# Patient Record
Sex: FEMALE | Race: WHITE | Employment: OTHER | URBAN - METROPOLITAN AREA
[De-identification: names, ages, dates, MRNs, and addresses within clinical notes are randomized per-mention and may not be internally consistent; named-entity substitution may affect disease eponyms.]

---

## 2018-02-06 NOTE — PROGRESS NOTES
Chart review completed via Care Everywhere for Care Coordination and Gap closures for year 2017. No additional records found.

## 2018-04-08 ENCOUNTER — HOSPITAL ENCOUNTER (EMERGENCY)
Age: 73
Discharge: HOME | End: 2018-04-08
Attending: EMERGENCY MEDICINE
Payer: MEDICARE

## 2018-04-08 ENCOUNTER — APPOINTMENT (OUTPATIENT)
Dept: GENERAL RADIOLOGY | Age: 73
End: 2018-04-08
Payer: MEDICARE

## 2018-04-08 VITALS
BODY MASS INDEX: 27.64 KG/M2 | WEIGHT: 156 LBS | TEMPERATURE: 97.9 F | DIASTOLIC BLOOD PRESSURE: 68 MMHG | SYSTOLIC BLOOD PRESSURE: 134 MMHG | HEART RATE: 69 BPM | HEIGHT: 63 IN | OXYGEN SATURATION: 97 % | RESPIRATION RATE: 16 BRPM

## 2018-04-08 DIAGNOSIS — M51.37 DDD (DEGENERATIVE DISC DISEASE), LUMBOSACRAL: Primary | ICD-10-CM

## 2018-04-08 PROCEDURE — 72100 X-RAY EXAM L-S SPINE 2/3 VWS: CPT

## 2018-04-08 PROCEDURE — 73502 X-RAY EXAM HIP UNI 2-3 VIEWS: CPT

## 2018-04-08 PROCEDURE — 81003 URINALYSIS AUTO W/O SCOPE: CPT | Performed by: PHYSICIAN ASSISTANT

## 2018-04-08 PROCEDURE — 99284 EMERGENCY DEPT VISIT MOD MDM: CPT | Performed by: PHYSICIAN ASSISTANT

## 2018-04-08 RX ORDER — TRAMADOL HYDROCHLORIDE 50 MG/1
50 TABLET ORAL
Qty: 20 TAB | Refills: 0 | Status: SHIPPED | OUTPATIENT
Start: 2018-04-08 | End: 2018-07-10

## 2018-04-08 RX ORDER — METHYLPREDNISOLONE 4 MG/1
4 TABLET ORAL
Qty: 1 DOSE PACK | Refills: 0 | Status: SHIPPED | OUTPATIENT
Start: 2018-04-08 | End: 2018-07-10

## 2018-04-08 NOTE — ED NOTES
I have reviewed discharge instructions with the patient. The patient verbalized understanding. Patient left ED via Discharge Method: ambulatory to Home with daughter. Opportunity for questions and clarification provided. Patient given 1 scripts. Tramadol. Rx for medrol edgar e-scribed to patient's pharmacy. To continue your aftercare when you leave the hospital, you may receive an automated call from our care team to check in on how you are doing. This is a free service and part of our promise to provide the best care and service to meet your aftercare needs.  If you have questions, or wish to unsubscribe from this service please call [phone]. Thank you for Choosing our [facility] Emergency Department.

## 2018-04-08 NOTE — ED PROVIDER NOTES
HPI Comments: Patient is here with low back and right hip pain that started over 2 weeks ago. She states that she does have a history of degenerative disc disease and was getting shots in her back about 5 years ago but it was doing better until about 2 weeks ago. She did not injure it but woke up with the pain. She has not had any dysuria, polyuria, otherwise trouble with urination or bowel movements and does not have any leg pain. The pain does go into the right hip but no weakness or swelling of the legs. No chest pain, shortness of breath, abdominal pain, dyspnea on exertion, orthopnea or other symptoms. She was ambulatory to the bathroom and the stretcher without difficulty and well-hydrated. Patient is a 67 y.o. female presenting with back pain and hip pain. The history is provided by the patient. Back Pain    This is a recurrent problem. The current episode started more than 1 week ago (2 weeks now). The problem has been gradually worsening. The problem occurs constantly. The pain is associated with no known injury. The pain is present in the lumbar spine (right hip pain). The quality of the pain is described as aching. The pain is at a severity of 9/10. The pain is moderate. The symptoms are aggravated by bending and twisting. Pertinent negatives include no chest pain, no fever, no numbness, no weight loss, no headaches, no abdominal pain, no abdominal swelling, no bowel incontinence, no perianal numbness, no bladder incontinence, no dysuria, no pelvic pain, no leg pain, no paresthesias, no paresis, no tingling and no weakness. She has tried nothing for the symptoms. Hip Injury    This is a recurrent problem. The current episode started more than 1 week ago (2 weeks now). The problem occurs constantly. The problem has been gradually worsening. The pain is present in the right hip. The quality of the pain is described as aching. The pain is at a severity of 9/10. The pain is moderate.  Associated symptoms include back pain. Pertinent negatives include no numbness and no tingling. The symptoms are aggravated by activity and standing. There has been no history of extremity trauma. Past Medical History:   Diagnosis Date    Anxiety and depression     Arthritis     Chest discomfort     Apr 2014 - Lexiscan MPI - normal EKG, perfusion images and LVEF    Chronic back pain     COPD (chronic obstructive pulmonary disease) (HCC)     DM2 (diabetes mellitus, type 2) ([address] 6/21/2012    Frequent UTI     GERD (gastroesophageal reflux disease)     H/O seasonal allergies     Heart murmur     Hiatal hernia     History of kidney stones     HLD (hyperlipidemia) 6/21/2012    HTN (hypertension) 6/21/2012    Hypothyroidism     Varicose veins 6/21/2012       Past Surgical History:   Procedure Laterality Date    ENDOSCOPY, COLON, DIAGNOSTIC  2011    two polyps    HX APPENDECTOMY  1976    HX BREAST BIOPSY      HX CATARACT REMOVAL  2002 and 2006    bilateral    HX CHOLECYSTECTOMY  1997    HX CYST REMOVAL  1973    wrist    HX GYN  1997    vaginal repair    HX HEART CATHETERIZATION  2007    left side    HX HERNIA REPAIR  1997    HX HYSTERECTOMY  1982    HX MOHS PROCEDURES      HX ORTHOPAEDIC  1985    ankle    HX TUBAL LIGATION  1976    bilateral    HX UROLOGICAL  1997    bladder tack         Family History:   Problem Relation Age of Onset    Diabetes Mother     Colon Polyps Mother     Coronary Artery Disease Father     Diabetes Sister     Coronary Artery Disease Sister     Coronary Artery Disease Brother     Diabetes Brother     Breast Cancer Paternal Aunt 67       Social History     Social History    Marital status:      Spouse name: N/A    Number of children: N/A    Years of education: N/A     Occupational History    Not on file.      Social History Main Topics    Smoking status: Passive Smoke Exposure - Never Smoker    Smokeless tobacco: Never Used    Alcohol use No    Drug use: No    Sexual activity: Not on file     Other Topics Concern    Not on file     Social History Narrative         ALLERGIES: Aspirin; Codeine; Keflex [cephalexin]; and Sulfa dyne    Review of Systems   Constitutional: Negative. Negative for fever and weight loss. HENT: Negative. Eyes: Negative. Respiratory: Negative. Cardiovascular: Negative. Negative for chest pain. Gastrointestinal: Negative. Negative for abdominal pain and bowel incontinence. Genitourinary: Negative. Negative for bladder incontinence, dysuria and pelvic pain. Musculoskeletal: Positive for back pain. Right hip pain     Skin: Negative. Neurological: Negative. Negative for tingling, weakness, numbness, headaches and paresthesias. Psychiatric/Behavioral: Negative. All other systems reviewed and are negative. Vitals:    04/08/18 1337   BP: 155/83   Pulse: 80   Resp: 18   Temp: 97.9 °F (36.6 °C)   SpO2: 98%   Weight: 70.8 kg (156 lb)   Height: 5' 3\" (1.6 m)            Physical Exam   Constitutional: She is oriented to person, place, and time. She appears well-developed and well-nourished. HENT:   Head: Normocephalic and atraumatic. Right Ear: External ear normal.   Left Ear: External ear normal.   Nose: Nose normal.   Mouth/Throat: Oropharynx is clear and moist.   Eyes: Conjunctivae and EOM are normal. Pupils are equal, round, and reactive to light. Neck: Normal range of motion. Neck supple. Cardiovascular: Normal rate, regular rhythm, normal heart sounds and intact distal pulses. Pulmonary/Chest: Effort normal and breath sounds normal.   Abdominal: Soft. Bowel sounds are normal.   Musculoskeletal: Normal range of motion. Back:    Neurological: She is alert and oriented to person, place, and time. She has normal strength and normal reflexes. No cranial nerve deficit or sensory deficit. She displays a negative Romberg sign. GCS eye subscore is 4. GCS verbal subscore is 5.  GCS motor subscore is 6. Reflex Scores:       Tricep reflexes are 2+ on the right side and 2+ on the left side. Bicep reflexes are 2+ on the right side and 2+ on the left side. Brachioradialis reflexes are 2+ on the right side and 2+ on the left side. Patellar reflexes are 2+ on the right side and 2+ on the left side. Achilles reflexes are 2+ on the right side and 2+ on the left side. Patient ambulatory to bathroom, gave sample and ambulatory back to stretcher. Skin: Skin is warm and dry. Psychiatric: She has a normal mood and affect. Her behavior is normal. Judgment and thought content normal.   Nursing note and vitals reviewed. MDM  Number of Diagnoses or Management Options     Amount and/or Complexity of Data Reviewed  Clinical lab tests: ordered and reviewed  Tests in the radiology section of CPT®: ordered and reviewed    Risk of Complications, Morbidity, and/or Mortality  Presenting problems: moderate  Diagnostic procedures: moderate  Management options: moderate    Patient Progress  Patient progress: stable        ED Course       Procedures    The patient was observed in the ED. Results Reviewed:  XR HIP RT W OR WO PELV 2-3 VWS   Final Result   Impression:  No evidence of acute injury. XR SPINE LUMB 2 OR 3 V   Final Result   Impression:  No evidence of acute injury. Mild diffuse lumbar degenerative disc   disease as above. Warm, moist heat to area, massage, gentle range of motion and stretching to area multiple times a day. Follow up with PCP for recheck. Use the medication as directed. Patient did not want any medication while here. No neurosurgical emergency on exam today. Patient is stable for discharge. I discussed the results of all labs, procedures, radiographs, and treatments with the patient and available family. Treatment plan is agreed upon and the patient is ready for discharge.   All voiced understanding of the discharge plan and medication instructions or changes as appropriate. Questions about treatment in the ED were answered. All were encouraged to return should symptoms worsen or new problems develop.

## 2018-04-08 NOTE — DISCHARGE INSTRUCTIONS
Learning About Degenerative Disc Disease  What is degenerative disc disease? Degenerative disc disease is not really a disease. It's a term used to describe the normal changes in your spinal discs as you age. Spinal discs are small, spongy discs that separate the bones (vertebrae) that make up the spine. The discs act as shock absorbers for the spine, so it can flex, bend, and twist.  Degenerative disc disease can take place in one or more places along the spine. It most often occurs in the discs in the lower back and the neck. What causes it? As we age, our spinal discs break down, or degenerate. This breakdown causes the symptoms of degenerative disc disease in some people. When the discs break down, they can lose fluid and dry out, and their outer layers can have tiny cracks or tears. This leads to less padding and less space between the bones in the spine. The body reacts to this by making bony growths on the spine called bone spurs. These spurs can press on the spinal nerve roots or spinal cord. This can cause pain and can affect how well the nerves work. What are the symptoms? Many people with degenerative disc disease have no pain. But others have severe pain or other symptoms that limit their activities. Some of the most common symptoms are:  · Pain in the back or neck. Where the pain occurs depends on which discs are affected. · Pain that gets worse when you move, such as bending over, reaching up, or twisting. · Pain that may occur in the rear end (buttocks), arm, or leg if a nerve is pinched. · Numbness or tingling in your arm or leg. How is it diagnosed? A doctor can often diagnose degenerative disc disease while doing a physical exam. If your exam shows no signs of a serious condition, imaging tests (such as an X-ray) aren't likely to help your doctor find the cause of your symptoms.   Sometimes degenerative disc disease is found when an X-ray is taken for another reason, such as an injury or other health problem. But even if the doctor finds degenerative disc disease, that doesn't always mean that you will have symptoms. How is it treated? There are several things you can do at home to manage pain from this problem. · To relieve pain, use ice or heat (whichever feels better) on the affected area. ¨ Put ice or a cold pack on the area for 10 to 20 minutes at a time. Put a thin cloth between the ice and your skin. ¨ Put a warm water bottle, a heating pad set on low, or a warm cloth on your back. Put a thin cloth between the heating pad and your skin. Do not go to sleep with a heating pad on your skin. · Ask your doctor if you can take acetaminophen (such as Tylenol) or nonsteroidal anti-inflammatory drugs, such as ibuprofen or naproxen. Your doctor can prescribe stronger medicines if needed. Be safe with medicines. Read and follow all instructions on the label. · Get some exercise every day. Exercise is one of the best ways to help your back feel better and stay better. It's best to start each exercise slowly. You may notice a little soreness, and that's okay. But if an exercise makes your pain worse, stop doing it. Here are things you can try:  ¨ Walking. It's the simplest and maybe the best activity for your back. It gets your blood moving and helps your muscles stay strong. ¨ Exercises that gently stretch and strengthen your stomach, back, and leg muscles. The stronger those muscles are, the better they're able to protect your back. If you have constant or severe pain in your back or spine, you may need other treatments, such as physical therapy. In some cases, your doctor may suggest surgery. Follow-up care is a key part of your treatment and safety. Be sure to make and go to all appointments, and call your doctor if you are having problems. It's also a good idea to know your test results and keep a list of the medicines you take. Where can you learn more?   Go to http://hattie-dino.info/. Enter O243 in the search box to learn more about \"Learning About Degenerative Disc Disease. \"  Current as of: March 21, 2017  Content Version: 11.4  © 0917-0555 [employer] Care instructions adapted under license by [employer] (which disclaims liability or warranty for this information). If you have questions about a medical condition or this instruction, always ask your healthcare professional. [place] [address] any warranty or liability for your use of this information. Low Back Arthritis: Exercises  Your Care Instructions  Here are some examples of typical rehabilitation exercises for your condition. Start each exercise slowly. Ease off the exercise if you start to have pain. Your doctor or physical therapist will tell you when you can start these exercises and which ones will work best for you. When you are not being active, find a comfortable position for rest. Some people are comfortable on the floor or a medium-firm bed with a small pillow under their head and another under their knees. Some people prefer to lie on their side with a pillow between their knees. Don't stay in one position for too long. Take short walks (10 to 20 minutes) every 2 to 3 hours. Avoid slopes, hills, and stairs until you feel better. Walk only distances you can manage without pain, especially leg pain. How to do the exercises  Pelvic tilt    1. Lie on your back with your knees bent. 2. \"Brace\" your stomach-tighten your muscles by pulling in and imagining your belly button moving toward your spine. 3. Press your lower back into the floor. You should feel your hips and pelvis rock back. 4. Hold for 6 seconds while breathing smoothly. 5. Relax and allow your pelvis and hips to rock forward. 6. Repeat 8 to 12 times. Back stretches    1. Get down on your hands and knees on the floor. 2. Relax your head and allow it to droop.  Round your back up toward the ceiling until you feel a nice stretch in your upper, middle, and lower back. Hold this stretch for as long as it feels comfortable, or about 15 to 30 seconds. 3. Return to the starting position with a flat back while you are on your hands and knees. 4. Let your back sway by pressing your stomach toward the floor. Lift your buttocks toward the ceiling. 5. Hold this position for 15 to 30 seconds. 6. Repeat 2 to 4 times. Follow-up care is a key part of your treatment and safety. Be sure to make and go to all appointments, and call your doctor if you are having problems. It's also a good idea to know your test results and keep a list of the medicines you take. Where can you learn more? Go to http://hattie-dino.info/. Enter R835 in the search box to learn more about \"Low Back Arthritis: Exercises. \"  Current as of: March 21, 2017  Content Version: 11.4  © 6515-1524 [employer]. Care instructions adapted under license by [employer] (which disclaims liability or warranty for this information). If you have questions about a medical condition or this instruction, always ask your healthcare professional. [address] any warranty or liability for your use of this information.

## 2018-04-26 ENCOUNTER — HOSPITAL ENCOUNTER (OUTPATIENT)
Dept: CT IMAGING | Age: 73
Discharge: HOME | End: 2018-04-26
Attending: INTERNAL MEDICINE
Payer: MEDICARE

## 2018-04-26 DIAGNOSIS — R51.9 NONINTRACTABLE HEADACHE, UNSPECIFIED CHRONICITY PATTERN, UNSPECIFIED HEADACHE TYPE: ICD-10-CM

## 2018-04-26 DIAGNOSIS — R22.0 SCALP LUMP: ICD-10-CM

## 2018-04-26 PROCEDURE — 70450 CT HEAD/BRAIN W/O DYE: CPT

## 2018-12-25 ENCOUNTER — APPOINTMENT (OUTPATIENT)
Dept: GENERAL RADIOLOGY | Age: 73
End: 2018-12-25
Attending: STUDENT IN AN ORGANIZED HEALTH CARE EDUCATION/TRAINING PROGRAM
Payer: MEDICARE

## 2018-12-25 ENCOUNTER — HOSPITAL ENCOUNTER (EMERGENCY)
Age: 73
Discharge: HOME | End: 2018-12-25
Attending: STUDENT IN AN ORGANIZED HEALTH CARE EDUCATION/TRAINING PROGRAM
Payer: MEDICARE

## 2018-12-25 VITALS
TEMPERATURE: 98.9 F | HEART RATE: 92 BPM | WEIGHT: 156 LBS | BODY MASS INDEX: 28.71 KG/M2 | OXYGEN SATURATION: 92 % | RESPIRATION RATE: 18 BRPM | HEIGHT: 62 IN | DIASTOLIC BLOOD PRESSURE: 68 MMHG | SYSTOLIC BLOOD PRESSURE: 148 MMHG

## 2018-12-25 DIAGNOSIS — S40.011A CONTUSION OF RIGHT SHOULDER, INITIAL ENCOUNTER: Primary | ICD-10-CM

## 2018-12-25 PROCEDURE — 74011250636 HC RX REV CODE- 250/636: Performed by: STUDENT IN AN ORGANIZED HEALTH CARE EDUCATION/TRAINING PROGRAM

## 2018-12-25 PROCEDURE — 73030 X-RAY EXAM OF SHOULDER: CPT

## 2018-12-25 PROCEDURE — 96372 THER/PROPH/DIAG INJ SC/IM: CPT | Performed by: STUDENT IN AN ORGANIZED HEALTH CARE EDUCATION/TRAINING PROGRAM

## 2018-12-25 PROCEDURE — 99283 EMERGENCY DEPT VISIT LOW MDM: CPT | Performed by: STUDENT IN AN ORGANIZED HEALTH CARE EDUCATION/TRAINING PROGRAM

## 2018-12-25 RX ORDER — DICLOFENAC SODIUM 10 MG/G
2 GEL TOPICAL 4 TIMES DAILY
Qty: 100 G | Refills: 0 | Status: SHIPPED | OUTPATIENT
Start: 2018-12-25 | End: 2019-01-01

## 2018-12-25 RX ORDER — CYCLOBENZAPRINE HCL 5 MG
10 TABLET ORAL
Qty: 20 TAB | Refills: 0 | Status: SHIPPED | OUTPATIENT
Start: 2018-12-25 | End: 2019-10-22 | Stop reason: SDUPTHER

## 2018-12-25 RX ORDER — NAPROXEN 500 MG/1
500 TABLET ORAL 2 TIMES DAILY WITH MEALS
Qty: 20 TAB | Refills: 0 | Status: SHIPPED | OUTPATIENT
Start: 2018-12-25 | End: 2019-01-04

## 2018-12-25 RX ORDER — KETOROLAC TROMETHAMINE 30 MG/ML
30 INJECTION, SOLUTION INTRAMUSCULAR; INTRAVENOUS
Status: COMPLETED | OUTPATIENT
Start: 2018-12-25 | End: 2018-12-25

## 2018-12-25 RX ADMIN — KETOROLAC TROMETHAMINE 30 MG: 30 INJECTION, SOLUTION INTRAMUSCULAR at 04:10

## 2018-12-25 NOTE — ED PROVIDER NOTES
59-year-old female patient presents with reports of right-sided shoulder pain after backing into a cabinet earlier this evening. Patient states she backed into the sharp corner of the cabinet impacting her shoulder/scapula on the right side. She reports continued pain following the incident. This pain is diffuse through the shoulder, over the shoulder blade and radiates down the right arm. She reports no improvement with tramadol at home and topical NSAID ointment she applied. There is no associated chest pain or shortness of breath. This pain is worsened with movement and palpation of the area. She denies any other injury. The history is provided by the patient and a relative. Shoulder Pain    The incident occurred 6 to 12 hours ago. The incident occurred at home. The injury mechanism was a direct blow. The right shoulder is affected. Pertinent negatives include no numbness.         Past Medical History:   Diagnosis Date    Anxiety and depression     Arthritis     Chest discomfort     Apr 2014 - Viola Cassidy MPI - normal EKG, perfusion images and LVEF    Chronic back pain     COPD [facility] disease) ([facility]     [facility] 6/21/2012    Frequent UTI     GERD (gastroesophageal reflux disease)     H/O seasonal allergies     Heart murmur     Hiatal hernia     History of kidney stones     HLD (hyperlipidemia) 6/21/2012    HTN (hypertension) 6/21/2012    Hypothyroidism     Varicose veins 6/21/2012       Past Surgical History:   Procedure Laterality Date    ENDOSCOPY, COLON, DIAGNOSTIC  2011    two polyps    HX APPENDECTOMY  1976    HX BREAST BIOPSY      HX CATARACT REMOVAL  2002 and 2006    bilateral    HX CHOLECYSTECTOMY  1997    HX CYST REMOVAL  1973    wrist    HX GYN  1997    vaginal repair    HX HEART CATHETERIZATION  2007    left side    HX HERNIA REPAIR  1997    HX HYSTERECTOMY  1982    HX MOHS PROCEDURES      [facility] ankle    HX TUBAL LIGATION  1976    bilateral    HX UROLOGICAL  1997    bladder tack         Family History:   Problem Relation Age of Onset    Diabetes Mother     Colon Polyps Mother     Coronary Artery Disease Father     Diabetes Sister     Coronary Artery Disease Sister     Coronary Artery Disease Brother     Diabetes Brother     Breast Cancer Paternal Aunt 67       Social History     Socioeconomic History    Marital status:      Spouse name: Not on file    Number of children: Not on file    Years of education: Not on file    Highest education level: Not on file   Social Needs    Financial resource strain: Not on file    Food insecurity - worry: Not on file    Food insecurity - inability: Not on file   [employer] needs - medical: Not on file   [employer] needs - non-medical: Not on file   Occupational History    Not on file   Tobacco Use    Smoking status: Passive Smoke Exposure - Never Smoker    Smokeless tobacco: Never Used   Substance and Sexual Activity    Alcohol use: No     Alcohol/week: 0.0 oz    Drug use: No    Sexual activity: Not on file   Other Topics Concern    Not on file   Social History Narrative    Not on file         ALLERGIES: Aspirin; Codeine; Keflex [cephalexin]; and Sulfa dyne    Review of Systems   Constitutional: Negative for chills, diaphoresis and fever. HENT: Negative for congestion, sneezing and sore throat. Eyes: Negative for visual disturbance. Respiratory: Negative for cough, chest tightness, shortness of breath and wheezing. Cardiovascular: Negative for chest pain and leg swelling. Gastrointestinal: Negative for abdominal pain, blood in stool, diarrhea, nausea and vomiting. Endocrine: Negative for polyuria. Genitourinary: Negative for difficulty urinating, dysuria, flank pain, hematuria and urgency. Musculoskeletal: Negative for back pain, myalgias, neck pain and neck stiffness.    Skin: Negative for color change and rash.   Neurological: Negative for dizziness, syncope, speech difficulty, weakness, light-headedness, numbness and headaches. Psychiatric/Behavioral: Negative for behavioral problems. All other systems reviewed and are negative. Vitals:    12/25/18 0336   BP: 184/87   Pulse: 92   Resp: 18   Temp: 98.6 °F (37 °C)   SpO2: 94%   Weight: 70.8 kg (156 lb)   Height: 5' 2\" (1.575 m)            Physical Exam   Constitutional: She is oriented to person, place, and time. She appears well-developed and well-nourished. No distress. Alert and oriented to person place and time. No acute distress, speaks in clear, fluid sentences. HENT:   Head: Normocephalic and atraumatic. Right Ear: External ear normal.   Left Ear: External ear normal.   Nose: Nose normal.   Eyes: EOM are normal. Pupils are equal, round, and reactive to light. Neck: Normal range of motion. Cardiovascular: Normal rate, regular rhythm, normal heart sounds and intact distal pulses. Exam reveals no gallop and no friction rub. No murmur heard. Pulmonary/Chest: Effort normal and breath sounds normal. No respiratory distress. She has no wheezes. She has no rales. She exhibits no tenderness. Abdominal: Normal appearance. Musculoskeletal: Normal range of motion. She exhibits no edema, tenderness or deformity. Arms:  There is reproducible pain with palpation of the musculature over the right scapula and posterior right shoulder. There is no obvious deformity, dislocation or significant signs of trauma. There is a small amount of muscular fullness to this area as well. Pain is reproducible on exam  Pulses are intact in the distal extremity with equal  strength bilaterally. Sensation intact as well. There is increased pain with abduction and forward flexion. There is no palpable crepitus   Neurological: She is alert and oriented to person, place, and time. No cranial nerve deficit. Skin: Skin is warm and dry.  She is not diaphoretic. Nursing note and vitals reviewed. MDM  Number of Diagnoses or Management Options  Diagnosis management comments: Symptoms consistent with musculoskeletal injury. We will obtain x-ray imaging to rule out bony abnormality.        Amount and/or Complexity of Data Reviewed  Tests in the radiology section of CPT®: ordered and reviewed    Risk of Complications, Morbidity, and/or Mortality  Presenting problems: moderate  Diagnostic procedures: low  Management options: moderate    Patient Progress  Patient progress: stable         Procedures

## 2018-12-25 NOTE — ED TRIAGE NOTES
Pt states she hit the back of her shoulder against the cabinet last evening and the pain continues to increase. Hx of rotator cuff surgery.

## 2018-12-25 NOTE — ED NOTES
I have reviewed discharge instructions with the patient. The patient verbalized understanding. Patient left ED via Discharge Method: ambulatory to Home with granddaughter. Opportunity for questions and clarification provided. Patient given 3 scripts. To continue your aftercare when you leave the hospital, you may receive an automated call from our care team to check in on how you are doing. This is a free service and part of our promise to provide the best care and service to meet your aftercare needs.  If you have questions, or wish to unsubscribe from this service please call [phone]. Thank you for Choosing our [facility] Emergency Department.

## 2019-03-27 ENCOUNTER — PATIENT OUTREACH (OUTPATIENT)
Dept: CASE MANAGEMENT | Age: 74
End: 2019-03-27

## 2019-03-29 ENCOUNTER — PATIENT OUTREACH (OUTPATIENT)
Dept: CASE MANAGEMENT | Age: 74
End: 2019-03-29

## 2019-05-01 ENCOUNTER — HOSPITAL ENCOUNTER (EMERGENCY)
Age: 74
Discharge: HOME | End: 2019-05-02
Attending: EMERGENCY MEDICINE | Admitting: EMERGENCY MEDICINE
Payer: MEDICARE

## 2019-05-01 DIAGNOSIS — M54.41 ACUTE RIGHT-SIDED LOW BACK PAIN WITH RIGHT-SIDED SCIATICA: Primary | ICD-10-CM

## 2019-05-01 PROCEDURE — 99283 EMERGENCY DEPT VISIT LOW MDM: CPT | Performed by: EMERGENCY MEDICINE

## 2019-05-02 VITALS
SYSTOLIC BLOOD PRESSURE: 184 MMHG | RESPIRATION RATE: 18 BRPM | HEART RATE: 72 BPM | BODY MASS INDEX: 29.44 KG/M2 | OXYGEN SATURATION: 96 % | WEIGHT: 160 LBS | TEMPERATURE: 98.6 F | HEIGHT: 62 IN | DIASTOLIC BLOOD PRESSURE: 79 MMHG

## 2019-05-02 PROCEDURE — 74011250637 HC RX REV CODE- 250/637: Performed by: EMERGENCY MEDICINE

## 2019-05-02 RX ORDER — TRAMADOL HYDROCHLORIDE 50 MG/1
100 TABLET ORAL
Status: COMPLETED | OUTPATIENT
Start: 2019-05-02 | End: 2019-05-02

## 2019-05-02 RX ORDER — TRAMADOL HYDROCHLORIDE 50 MG/1
50 TABLET ORAL
Qty: 8 TAB | Refills: 0 | Status: SHIPPED | OUTPATIENT
Start: 2019-05-02 | End: 2019-05-04

## 2019-05-02 RX ORDER — METHYLPREDNISOLONE 4 MG/1
TABLET ORAL
Qty: 1 DOSE PACK | Refills: 0 | Status: SHIPPED | OUTPATIENT
Start: 2019-05-02 | End: 2019-05-20

## 2019-05-02 RX ORDER — METAXALONE 800 MG/1
800 TABLET ORAL 3 TIMES DAILY
Qty: 15 TAB | Refills: 0 | Status: SHIPPED | OUTPATIENT
Start: 2019-05-02 | End: 2019-05-07

## 2019-05-02 RX ADMIN — TRAMADOL HYDROCHLORIDE 100 MG: 50 TABLET, FILM COATED ORAL at 00:08

## 2019-05-02 NOTE — ED NOTES
I have reviewed discharge instructions with the patient. The patient verbalized understanding. Patient left ED via Discharge Method: ambulatory to Home with family. Opportunity for questions and clarification provided. Patient given 3 scripts. To continue your aftercare when you leave the hospital, you may receive an automated call from our care team to check in on how you are doing. This is a free service and part of our promise to provide the best care and service to meet your aftercare needs.  If you have questions, or wish to unsubscribe from this service please call [phone]. Thank you for Choosing our [facility] Emergency Department.

## 2019-05-02 NOTE — ED PROVIDER NOTES
62-year-old female has had problems with her back and sciatica on and off over a couple years. Says she seen a surgeon before recommended some back surgery. Skull she's had degenerative disc and arthritis. No injuries recently. No fever or rash or chest difficulty urinating. She has a history of hypertension and diabetes. Her back is been more painful the last few days as worse with movement. It seemed to aggravate tonight she had trouble getting up. No numbness or weakness. Pain low back and down the posterior right thigh to above the knee. This is similar to past episodes. The history is provided by the patient. Back Pain This is a new problem. The current episode started more than 2 days ago. The problem has been gradually worsening. The problem occurs daily. Patient reports not work related injury. The pain is associated with no known injury. The pain is present in the lower back and right side. The quality of the pain is described as aching and burning. The pain radiates to the right thigh. The pain is moderate. The symptoms are aggravated by bending and twisting. Pertinent negatives include no chest pain, no fever, no numbness, no abdominal pain, no bladder incontinence, no dysuria, no paresthesias and no weakness. The treatment provided mild relief. Past Medical History:  
Diagnosis Date  Anxiety and depression  Arthritis  Chest discomfort Apr 2014 - Lexiscan MPI - normal EKG, perfusion images and LVEF  Chronic back pain  COPD (chronic obstructive pulmonary disease) ([facility])  DM2 (diabetes mellitus, type 2) [facility] 6/21/2012  Frequent UTI  GERD (gastroesophageal reflux disease)  H/O seasonal allergies  Heart murmur  Hiatal hernia  History of kidney stones  HLD (hyperlipidemia) 6/21/2012  
 HTN (hypertension) 6/21/2012  Hypothyroidism  Varicose veins 6/21/2012 Past Surgical History:  
Procedure Laterality Date  ENDOSCOPY, COLON, DIAGNOSTIC  2011  
 two polyps [address]  HX BREAST BIOPSY  HX CATARACT REMOVAL  2002 and 2006  
 bilateral  
[address]  HX CYST REMOVAL  1973  
 wrist  
 HX GYN  1997  
 vaginal repair  HX HEART CATHETERIZATION  2007  
 left side [address]  HX MOHS PROCEDURES    
 HX ORTHOPAEDIC  1985  
 ankle  HX TUBAL LIGATION  1976  
 bilateral  
 HX UROLOGICAL  1997  
 bladder tack Family History:  
Problem Relation Age of Onset  Diabetes Mother  Colon Polyps Mother  Coronary Artery Disease Father  Diabetes Sister  Coronary Artery Disease Sister  Coronary Artery Disease Brother  Diabetes Brother  Breast Cancer Paternal Aunt 67 Social History Socioeconomic History  Marital status:  Spouse name: Not on file  Number of children: Not on file  Years of education: Not on file  Highest education level: Not on file Occupational History  Not on file Social Needs  Financial resource strain: Not on file  Food insecurity:  
  Worry: Not on file Inability: Not on file  Transportation needs:  
  Medical: Not on file Non-medical: Not on file Tobacco Use  Smoking status: Passive Smoke Exposure - Never Smoker  Smokeless tobacco: Never Used Substance and Sexual Activity  Alcohol use: No  
  Alcohol/week: 0.0 oz  Drug use: No  
 Sexual activity: Not on file Lifestyle  Physical activity:  
  Days per week: Not on file Minutes per session: Not on file  Stress: Not on file Relationships  Social connections:  
  Talks on phone: Not on file Gets together: Not on file Attends Judaism service: Not on file Active member of club or organization: Not on file Attends meetings of clubs or organizations: Not on file Relationship status: Not on file  Intimate partner violence: Fear of current or ex partner: Not on file Emotionally abused: Not on file Physically abused: Not on file Forced sexual activity: Not on file Other Topics Concern  Not on file Social History Narrative  Not on file ALLERGIES: Aspirin; Codeine; Keflex [cephalexin]; and Sulfa dyne Review of Systems Constitutional: Negative for chills and fever. Cardiovascular: Negative for chest pain. Gastrointestinal: Negative for abdominal pain. Genitourinary: Negative for bladder incontinence, difficulty urinating and dysuria. Musculoskeletal: Positive for back pain. Neurological: Negative for weakness, numbness and paresthesias. Vitals:  
 05/01/19 2247 BP: 176/88 Resp: 16 Temp: 98.6 °F (37 °C) SpO2: 94% Weight: 72.6 kg (160 lb) Height: 5' 2\" (1.575 m) Physical Exam  
Constitutional: She appears well-developed and well-nourished. No distress. Musculoskeletal:  
     Right hip: Normal.  
     Lumbar back: She exhibits decreased range of motion and tenderness. She exhibits no bony tenderness. Neurological:  
Reflex Scores: 
     Patellar reflexes are 1+ on the right side and 1+ on the left side. Patient laboratory good strength in both legs. Some mild right posterior thigh pain with straight leg raising on that side. Skin: Skin is warm and dry. Nursing note and vitals reviewed. MDM Number of Diagnoses or Management Options Diagnosis management comments: No red flags to suggest imaging needed at this point. Amount and/or Complexity of Data Reviewed Review and summarize past medical records: yes (Review primary care doctor or note regarding the patient's back pain and her last visit within the last few weeks. Also emergent department visit for similar pain that was treated with a short course of tramadol and steroids.) Risk of Complications, Morbidity, and/or Mortality Presenting problems: moderate Diagnostic procedures: minimal 
 Management options: low Patient Progress Patient progress: stable Procedures

## 2019-05-02 NOTE — DISCHARGE INSTRUCTIONS
Rest.  Heating pad or Warm bottle. 2 over-the-counter ibuprofen every 6 hours for 2-3 days. Call your doctor to recheck if not feeling better 3-4 days. Recheck sooner if fever or rash or inability to urinate. Patient Education        Back Pain: Care Instructions  Your Care Instructions    Back pain has many possible causes. It is often related to problems with muscles and ligaments of the back. It may also be related to problems with the nerves, discs, or bones of the back. Moving, lifting, standing, sitting, or sleeping in an awkward way can strain the back. Sometimes you don't notice the injury until later. Arthritis is another common cause of back pain. Although it may hurt a lot, back pain usually improves on its own within several weeks. Most people recover in 12 weeks or less. Using good home treatment and being careful not to stress your back can help you feel better sooner. Follow-up care is a key part of your treatment and safety. Be sure to make and go to all appointments, and call your doctor if you are having problems. It's also a good idea to know your test results and keep a list of the medicines you take. How can you care for yourself at home? · Sit or lie in positions that are most comfortable and reduce your pain. Try one of these positions when you lie down:  ? Lie on your back with your knees bent and supported by large pillows. ? Lie on the floor with your legs on the seat of a sofa or chair. ? Lie on your side with your knees and hips bent and a pillow between your legs. ? Lie on your stomach if it does not make pain worse. · Do not sit up in bed, and avoid soft couches and twisted positions. Bed rest can help relieve pain at first, but it delays healing. Avoid bed rest after the first day of back pain. · Change positions every 30 minutes. If you must sit for long periods of time, take breaks from sitting. Get up and walk around, or lie in a comfortable position.   · Try using a heating pad on a low or medium setting for 15 to 20 minutes every 2 or 3 hours. Try a warm shower in place of one session with the heating pad. · You can also try an ice pack for 10 to 15 minutes every 2 to 3 hours. Put a thin cloth between the ice pack and your skin. · Take pain medicines exactly as directed. ? If the doctor gave you a prescription medicine for pain, take it as prescribed. ? If you are not taking a prescription pain medicine, ask your doctor if you can take an over-the-counter medicine. · Take short walks several times a day. You can start with 5 to 10 minutes, 3 or 4 times a day, and work up to longer walks. Walk on level surfaces and avoid hills and stairs until your back is better. · Return to work and other activities as soon as you can. Continued rest without activity is usually not good for your back. · To prevent future back pain, do exercises to stretch and strengthen your back and stomach. Learn how to use good posture, safe lifting techniques, and proper body mechanics. When should you call for help? Call your doctor now or seek immediate medical care if:    · You have new or worsening numbness in your legs.     · You have new or worsening weakness in your legs. (This could make it hard to stand up.)     · You lose control of your bladder or bowels.    Watch closely for changes in your health, and be sure to contact your doctor if:    · You have a fever, lose weight, or don't feel well.     · You do not get better as expected. Where can you learn more? Go to http://hattie-dino.info/. Enter E639 in the search box to learn more about \"Back Pain: Care Instructions. \"  Current as of: September 20, 2018  Content Version: 11.9  © 8471-9623 [employer]. Care instructions adapted under license by [employer] (which disclaims liability or warranty for this information).  If you have questions about a medical condition or this instruction, always ask your healthcare professional. [place] [address] any warranty or liability for your use of this information. Patient Education        Learning About Relief for Back Pain  What is back tension and strain? Back strain happens when you overstretch, or pull, a muscle in your back. You may hurt your back in an accident or when you exercise or lift something. Most back pain will get better with rest and time. You can take care of yourself at home to help your back heal.  What can you do first to relieve back pain? When you first feel back pain, try these steps:  · Walk. Take a short walk (10 to 20 minutes) on a level surface (no slopes, hills, or stairs) every 2 to 3 hours. Walk only distances you can manage without pain, especially leg pain. · Relax. Find a comfortable position for rest. Some people are comfortable on the floor or a medium-firm bed with a small pillow under their head and another under their knees. Some people prefer to lie on their side with a pillow between their knees. Don't stay in one position for too long. · Try heat or ice. Try using a heating pad on a low or medium setting, or take a warm shower, for 15 to 20 minutes every 2 to 3 hours. Or you can buy single-use heat wraps that last up to 8 hours. You can also try an ice pack for 10 to 15 minutes every 2 to 3 hours. You can use an ice pack or a bag of frozen vegetables wrapped in a thin towel. There is not strong evidence that either heat or ice will help, but you can try them to see if they help. You may also want to try switching between heat and cold. · Take pain medicine exactly as directed. ¨ If the doctor gave you a prescription medicine for pain, take it as prescribed. ¨ If you are not taking a prescription pain medicine, ask your doctor if you can take an over-the-counter medicine. What else can you do? · Stretch and exercise.  Exercises that increase flexibility may relieve your pain and make it easier for your muscles to keep your spine in a good, neutral position. And don't forget to keep walking. · Do self-massage. You can use self-massage to unwind after work or school or to energize yourself in the morning. You can easily massage your feet, hands, or neck. Self-massage works best if you are in comfortable clothes and are sitting or lying in a comfortable position. Use oil or lotion to massage bare skin. · Reduce stress. Back pain can lead to a vicious [place]: Distress about the pain tenses the muscles in your back, which in turn causes more pain. Learn how to relax your mind and your muscles to lower your stress. Where can you learn more? Go to http://hattie-dino.info/. Enter M787 in the search box to learn more about \"Learning About Relief for Back Pain. \"  Current as of: March 21, 2017  Content Version: 11.5  © 0107-9870 [employer], [employer]. Care instructions adapted under license by [employer] (which disclaims liability or warranty for this information). If you have questions about a medical condition or this instruction, always ask your healthcare professional. [place] [address] any warranty or liability for your use of this information.

## 2019-05-22 ENCOUNTER — HOSPITAL ENCOUNTER (OUTPATIENT)
Dept: MAMMOGRAPHY | Age: 74
Discharge: HOME | End: 2019-05-22
Attending: INTERNAL MEDICINE
Payer: MEDICARE

## 2019-05-22 DIAGNOSIS — Z12.39 SCREENING FOR BREAST CANCER: ICD-10-CM

## 2019-05-22 PROCEDURE — 77067 SCR MAMMO BI INCL CAD: CPT

## 2019-05-23 ENCOUNTER — PATIENT OUTREACH (OUTPATIENT)
Dept: CASE MANAGEMENT | Age: 74
End: 2019-05-23

## 2019-05-28 ENCOUNTER — PATIENT OUTREACH (OUTPATIENT)
Dept: CASE MANAGEMENT | Age: 74
End: 2019-05-28

## 2019-05-30 NOTE — PROGRESS NOTES
Referral Source & Reason 5/30/19, 1347   Primary concerns per patient and/or pts family/caregiver Med compliance   Recent Hospitalization(s)/ED Visits None   Current with Home Health?  - Agency? No   Social Needs:  - Able to afford medications? - Financial assessment?  - Access to care? Transportation? Reports Insulin is expensive, applying for Medicaid    Drives self or daughter transports   Nutritional Assessment  - Appetite? - Obesity?  - Failure to Thrive? - Bowels ? Admits drinking sugar sweetened beverages   Cognitive Assessment  - History of dementia  - Health literacy    Will assess   Mobility/Activity Assessment  - Bed/chair bound? - Make use of assistive devices? - Does patient still drive? No assistive devices    Can drive   Plan/Interventions/Education  - Follow up Appointments  - Referrals 7/22 PCP Dr Daniella Whitman made multiple attempts to contact pt. Able to speak w/ pt, she is agreeable to CCM f/u. All meds reviewed, states compliance. Discussed importance of blood sugar control, no sugar sweetened beverages, no candy, limit carbs, eat more fruits/ vegetables. Will review again next contact. Discussed f/u appmts and pt has not been seen at Cardiology since 2016. Agreeable to this RN making appmt for her. [facility] and appmt made for 8/16 w/ Dr Queenie Allan. Called pt back to update. Plan to f/u next week, agreeable. This note will not be viewable in [address]

## 2019-06-07 ENCOUNTER — PATIENT OUTREACH (OUTPATIENT)
Dept: CASE MANAGEMENT | Age: 74
End: 2019-06-07

## 2019-06-07 NOTE — PROGRESS NOTES
Community Care Management  Follow up Outreach Note   Outreach type: Phone call: Yes     Date/Time of Outreach: 6/7/19, 1111     Reason for follow-up:   Continued outreach   Disease specific complaints/issues:   Back pain - at [facility] doctor\" when RN called     Patient progress towards goals set from last contact:   Stable   Has patient attended any PCP or specialist follow-up appointments since last contact? What was outcome of appointment? When is next follow-up scheduled? 6/19 DM education  7/22 PCP Dr Luz Senior     Review medications. Any medication changes since last outreach? Does patient have any questions or issues related to their medications? Reports compliance   Home health active? If yes  any issue? Progress? No   Referrals needed?  (SW, Diabetes education, HH, etc. ) No   Other issues/Miscellaneous? (Transportation, access to meals, ability to perform ADLs, adequate caregiver support, etc.)     Daughter denies any issues/ questions         Next Outreach Scheduled: Next week     Next Steps/Goals:   F/U   Community Care Manager: Mic Conley RN     This note will not be viewable in [address]

## 2019-06-18 ENCOUNTER — PATIENT OUTREACH (OUTPATIENT)
Dept: CASE MANAGEMENT | Age: 74
End: 2019-06-18

## 2019-06-18 NOTE — PROGRESS NOTES
Community Care Management  Follow up Outreach Note   Outreach type: Phone call: Yes   Date/Time of Outreach: 6/18/19, 3646     Reason for follow-up:   Continued outreach   Disease specific complaints/issues: None       Patient progress towards goals set from last contact:   Stable   Has patient attended any PCP or specialist follow-up appointments since last contact? What was outcome of appointment? When is next follow-up scheduled? 6/7 MRI   6/19 DM education    7/22 PCP Dr Estuardo Nix   Review medications. Any medication changes since last outreach? Does patient have any questions or issues related to their medications? Pt states compliance   Home health active? If yes  any issue? Progress? No   Referrals needed?  (SW, Diabetes education, HH, etc. ) No   Other issues/Miscellaneous? (Transportation, access to meals, ability to perform ADLs, adequate caregiver support, etc.)     Reports blood sugar reads \"hi\", doesn't think monitor working. Encouraged to take to DM education tomorrow to have assessed. Verbalizes understanding. Discussed eliminating all sugar from diet, using sugar substitutes. Next Outreach Scheduled: Next week     Next Steps/Goals:   F/U   Community Care Manager: Jigar Santos RN         This note will not be viewable in [address]

## 2019-06-19 ENCOUNTER — HOSPITAL ENCOUNTER (OUTPATIENT)
Dept: DIABETES SERVICES | Age: 74
Discharge: HOME | End: 2019-06-19
Payer: MEDICARE

## 2019-06-19 PROCEDURE — G0108 DIAB MANAGE TRN  PER INDIV: HCPCS

## 2019-06-19 NOTE — PROGRESS NOTES
Came for diabetes educational assessment today. Provided basic information on carbohydrates, proteins and fats. Educational need/plan: Will attend 2 nutrition/2 diabetes sessions to address the following: diabetes disease process, nutritional management, physical activity, using medications, preventing complications, psychosocial adjustment, goal setting, problem solving, monitoring, behavior change strategies. Glucometer is currently broken, so she has not been testing her blood sugars. Offered cost effective options. She plans on getting another meter.

## 2019-06-26 ENCOUNTER — PATIENT OUTREACH (OUTPATIENT)
Dept: CASE MANAGEMENT | Age: 74
End: 2019-06-26

## 2019-06-27 NOTE — PROGRESS NOTES
RNCM calls to patient, no VM, unable to leave a message. No return calls. Plan to f/u next week. This note will not be viewable in [address]

## 2019-07-01 ENCOUNTER — HOSPITAL ENCOUNTER (OUTPATIENT)
Dept: DIABETES SERVICES | Age: 74
Discharge: HOME | End: 2019-07-01
Payer: MEDICARE

## 2019-07-01 PROCEDURE — G0109 DIAB MANAGE TRN IND/GROUP: HCPCS

## 2019-07-01 NOTE — PROGRESS NOTES
Participant attended Diabetes #1 session today. Topics included: Characteristics/pathophysiology type 1/type 2 diabetes; Goal/acceptable blood glucose ranges/Hgb A1C/interpreting/using results;meters, continuous glucose monitors and insulin pumps. Using medications safely; Sick day management; Prevention/detection/treatment of acute complications. - Verbalized understanding  of material covered.   -Goal for next session Diabetes Two  -Anticipated adherence is  Good   -Problems/barriers may be none anticipated

## 2019-07-02 ENCOUNTER — PATIENT OUTREACH (OUTPATIENT)
Dept: CASE MANAGEMENT | Age: 74
End: 2019-07-02

## 2019-07-03 NOTE — PROGRESS NOTES
Noted pt is attending DM classes. RNCM unable to reach patient or daughter despite multiple attempts. Plan to discharge pt from f/u, no further outreach planned. Happy to assist in future. This note will not be viewable in [address]

## 2019-07-15 ENCOUNTER — HOSPITAL ENCOUNTER (OUTPATIENT)
Dept: DIABETES SERVICES | Age: 74
Discharge: HOME | End: 2019-07-15
Payer: MEDICARE

## 2019-07-15 PROCEDURE — G0109 DIAB MANAGE TRN IND/GROUP: HCPCS

## 2019-07-23 ENCOUNTER — HOSPITAL ENCOUNTER (OUTPATIENT)
Dept: DIABETES SERVICES | Age: 74
Discharge: HOME | End: 2019-07-23
Payer: MEDICARE

## 2019-07-23 PROCEDURE — G0109 DIAB MANAGE TRN IND/GROUP: HCPCS

## 2019-07-30 ENCOUNTER — HOSPITAL ENCOUNTER (OUTPATIENT)
Dept: DIABETES SERVICES | Age: 74
Discharge: HOME | End: 2019-07-30
Payer: MEDICARE

## 2019-07-30 PROCEDURE — G0109 DIAB MANAGE TRN IND/GROUP: HCPCS

## 2019-07-30 NOTE — PROGRESS NOTES
Attended nutrition diabetes #2 group session today. Topics included: plate method for portion control; fiber and sodium guidelines; sugar substitutes; alcohol; eating out; recipe modification; label reading. Participant's nutrition goal: To get off insulin she will eat a 7 gram protein with meals and snacks and re-evaluate in 3 months. Participant's nutrition support plan: read food labels  Barriers identified by participant: lunch on the job is only 30 minutes. Voiced/demonstrated understanding of material covered. Anticipated adherence is good. Plan for follow up is: will be sent a follow up questionnaire at 6 months and one year.

## 2019-08-29 ENCOUNTER — HOSPITAL ENCOUNTER (OUTPATIENT)
Dept: DIABETES SERVICES | Age: 74
Discharge: HOME | End: 2019-08-29
Payer: MEDICARE

## 2019-08-29 PROCEDURE — G0109 DIAB MANAGE TRN IND/GROUP: HCPCS

## 2019-08-29 NOTE — PROGRESS NOTES
Attended diabetes follow up group class.  Open forum for clients to ask questions based on entire diabetes self management education program. Education topics are driven in this class based on clients' individual questions and needs. Topics included: meal planning, weight loss, eating out, fats, cholesterol, exercise, Hgb A1C, stress/depression, annual eye exam and prevention of flu and pneumonia.

## 2019-09-09 PROBLEM — R07.89 CHEST DISCOMFORT: Status: ACTIVE | Noted: 2019-09-09

## 2019-09-11 ENCOUNTER — HOSPITAL ENCOUNTER (OUTPATIENT)
Dept: LAB | Age: 74
Discharge: HOME | End: 2019-09-11
Payer: MEDICARE

## 2019-09-11 DIAGNOSIS — R94.39 ABNORMAL NUCLEAR STRESS TEST: ICD-10-CM

## 2019-09-11 LAB
ANION GAP SERPL CALC-SCNC: 4 MMOL/L (ref 7–16)
BASOPHILS # BLD: 0 K/UL (ref 0–0.2)
BASOPHILS NFR BLD: 1 % (ref 0–2)
BUN SERPL-MCNC: 10 MG/DL (ref 8–23)
CALCIUM SERPL-MCNC: 9.4 MG/DL (ref 8.3–10.4)
CHLORIDE SERPL-SCNC: 104 MMOL/L (ref 98–107)
CO2 SERPL-SCNC: 31 MMOL/L (ref 21–32)
CREAT SERPL-MCNC: 0.7 MG/DL (ref 0.6–1)
DIFFERENTIAL METHOD BLD: ABNORMAL
EOSINOPHIL # BLD: 0 K/UL (ref 0–0.8)
EOSINOPHIL NFR BLD: 1 % (ref 0.5–7.8)
ERYTHROCYTE [DISTWIDTH] IN BLOOD BY AUTOMATED COUNT: 12.3 % (ref 11.9–14.6)
GLUCOSE SERPL-MCNC: 322 MG/DL (ref 65–100)
HCT VFR BLD AUTO: 45.9 % (ref 35.8–46.3)
HGB BLD-MCNC: 15.4 G/DL (ref 11.7–15.4)
IMM GRANULOCYTES # BLD AUTO: 0 K/UL (ref 0–0.5)
IMM GRANULOCYTES NFR BLD AUTO: 0 % (ref 0–5)
LYMPHOCYTES # BLD: 1.7 K/UL (ref 0.5–4.6)
LYMPHOCYTES NFR BLD: 28 % (ref 13–44)
MCH RBC QN AUTO: 33.3 PG (ref 26.1–32.9)
MCHC RBC AUTO-ENTMCNC: 33.6 G/DL (ref 31.4–35)
MCV RBC AUTO: 99.1 FL (ref 79.6–97.8)
MONOCYTES # BLD: 0.5 K/UL (ref 0.1–1.3)
MONOCYTES NFR BLD: 8 % (ref 4–12)
NEUTS SEG # BLD: 3.8 K/UL (ref 1.7–8.2)
NEUTS SEG NFR BLD: 63 % (ref 43–78)
NRBC # BLD: 0 K/UL (ref 0–0.2)
PLATELET # BLD AUTO: 245 K/UL (ref 150–450)
PMV BLD AUTO: 10.3 FL (ref 9.4–12.3)
POTASSIUM SERPL-SCNC: 3.8 MMOL/L (ref 3.5–5.1)
RBC # BLD AUTO: 4.63 M/UL (ref 4.05–5.2)
SODIUM SERPL-SCNC: 139 MMOL/L (ref 136–145)
WBC # BLD AUTO: 6 K/UL (ref 4.3–11.1)

## 2019-09-11 PROCEDURE — 85025 COMPLETE CBC W/AUTO DIFF WBC: CPT

## 2019-09-11 PROCEDURE — 36415 COLL VENOUS BLD VENIPUNCTURE: CPT

## 2019-09-11 PROCEDURE — 80048 BASIC METABOLIC PNL TOTAL CA: CPT

## 2019-09-15 NOTE — PROGRESS NOTES
Patient pre-assessment complete [address] with Dr Jaycee Mortimer, scheduled for 19 at 9:30am arrival time 7:30am. Patient verified using . Patient instructed to bring all home medications in labeled bottles on the day of procedure. NPO status reinforced. Patient informed to take a full dose plavix 75 mg on the day of procedure. Patient instructed to HOLD metformin (starting tonight, take 1/2 dose insulin tonight & HOLD actos in am . Instructed they can take all other medications excluding vitamins & supplements. Patient verbalizes understanding of all instructions & denies any questions at this time.

## 2019-09-16 ENCOUNTER — HOSPITAL ENCOUNTER (INPATIENT)
Dept: CARDIAC CATH/INVASIVE PROCEDURES | Age: 74
LOS: 11 days | Discharge: NURSING, REHAB OR LONG-TERM CARE | DRG: 234 | End: 2019-09-27
Attending: INTERNAL MEDICINE | Admitting: INTERNAL MEDICINE
Payer: MEDICARE

## 2019-09-16 DIAGNOSIS — R09.02 HYPOXIA: ICD-10-CM

## 2019-09-16 DIAGNOSIS — Z95.1 S/P CABG X 3: ICD-10-CM

## 2019-09-16 DIAGNOSIS — J98.11 ATELECTASIS: ICD-10-CM

## 2019-09-16 DIAGNOSIS — Z99.11 ENCOUNTER FOR WEANING FROM VENTILATOR (HCC): ICD-10-CM

## 2019-09-16 PROBLEM — I25.10 CAD (CORONARY ARTERY DISEASE): Status: ACTIVE | Noted: 2019-09-16

## 2019-09-16 LAB
APPEARANCE UR: CLEAR
APTT PPP: 46.6 SEC (ref 24.7–39.8)
ATRIAL RATE: 64 BPM
BASOPHILS # BLD: 0 K/UL (ref 0–0.2)
BASOPHILS NFR BLD: 0 % (ref 0–2)
BILIRUB UR QL: NEGATIVE
CALCULATED P AXIS, ECG09: 65 DEGREES
CALCULATED R AXIS, ECG10: 32 DEGREES
CALCULATED T AXIS, ECG11: 85 DEGREES
COLOR UR: YELLOW
DIAGNOSIS, 93000: NORMAL
DIFFERENTIAL METHOD BLD: ABNORMAL
EOSINOPHIL # BLD: 0.1 K/UL (ref 0–0.8)
EOSINOPHIL NFR BLD: 1 % (ref 0.5–7.8)
ERYTHROCYTE [DISTWIDTH] IN BLOOD BY AUTOMATED COUNT: 12.3 % (ref 11.9–14.6)
GLUCOSE BLD STRIP.AUTO-MCNC: 249 MG/DL (ref 65–100)
GLUCOSE BLD STRIP.AUTO-MCNC: 308 MG/DL (ref 65–100)
GLUCOSE BLD STRIP.AUTO-MCNC: 338 MG/DL (ref 65–100)
GLUCOSE BLD STRIP.AUTO-MCNC: 343 MG/DL (ref 65–100)
GLUCOSE UR STRIP.AUTO-MCNC: >1000 MG/DL
HCT VFR BLD AUTO: 41.4 % (ref 35.8–46.3)
HGB BLD-MCNC: 13.7 G/DL (ref 11.7–15.4)
HGB UR QL STRIP: NEGATIVE
IMM GRANULOCYTES # BLD AUTO: 0 K/UL (ref 0–0.5)
IMM GRANULOCYTES NFR BLD AUTO: 0 % (ref 0–5)
KETONES UR QL STRIP.AUTO: NEGATIVE MG/DL
LEUKOCYTE ESTERASE UR QL STRIP.AUTO: NEGATIVE
LYMPHOCYTES # BLD: 1.6 K/UL (ref 0.5–4.6)
LYMPHOCYTES NFR BLD: 23 % (ref 13–44)
MCH RBC QN AUTO: 33.3 PG (ref 26.1–32.9)
MCHC RBC AUTO-ENTMCNC: 33.1 G/DL (ref 31.4–35)
MCV RBC AUTO: 100.7 FL (ref 79.6–97.8)
MONOCYTES # BLD: 0.6 K/UL (ref 0.1–1.3)
MONOCYTES NFR BLD: 8 % (ref 4–12)
NEUTS SEG # BLD: 5 K/UL (ref 1.7–8.2)
NEUTS SEG NFR BLD: 69 % (ref 43–78)
NITRITE UR QL STRIP.AUTO: NEGATIVE
NRBC # BLD: 0 K/UL (ref 0–0.2)
P-R INTERVAL, ECG05: 144 MS
PH UR STRIP: 7 [PH] (ref 5–9)
PLATELET # BLD AUTO: 197 K/UL (ref 150–450)
PMV BLD AUTO: 11.1 FL (ref 9.4–12.3)
PROT UR STRIP-MCNC: NEGATIVE MG/DL
Q-T INTERVAL, ECG07: 406 MS
QRS DURATION, ECG06: 84 MS
QTC CALCULATION (BEZET), ECG08: 418 MS
RBC # BLD AUTO: 4.11 M/UL (ref 4.05–5.2)
SP GR UR REFRACTOMETRY: 1.02 (ref 1–1.02)
UROBILINOGEN UR QL STRIP.AUTO: 1 EU/DL (ref 0.2–1)
VENTRICULAR RATE, ECG03: 64 BPM
WBC # BLD AUTO: 7.2 K/UL (ref 4.3–11.1)

## 2019-09-16 PROCEDURE — 74011636637 HC RX REV CODE- 636/637: Performed by: INTERNAL MEDICINE

## 2019-09-16 PROCEDURE — 74011636320 HC RX REV CODE- 636/320: Performed by: INTERNAL MEDICINE

## 2019-09-16 PROCEDURE — 4A023N7 MEASUREMENT OF CARDIAC SAMPLING AND PRESSURE, LEFT HEART, PERCUTANEOUS APPROACH: ICD-10-PCS | Performed by: INTERNAL MEDICINE

## 2019-09-16 PROCEDURE — 74011250636 HC RX REV CODE- 250/636

## 2019-09-16 PROCEDURE — B2111ZZ FLUOROSCOPY OF MULTIPLE CORONARY ARTERIES USING LOW OSMOLAR CONTRAST: ICD-10-PCS | Performed by: INTERNAL MEDICINE

## 2019-09-16 PROCEDURE — 36415 COLL VENOUS BLD VENIPUNCTURE: CPT

## 2019-09-16 PROCEDURE — C1894 INTRO/SHEATH, NON-LASER: HCPCS

## 2019-09-16 PROCEDURE — 65660000000 HC RM CCU STEPDOWN

## 2019-09-16 PROCEDURE — 74011250636 HC RX REV CODE- 250/636: Performed by: INTERNAL MEDICINE

## 2019-09-16 PROCEDURE — 94010 BREATHING CAPACITY TEST: CPT

## 2019-09-16 PROCEDURE — 99153 MOD SED SAME PHYS/QHP EA: CPT

## 2019-09-16 PROCEDURE — 81003 URINALYSIS AUTO W/O SCOPE: CPT

## 2019-09-16 PROCEDURE — 99152 MOD SED SAME PHYS/QHP 5/>YRS: CPT

## 2019-09-16 PROCEDURE — 82962 GLUCOSE BLOOD TEST: CPT

## 2019-09-16 PROCEDURE — 85025 COMPLETE CBC W/AUTO DIFF WBC: CPT

## 2019-09-16 PROCEDURE — 85730 THROMBOPLASTIN TIME PARTIAL: CPT

## 2019-09-16 PROCEDURE — 93005 ELECTROCARDIOGRAM TRACING: CPT | Performed by: INTERNAL MEDICINE

## 2019-09-16 PROCEDURE — 77030029997 HC DEV COM RDL R BND TELE -B

## 2019-09-16 PROCEDURE — C1769 GUIDE WIRE: HCPCS

## 2019-09-16 PROCEDURE — 74011250637 HC RX REV CODE- 250/637: Performed by: INTERNAL MEDICINE

## 2019-09-16 PROCEDURE — 93458 L HRT ARTERY/VENTRICLE ANGIO: CPT

## 2019-09-16 PROCEDURE — 77030019569 HC BND COMPR RAD TERU -B

## 2019-09-16 PROCEDURE — 74011636637 HC RX REV CODE- 636/637: Performed by: FAMILY MEDICINE

## 2019-09-16 PROCEDURE — 74011000250 HC RX REV CODE- 250: Performed by: INTERNAL MEDICINE

## 2019-09-16 PROCEDURE — 77030004534 HC CATH ANGI DX INFN CARD -A

## 2019-09-16 RX ORDER — AMLODIPINE BESYLATE 5 MG/1
2.5 TABLET ORAL DAILY
Status: DISCONTINUED | OUTPATIENT
Start: 2019-09-16 | End: 2019-09-24

## 2019-09-16 RX ORDER — SODIUM CHLORIDE 0.9 % (FLUSH) 0.9 %
5-40 SYRINGE (ML) INJECTION AS NEEDED
Status: DISCONTINUED | OUTPATIENT
Start: 2019-09-16 | End: 2019-09-23 | Stop reason: SDUPTHER

## 2019-09-16 RX ORDER — PANTOPRAZOLE SODIUM 40 MG/1
40 TABLET, DELAYED RELEASE ORAL
Status: DISCONTINUED | OUTPATIENT
Start: 2019-09-17 | End: 2019-09-24

## 2019-09-16 RX ORDER — INSULIN LISPRO 100 [IU]/ML
INJECTION, SOLUTION INTRAVENOUS; SUBCUTANEOUS
Status: DISCONTINUED | OUTPATIENT
Start: 2019-09-16 | End: 2019-09-24

## 2019-09-16 RX ORDER — CLOPIDOGREL BISULFATE 75 MG/1
75 TABLET ORAL ONCE
Status: DISCONTINUED | OUTPATIENT
Start: 2019-09-16 | End: 2019-09-16 | Stop reason: HOSPADM

## 2019-09-16 RX ORDER — METOPROLOL SUCCINATE 25 MG/1
25 TABLET, EXTENDED RELEASE ORAL DAILY
Status: DISCONTINUED | OUTPATIENT
Start: 2019-09-16 | End: 2019-09-23 | Stop reason: SDUPTHER

## 2019-09-16 RX ORDER — HEPARIN SODIUM 5000 [USP'U]/100ML
12-25 INJECTION, SOLUTION INTRAVENOUS
Status: DISCONTINUED | OUTPATIENT
Start: 2019-09-16 | End: 2019-09-20 | Stop reason: SDUPTHER

## 2019-09-16 RX ORDER — ACETAMINOPHEN 325 MG/1
650 TABLET ORAL
Status: DISCONTINUED | OUTPATIENT
Start: 2019-09-16 | End: 2019-09-24

## 2019-09-16 RX ORDER — PIOGLITAZONEHYDROCHLORIDE 30 MG/1
30 TABLET ORAL DAILY
Status: DISCONTINUED | OUTPATIENT
Start: 2019-09-16 | End: 2019-09-27 | Stop reason: HOSPADM

## 2019-09-16 RX ORDER — HEPARIN SODIUM 5000 [USP'U]/ML
60 INJECTION, SOLUTION INTRAVENOUS; SUBCUTANEOUS ONCE
Status: COMPLETED | OUTPATIENT
Start: 2019-09-16 | End: 2019-09-16

## 2019-09-16 RX ORDER — INSULIN GLARGINE 100 [IU]/ML
26 INJECTION, SOLUTION SUBCUTANEOUS
Status: DISCONTINUED | OUTPATIENT
Start: 2019-09-16 | End: 2019-09-17

## 2019-09-16 RX ORDER — MONTELUKAST SODIUM 10 MG/1
10 TABLET ORAL DAILY
Status: DISCONTINUED | OUTPATIENT
Start: 2019-09-16 | End: 2019-09-24

## 2019-09-16 RX ORDER — GUAIFENESIN 100 MG/5ML
324 LIQUID (ML) ORAL ONCE
Status: DISCONTINUED | OUTPATIENT
Start: 2019-09-16 | End: 2019-09-16

## 2019-09-16 RX ORDER — DIPHENHYDRAMINE HCL 25 MG
25 CAPSULE ORAL
Status: DISCONTINUED | OUTPATIENT
Start: 2019-09-16 | End: 2019-09-17

## 2019-09-16 RX ORDER — MORPHINE SULFATE 2 MG/ML
1 INJECTION, SOLUTION INTRAMUSCULAR; INTRAVENOUS
Status: DISCONTINUED | OUTPATIENT
Start: 2019-09-16 | End: 2019-09-23 | Stop reason: SDUPTHER

## 2019-09-16 RX ORDER — HEPARIN SODIUM 200 [USP'U]/100ML
2 INJECTION, SOLUTION INTRAVENOUS CONTINUOUS
Status: DISCONTINUED | OUTPATIENT
Start: 2019-09-16 | End: 2019-09-16 | Stop reason: HOSPADM

## 2019-09-16 RX ORDER — NALOXONE HYDROCHLORIDE 0.4 MG/ML
0.4 INJECTION, SOLUTION INTRAMUSCULAR; INTRAVENOUS; SUBCUTANEOUS AS NEEDED
Status: DISCONTINUED | OUTPATIENT
Start: 2019-09-16 | End: 2019-09-23 | Stop reason: SDUPTHER

## 2019-09-16 RX ORDER — FENTANYL CITRATE 50 UG/ML
25-100 INJECTION, SOLUTION INTRAMUSCULAR; INTRAVENOUS
Status: DISCONTINUED | OUTPATIENT
Start: 2019-09-16 | End: 2019-09-16 | Stop reason: HOSPADM

## 2019-09-16 RX ORDER — HEPARIN SODIUM 10000 [USP'U]/ML
1000-10000 INJECTION, SOLUTION INTRAVENOUS; SUBCUTANEOUS
Status: DISCONTINUED | OUTPATIENT
Start: 2019-09-16 | End: 2019-09-16 | Stop reason: HOSPADM

## 2019-09-16 RX ORDER — SODIUM CHLORIDE 9 MG/ML
75 INJECTION, SOLUTION INTRAVENOUS CONTINUOUS
Status: DISCONTINUED | OUTPATIENT
Start: 2019-09-16 | End: 2019-09-18

## 2019-09-16 RX ORDER — HYDROCODONE BITARTRATE AND ACETAMINOPHEN 5; 325 MG/1; MG/1
1 TABLET ORAL
Status: DISCONTINUED | OUTPATIENT
Start: 2019-09-16 | End: 2019-09-23 | Stop reason: SDUPTHER

## 2019-09-16 RX ORDER — SODIUM CHLORIDE 9 MG/ML
75 INJECTION, SOLUTION INTRAVENOUS CONTINUOUS
Status: DISCONTINUED | OUTPATIENT
Start: 2019-09-16 | End: 2019-09-17 | Stop reason: SDUPTHER

## 2019-09-16 RX ORDER — CYCLOBENZAPRINE HCL 10 MG
10 TABLET ORAL
Status: DISCONTINUED | OUTPATIENT
Start: 2019-09-16 | End: 2019-09-27 | Stop reason: HOSPADM

## 2019-09-16 RX ORDER — ATORVASTATIN CALCIUM 40 MG/1
40 TABLET, FILM COATED ORAL DAILY
Status: DISCONTINUED | OUTPATIENT
Start: 2019-09-16 | End: 2019-09-23 | Stop reason: SDUPTHER

## 2019-09-16 RX ORDER — ONDANSETRON 2 MG/ML
4 INJECTION INTRAMUSCULAR; INTRAVENOUS
Status: ACTIVE | OUTPATIENT
Start: 2019-09-16 | End: 2019-09-17

## 2019-09-16 RX ORDER — SODIUM CHLORIDE 0.9 % (FLUSH) 0.9 %
5-40 SYRINGE (ML) INJECTION EVERY 8 HOURS
Status: DISCONTINUED | OUTPATIENT
Start: 2019-09-16 | End: 2019-09-23 | Stop reason: SDUPTHER

## 2019-09-16 RX ORDER — LIDOCAINE HYDROCHLORIDE 10 MG/ML
2-20 INJECTION INFILTRATION; PERINEURAL
Status: DISCONTINUED | OUTPATIENT
Start: 2019-09-16 | End: 2019-09-16 | Stop reason: HOSPADM

## 2019-09-16 RX ORDER — SODIUM CHLORIDE 0.9 % (FLUSH) 0.9 %
5 SYRINGE (ML) INJECTION AS NEEDED
Status: DISCONTINUED | OUTPATIENT
Start: 2019-09-16 | End: 2019-09-23 | Stop reason: SDUPTHER

## 2019-09-16 RX ORDER — MIDAZOLAM HYDROCHLORIDE 1 MG/ML
.5-2 INJECTION, SOLUTION INTRAMUSCULAR; INTRAVENOUS
Status: DISCONTINUED | OUTPATIENT
Start: 2019-09-16 | End: 2019-09-16 | Stop reason: HOSPADM

## 2019-09-16 RX ADMIN — ACETAMINOPHEN 650 MG: 325 TABLET, FILM COATED ORAL at 10:22

## 2019-09-16 RX ADMIN — HEPARIN SODIUM 12 UNITS/KG/HR: 5000 INJECTION, SOLUTION INTRAVENOUS at 18:16

## 2019-09-16 RX ADMIN — HEPARIN SODIUM 2 ML/HR: 5000 INJECTION, SOLUTION INTRAVENOUS; SUBCUTANEOUS at 09:30

## 2019-09-16 RX ADMIN — ACETAMINOPHEN 650 MG: 325 TABLET, FILM COATED ORAL at 18:27

## 2019-09-16 RX ADMIN — IOPAMIDOL 40 ML: 755 INJECTION, SOLUTION INTRAVENOUS at 09:50

## 2019-09-16 RX ADMIN — Medication 10 ML: at 21:16

## 2019-09-16 RX ADMIN — INSULIN GLARGINE 26 UNITS: 100 INJECTION, SOLUTION SUBCUTANEOUS at 21:13

## 2019-09-16 RX ADMIN — INSULIN LISPRO 8 UNITS: 100 INJECTION, SOLUTION INTRAVENOUS; SUBCUTANEOUS at 17:18

## 2019-09-16 RX ADMIN — MIDAZOLAM HYDROCHLORIDE 2 MG: 1 INJECTION, SOLUTION INTRAMUSCULAR; INTRAVENOUS at 09:36

## 2019-09-16 RX ADMIN — METOPROLOL SUCCINATE 25 MG: 25 TABLET, EXTENDED RELEASE ORAL at 13:53

## 2019-09-16 RX ADMIN — PIOGLITAZONE 30 MG: 30 TABLET ORAL at 13:53

## 2019-09-16 RX ADMIN — LIDOCAINE HYDROCHLORIDE 4 ML: 10 INJECTION, SOLUTION INFILTRATION; PERINEURAL at 09:37

## 2019-09-16 RX ADMIN — HEPARIN SODIUM 2 ML: 10000 INJECTION, SOLUTION INTRAVENOUS; SUBCUTANEOUS at 09:39

## 2019-09-16 RX ADMIN — AMLODIPINE BESYLATE 2.5 MG: 5 TABLET ORAL at 13:53

## 2019-09-16 RX ADMIN — Medication 10 ML: at 13:57

## 2019-09-16 RX ADMIN — SODIUM CHLORIDE 75 ML/HR: 900 INJECTION, SOLUTION INTRAVENOUS at 07:47

## 2019-09-16 RX ADMIN — ATORVASTATIN CALCIUM 40 MG: 40 TABLET, FILM COATED ORAL at 13:54

## 2019-09-16 RX ADMIN — FENTANYL CITRATE 50 MCG: 50 INJECTION, SOLUTION INTRAMUSCULAR; INTRAVENOUS at 09:36

## 2019-09-16 RX ADMIN — INSULIN LISPRO 8 UNITS: 100 INJECTION, SOLUTION INTRAVENOUS; SUBCUTANEOUS at 21:13

## 2019-09-16 RX ADMIN — MONTELUKAST SODIUM 10 MG: 10 TABLET, FILM COATED ORAL at 13:53

## 2019-09-16 RX ADMIN — HEPARIN SODIUM 4250 UNITS: 5000 INJECTION INTRAVENOUS; SUBCUTANEOUS at 18:15

## 2019-09-16 NOTE — PROGRESS NOTES
TRANSFER - OUT REPORT:    Verbal report given to Gulshan Wolfe RN(name) on [employer]  being transferred to CV step down(unit) for routine progression of care       Report consisted of patients Situation, Background, Assessment and   Recommendations(SBAR). Information from the following report(s) Procedure Summary was reviewed with the receiving nurse. Lines:   Peripheral IV 09/16/19 Anterior;Distal;Left Forearm (Active)       Peripheral IV 09/16/19 Right Antecubital (Active)        Opportunity for questions and clarification was provided.       Patient transported with:   [employer]

## 2019-09-16 NOTE — PROGRESS NOTES
Removed 2ml of air from right radial TR band no bleeding or hematoma noted will continue to monitor.

## 2019-09-16 NOTE — PROGRESS NOTES
TRANSFER - IN REPORT:    Verbal report received from [facility] Meche Garcia RN(name) on [address]  being received from Cath Lab recovery(unit) for routine progression of care      Report consisted of patients Situation, Background, Assessment and   Recommendations(SBAR). Information from the following report(s) SBAR, Kardex, Procedure Summary, Intake/Output, MAR and Cardiac Rhythm NSR was reviewed with the receiving nurse. Opportunity for questions and clarification was provided. Assessment completed upon patients arrival to unit and care assumed. Dual nurse skin assessment performed. No areas of breakdown noted. Blanchable redness noted to sacrum. TR band in place to right radial cath site.

## 2019-09-16 NOTE — PROCEDURES
[facility]      Name:  Lydia Mitchell  MR#:  [id number]  :  1945  ACCOUNT #:  [de-identified]  DATE OF SERVICE:  2019        Spirometry.     INTERPRETATION:  The flow volume loop is normal.  Spirometry is normal.      Beau Chavez MD      [id number]  D:  2019 17:34  T:  2019 19:32  JOB #:  [id number]

## 2019-09-16 NOTE — ACP (ADVANCE CARE PLANNING)
[employer] for [employer] received request to offer assistance with [address]. Spoke with nurse to ensure that patient was sufficiently alert and oriented to proceed with consult. Reviewed information with Ms. Ashia Winter and family. Patient completed an Advance Medical Directive. Original returned to patient and copy provided to unit staff to have scanned into the medical record. Rev.  Fredrick Bell MDiv, BS  Board Certified

## 2019-09-16 NOTE — PROGRESS NOTES
Patient states she has allergy to ASA that consist of hives and itching. Patient taking Plavix 75 mg daily.

## 2019-09-16 NOTE — CONSULTS
Daniel Hoyt. MD Kathryn Maldonado. Beryle Bee, MD Ellouise Ruths         9/16/2019 1945    REFERRING PHYSICIAN:  Dr. Sony Corbin:  The patient is a 75 y.o. female who was seen in the office by Dr. Katey Dawkins for complaints of SOMMERS. Stress test showed small reversible anteroapical ischemia. LHC was planned. She underwent cardiac  Catheterization today that showed severe multivessel disease. She states she has noted worsening bilateral leg pain with exertion and has occasional chest tightness while doing housework and light activity. She used a walker for several weeks in the past after ankle fracture. She intermittently has to use a cane or walker now when her ankle hurts or \"gives out. \"   She reports a TIA approximately 30 years ago and states she had facial droop with this. She denies any recurrent neurological symptoms. Risk factors include prior TIA, uncontrolled DM, HTN, dyslipidemia    ** No history of stroke, prior cardiac surgery, prior vascular surgery, anesthetic complication, lung disease, DVT or PE, GI bleeding   She had an ulcer in the past that reportedly healed.        Past Medical History:   Diagnosis Date    Anxiety and depression     Arthritis     CAD (coronary artery disease) 9/16/2019    Chest discomfort     Apr 2014 - Lexiscan MPI - normal EKG, perfusion images and LVEF    Chronic back pain     [facility]     DM2 (diabetes mellitus, type 2) ([facility] 6/21/2012    Frequent UTI     GERD (gastroesophageal reflux disease)     H/O seasonal allergies     Heart murmur     Hiatal hernia     History of kidney stones     HLD (hyperlipidemia) 6/21/2012    HTN (hypertension) 6/21/2012    Hypothyroidism     Psychiatric disorder     Varicose veins 6/21/2012       Past Surgical History:   Procedure Laterality Date    ENDOSCOPY, COLON, DIAGNOSTIC  2011    two polyps    HX APPENDECTOMY  1976    HX BREAST BIOPSY      HX CATARACT REMOVAL  2002 and 2006    bilateral    HX CHOLECYSTECTOMY  1997    HX CYST REMOVAL  1973    wrist    HX GYN  1997    vaginal repair    HX HEART CATHETERIZATION  2007    left side    HX HERNIA REPAIR  1997    HX HYSTERECTOMY  1982    HX MOHS PROCEDURES      HX ORTHOPAEDIC  1985    ankle    HX TUBAL LIGATION  1976    bilateral    HX UROLOGICAL  1997    bladder tack       Family History   Problem Relation Age of Onset    Diabetes Mother     Colon Polyps Mother     Coronary Artery Disease Father     Diabetes Sister     Coronary Artery Disease Sister     Coronary Artery Disease Brother     Diabetes Brother     Breast Cancer Paternal Aunt 67       Social History     Socioeconomic History    Marital status:      Spouse name: Not on file    Number of children: Not on file    Years of education: Not on file    Highest education level: Not on file   Occupational History    Not on file   Social Needs    Financial resource strain: Not on file    Food insecurity:     Worry: Not on file     Inability: Not on file    Transportation needs:     Medical: Not on file     Non-medical: Not on file   Tobacco Use    Smoking status: Passive Smoke Exposure - Never Smoker    Smokeless tobacco: Never Used   Substance and Sexual Activity    Alcohol use: No     Alcohol/week: 0.0 standard drinks    Drug use: No    Sexual activity: Not on file   Lifestyle    Physical activity:     Days per week: Not on file     Minutes per session: Not on file    Stress: Not on file   Relationships    Social connections:     Talks on phone: Not on file     Gets together: Not on file     Attends Muslim service: Not on file     Active member of club or organization: Not on file     Attends meetings of clubs or organizations: Not on file     Relationship status: Not on file    Intimate partner violence:     Fear of current or ex partner: Not on file     Emotionally abused: Not on file Physically abused: Not on file     Forced sexual activity: Not on file   Other Topics Concern    Not on file   Social History Narrative    Not on file       Allergies   Allergen Reactions    Aspirin Itching    Codeine Itching and Swelling    Keflex [Cephalexin] Rash    Pcn [Penicillins] Rash    Sulfa Dyne Itching       No current facility-administered medications on file prior to encounter. Current Outpatient Medications on File Prior to Encounter   Medication Sig Dispense Refill    metoprolol succinate (TOPROL XL) 25 mg XL tablet Take 1 Tab by mouth daily. 30 Tab 11    amLODIPine (NORVASC) 2.5 mg tablet Take 1 Tab by mouth daily. 30 Tab 11    clopidogrel (PLAVIX) 75 mg tab Take 1 Tab by mouth daily. 30 Tab 5    omeprazole (PRILOSEC) 40 mg capsule Take 1 Cap by mouth daily. 30 Cap 5    insulin degludec (TRESIBA FLEXTOUCH U-100) 100 unit/mL (3 mL) inpn Use 26 units QHS 5 Pen 5    metFORMIN (GLUCOPHAGE) 500 mg tablet TAKE TWO TABLETS BY MOUTH TWICE DAILY WITH MEALS 360 Tab 3    pioglitazone (ACTOS) 30 mg tablet TAKE ONE TABLET BY MOUTH EVERY DAY 90 Tab 3    atorvastatin (LIPITOR) 40 mg tablet Take 1 Tab by mouth daily. 90 Tab 3    montelukast (SINGULAIR) 10 mg tablet Take 1 Tab by mouth daily. 30 Tab 5    ferrous sulfate 325 mg (65 mg iron) tablet Take 65 mg by mouth Daily (before breakfast).  biotin 1 mg tab Take  by mouth daily.  MULTIVITAMIN (MULTIPLE VITAMIN PO) Take 2 Tabs by mouth daily.  cyclobenzaprine (FLEXERIL) 5 mg tablet Take 2 Tabs by mouth three (3) times daily as needed for Muscle Spasm(s). 20 Tab 0    acetaminophen (TYLENOL) 500 mg tablet Take 1,000 mg by mouth two (2) times a day.  diphenhydrAMINE (BENADRYL) 25 mg tablet Take 50 mg by mouth nightly as needed. REVIEW OF SYSTEMS:  Review of Systems   Constitution: Negative for chills, fever, malaise/fatigue, weight gain and weight loss.    HENT: Negative for ear pain, hearing loss, nosebleeds, sore throat and tinnitus. Eyes: Negative for blurred vision, vision loss in left eye and vision loss in right eye. Cardiovascular: Positive for chest pain and syncope. Negative for dyspnea on exertion, leg swelling, near-syncope, orthopnea, palpitations and paroxysmal nocturnal dyspnea. Respiratory: Positive for shortness of breath. Negative for cough, hemoptysis, sputum production and wheezing. Endocrine: Negative for cold intolerance, heat intolerance and polydipsia. Hematologic/Lymphatic: Does not bruise/bleed easily. Skin: Negative for color change and rash. Musculoskeletal: Negative for back pain, joint pain, joint swelling and myalgias. Leg pain   Gastrointestinal: Negative for abdominal pain, constipation, diarrhea, dysphagia, heartburn, hematemesis, melena, nausea and vomiting. Genitourinary: Negative for dysuria, frequency, hematuria and urgency. Neurological: Negative for difficulty with concentration, dizziness, headaches, light-headedness, numbness, paresthesias, seizures, vertigo and weakness. Psychiatric/Behavioral: Negative for altered mental status and depression. Physical Exam  Vitals:    09/16/19 0950 09/16/19 1003 09/16/19 1025 09/16/19 1030   BP: 106/55 140/66 147/63 147/63   Pulse: 69 68 71 69   Resp: 16 10 22 17   SpO2: 99% 96% 100% 100%   Weight:       Height:           Physical Exam:  General: Well Developed, Well Nourished, No Acute Distress  HEENT: Normocephalic, pupils equal and round, no scleral icterus  Neck: supple, no JVD  Chest wall: No deformity  Heart: S1S2 with RRR without murmurs or gallops  Lungs: Clear throughout auscultation bilaterally without adventitious sounds  Vascular: Pulses are full and equal. There is no venous stasis disease.   Abd: soft, nontender, nondistended, with good bowel sounds, no pulsatile masses  Ext: warm, no edema, calves supple/nontender, pulses 2+ bilaterally  Skin: warm and dry, no rashes, cyanosis, jaundice, ecchymoses or evidence of skin breakdown  Psychiatric: Normal mood and affect  Neurologic: Alert and oriented X 3, no focal deficit noted    Assessment:     Active Problems:    CAD (coronary artery disease) (9/16/2019)    DM2 (diabetes mellitus[facility] type 2) [facility] (6/21/2012)    HLD (hyperlipidemia) (6/21/2012)    HTN (hypertension) (6/21/2012)       Plan:     Missael Gonzalez is to see preoperative teaching film that thoroughly discusses procedure, risks, and possible complications. Risks, benefits, and alternatives were discussed to include, but not limited to:     1. Bleeding  2. Arrhythmia   3. Infection including mediastinitis  4. Myocardial infarction  5. Need for reoperation  6. Renal failure  7. Respiratory failure  8. Stroke  9. Potential death      Pt is on Plavix, just started last week but last dose was this AM 9/16. Dr. Adarsh Sarah will personally view the cardiac catheterization films and labs. Echocardiogram is pending. Pt wishes to proceed with CABG surgery after Plavix washout. Will monitor BGL and check Hgb A1C but will likely need to consult hospitalist service. Pt states her glucometers at home frequently read \"high. \" She assumed they were not working. She is informed that this typically means that her BGL is over a certain limit. She also states she had a syncopal episode over the weekend and regained consciousness after family gave her peppermint candy.      Jodelle Rubinstein, PA-C

## 2019-09-16 NOTE — PROGRESS NOTES
Patient received to [address] [place] [address] room # 6  Ambulatory from [facility]. Patient scheduled for [facility] today with Dr Snow Duckworth. Procedure reviewed & questions answered, voiced good understanding consent obtained & placed on chart. All medications and medical history reviewed. Will prep patient per orders. Patient & family updated on plan of care. The patient has a fraility score of 3-MANAGING WELL, based on ability to perform ADLS by self.

## 2019-09-16 NOTE — PROGRESS NOTES
Bedside shift change report given to Salome Villafana (oncoming nurse) by Kan Sun RN (offgoing nurse). Report included the following information SBAR, Kardex, Intake/Output, MAR, Recent Results, Med Rec Status and Cardiac Rhythm NSR.

## 2019-09-16 NOTE — PROGRESS NOTES
Oozing noted under TR band when removing 2ml of air. 4ml replaced for a total of 14ml in the band. Will monitor and deflate band per protocol.

## 2019-09-16 NOTE — PROGRESS NOTES
Removed 2ml of air from right radial TR band no bleeding or hematoma noted. Will continue to monitor.

## 2019-09-16 NOTE — PROCEDURES
Brief Cardiac Procedure Note    Patient: Kevin Robles MRN: [id number]  SSN: [id number]    YOB: 1945  Age: 75 y.o. Sex: female      Date of Procedure: 9/16/2019     Pre-procedure Diagnosis: Atypical Angina    Post-procedure Diagnosis: Coronary Artery Disease    Reason for Procedure: New Onset Angina < or = 2 Months    Procedure: Left Heart Catheterization    Brief Description of Procedure: LHC via R radial artery    Performed By: Elgin Cali MD     Assistants: none    Anesthesia: Moderate Sedation    Estimated Blood Loss: Less than 10 mL      Specimens: None    Implants: None    Findings: LM normal, pLAD 99% sub total, Circ dominated by to large OMs, OM1 95% , OM2 90% (technically mCirc), RCA luminal irregs with prox 30%, distal 40%. R to L collateral flow to the LAD. Complications: None    Recommendations: Refer for CABG surgery, admit for Plavix washout, heparin gtt.     Signed By: Elgin Cali MD     September 16, 2019

## 2019-09-16 NOTE — CONSULTS
HOSPITALIST CONSULT  NAME:  Devin Siddiqi   Age:  75 y.o.  :   1945   MRN:   [id number]  PCP: Yara Ruiz MD  Consulting MD:  Treatment Team: Attending Provider: Renetta Rogers MD; Consulting Provider: Angelita Diamond MD; Care Manager: Tc Zabala; Utilization Review: Jeff Goodwin RN; Staff Nurse: Di Raymond, RN; Primary Nurse: Tayler Sarah RN  HPI:   Patient is a 65yo F with hx DM, HTN, CAD, and remote TIA admitted for cath, found to have severe multivessel disease and now pending CABG after plavix washout. Hospitalist service consulted for uncontrolled DM    Last a1c 12.9 on  - PCP had her on invokana, metformin, glipizide, actos, and 24u tresbia qhs. Pt not sure about invokana or glipizide, but reports intermittent use of the others. A1c pending here. Cost and compliance are historical barriers to her management.    She rarely checks blood sugar because it always says \"high\"      Complete ROS done and is as stated in HPI or otherwise negative  Past Medical History:   Diagnosis Date    Anxiety and depression     Arthritis     CAD (coronary artery disease) 2019    Chest discomfort     2014 - Lexiscan MPI - normal EKG, perfusion images and LVEF    Chronic back pain     [facility]) ([facility]     DM2 (diabetes mellitus, type 2) ([facility] 2012    Frequent UTI     GERD (gastroesophageal reflux disease)     H/O seasonal allergies     Heart murmur     Hiatal hernia     History of kidney stones     HLD (hyperlipidemia) 2012    HTN (hypertension) 2012    Hypothyroidism     Psychiatric disorder     Varicose veins 2012      Past Surgical History:   Procedure Laterality Date    ENDOSCOPY, COLON, DIAGNOSTIC      two polyps    HX APPENDECTOMY      HX BREAST BIOPSY      HX CATARACT REMOVAL   and     bilateral    HX CHOLECYSTECTOMY      HX CYST REMOVAL  1973    wrist  HX GYN  1997    vaginal repair    HX HEART CATHETERIZATION  2007    left side    HX HERNIA REPAIR  1997    HX HYSTERECTOMY  1982    HX MOHS PROCEDURES      HX ORTHOPAEDIC  1985    ankle    HX TUBAL LIGATION  1976    bilateral    HX UROLOGICAL  1997    bladder tack    reviewed  Prior to Admission Medications   Prescriptions Last Dose Informant Patient Reported? Taking? MULTIVITAMIN (MULTIPLE VITAMIN PO) 9/15/2019 at Unknown time  Yes Yes   Sig: Take 2 Tabs by mouth daily. acetaminophen (TYLENOL) 500 mg tablet Unknown at Unknown time  Yes No   Sig: Take 1,000 mg by mouth two (2) times a day. amLODIPine (NORVASC) 2.5 mg tablet 9/15/2019 at Unknown time  No Yes   Sig: Take 1 Tab by mouth daily. atorvastatin (LIPITOR) 40 mg tablet 9/15/2019 at Unknown time  No Yes   Sig: Take 1 Tab by mouth daily. biotin 1 mg tab 9/15/2019 at Unknown time  Yes Yes   Sig: Take  by mouth daily. clopidogrel (PLAVIX) 75 mg tab 9/16/2019 at 0756  No Yes   Sig: Take 1 Tab by mouth daily. cyclobenzaprine (FLEXERIL) 5 mg tablet Unknown at Unknown time  No No   Sig: Take 2 Tabs by mouth three (3) times daily as needed for Muscle Spasm(s). diphenhydrAMINE (BENADRYL) 25 mg tablet 9/15/2019 at Unknown time  Yes Yes   Sig: Take 50 mg by mouth two (2) times a day. ferrous sulfate 325 mg (65 mg iron) tablet 9/15/2019 at Unknown time  Yes Yes   Sig: Take 65 mg by mouth Daily (before breakfast). insulin degludec (TRESIBA FLEXTOUCH U-100) 100 unit/mL (3 mL) inpn 9/15/2019 at Unknown time  No Yes   Sig: Use 26 units QHS   metFORMIN (GLUCOPHAGE) 500 mg tablet 9/15/2019 at Unknown time  No Yes   Sig: TAKE TWO TABLETS BY MOUTH TWICE DAILY WITH MEALS   metoprolol succinate (TOPROL XL) 25 mg XL tablet 9/15/2019 at Unknown time  No Yes   Sig: Take 1 Tab by mouth daily. montelukast (SINGULAIR) 10 mg tablet 9/15/2019 at Unknown time  No Yes   Sig: Take 1 Tab by mouth daily.    omeprazole (PRILOSEC) 40 mg capsule 9/15/2019 at Unknown time  No Yes   Sig: Take 1 Cap by mouth daily. pioglitazone (ACTOS) 30 mg tablet 9/15/2019 at Unknown time  No Yes   Sig: TAKE ONE TABLET BY MOUTH EVERY DAY      Facility-Administered Medications: None     Allergies   Allergen Reactions    Aspirin Itching    Codeine Itching and Swelling    Keflex [Cephalexin] Rash    Pcn [Penicillins] Rash    Sulfa Dyne Itching      Social History     Tobacco Use    Smoking status: Passive Smoke Exposure - Never Smoker    Smokeless tobacco: Never Used   Substance Use Topics    Alcohol use: No     Alcohol/week: 0.0 standard drinks      Family History   Problem Relation Age of Onset    Diabetes Mother     Colon Polyps Mother     Coronary Artery Disease Father     Diabetes Sister     Coronary Artery Disease Sister     Coronary Artery Disease Brother     Diabetes Brother     Breast Cancer Paternal Aunt 67    reviewed  Objective:     Visit Vitals  /56   Pulse 63   Resp 16   Ht 5' 2\" (1.575 m)   Wt 70.8 kg (156 lb)   SpO2 94%   Breastfeeding? No   BMI 28.53 kg/m²      No data recorded. Oxygen Therapy  O2 Sat (%): 94 % (09/16/19 1123)  Pulse via Oximetry: 68 beats per minute (09/16/19 1123)  O2 Device: Room air (09/16/19 1107)  O2 Flow Rate (L/min): 2 l/min (09/16/19 0950)  Physical Exam:  General:    Alert, cooperative, no distress, appears stated age. Head:   Normocephalic, without obvious abnormality, atraumatic. Nose:  Nares normal. No drainage or sinus tenderness. Lungs:   Clear to auscultation bilaterally. No Wheezing or Rhonchi. No rales. Heart:   Regular rate and rhythm,  no murmur, rub or gallop. Abdomen:   Soft, non-tender. Not distended. Bowel sounds normal.   Extremities: No cyanosis. No edema. No clubbing  Skin:     Texture, turgor normal. No rashes or lesions.   Not Jaundiced  Neurologic: Alert and oriented x 3, no focal deficits   Data Review:   Recent Results (from the past 24 hour(s))   GLUCOSE, POC    Collection Time: 09/16/19  7:54 AM Result Value Ref Range    Glucose (POC) 249 (H) 65 - 100 mg/dL   EKG, 12 LEAD, INITIAL    Collection Time: 09/16/19  8:28 AM   Result Value Ref Range    Ventricular Rate 64 BPM    Atrial Rate 64 BPM    P-R Interval 144 ms    QRS Duration 84 ms    Q-T Interval 406 ms    QTC Calculation (Bezet) 418 ms    Calculated P Axis 65 degrees    Calculated R Axis 32 degrees    Calculated T Axis 85 degrees    Diagnosis       Normal sinus rhythm  Nonspecific T wave abnormality  Abnormal ECG  No previous ECGs available     CBC WITH AUTOMATED DIFF    Collection Time: 09/16/19 12:27 PM   Result Value Ref Range    WBC 7.2 4.3 - 11.1 K/uL    RBC 4.11 4.05 - 5.2 M/uL    HGB 13.7 11.7 - 15.4 g/dL    HCT 41.4 35.8 - 46.3 %    .7 (H) 79.6 - 97.8 FL    MCH 33.3 (H) 26.1 - 32.9 PG    MCHC 33.1 31.4 - 35.0 g/dL    RDW 12.3 11.9 - 14.6 %    PLATELET 869 016 - 863 K/uL    MPV 11.1 9.4 - 12.3 FL    ABSOLUTE NRBC 0.00 0.0 - 0.2 K/uL    DF AUTOMATED      NEUTROPHILS 69 43 - 78 %    LYMPHOCYTES 23 13 - 44 %    MONOCYTES 8 4.0 - 12.0 %    EOSINOPHILS 1 0.5 - 7.8 %    BASOPHILS 0 0.0 - 2.0 %    IMMATURE GRANULOCYTES 0 0.0 - 5.0 %    ABS. NEUTROPHILS 5.0 1.7 - 8.2 K/UL    ABS. LYMPHOCYTES 1.6 0.5 - 4.6 K/UL    ABS. MONOCYTES 0.6 0.1 - 1.3 K/UL    ABS. EOSINOPHILS 0.1 0.0 - 0.8 K/UL    ABS. BASOPHILS 0.0 0.0 - 0.2 K/UL    ABS. IMM. GRANS. 0.0 0.0 - 0.5 K/UL   PTT    Collection Time: 09/16/19 12:27 PM   Result Value Ref Range    aPTT 46.6 (H) 24.7 - 39.8 SEC   GLUCOSE, POC    Collection Time: 09/16/19 12:38 PM   Result Value Ref Range    Glucose (POC) 308 (H) 65 - 100 mg/dL     Imaging /Procedures /Studies   DUPLEX CAROTID BILATERAL    (Results Pending)   XR CHEST PA LAT    (Results Pending)   DUPLEX UPPER EXT VEIN MAP BILAT    (Results Pending)       Assessment and Plan:      Active Hospital Problems    Diagnosis Date Noted    CAD (coronary artery disease) 09/16/2019     Priority: 1 - One    DM2 (diabetes mellitus, type 2) ([facility] [address] 06/21/2012 followed by PCP        HLD (hyperlipidemia) 06/21/2012     followed by PCP        HTN (hypertension) 06/21/2012     readings in target range         PLAN:  DM:  Agree with continuing actos and lantus 26U QHS. Add sliding scale ac/hs. Consider premeal as well, pending insulin requirement.   A1c pending    Signed By: Hari Finney MD     September 16, 2019

## 2019-09-16 NOTE — PROGRESS NOTES
TRANSFER - OUT REPORT:    Verbal report given to Conception Mohamud RN(name) on [employer]  being transferred to CPRU(unit) for routine progression of care       Report consisted of patients Situation, Background, Assessment and   Recommendations(SBAR). Information from the following report(s) Procedure Summary, Intake/Output and Cardiac Rhythm SR was reviewed with the receiving nurse. Lines:   Peripheral IV 09/16/19 Anterior;Distal;Left Forearm (Active)       Peripheral IV 09/16/19 Right Antecubital (Active)        Opportunity for questions and clarification was provided.       Patient transported with:   Registered Nurse     [facility] only  Right radial access - TR band @ 0951 w/ 14 ml air in band - site C/D/I  Versed 2 mg IV  Fent 50 mcg IV

## 2019-09-16 NOTE — PROGRESS NOTES
[employer] for [employer] received request to offer assistance with [address]. Spoke with nurse to ensure that patient was sufficiently alert and oriented to proceed with consult. Reviewed information with Ms. Charbel Alberts and family. Patient completed an [employer] Directive. Original returned to patient and copy provided to unit staff to have scanned into the medical record. Rev.  Siri Masters MDiv, BS  Board Certified

## 2019-09-16 NOTE — PROGRESS NOTES
Attempted to remove TR band from right radial when velcro loosened oozing noted from puncture site. Reinflated TR band with 4ml of air. Will began to deflate again in 15min. Will continue to monitor.

## 2019-09-16 NOTE — PROGRESS NOTES
TRANSFER - IN REPORT:    Verbal report received from Babak Barahona RN(name) on [address]  being received from cath lab(unit) for routine progression of care      Report consisted of patients Situation, Background, Assessment and   Recommendations(SBAR). Information from the following report(s) Procedure Summary was reviewed with the receiving nurse. Opportunity for questions and clarification was provided. Assessment completed upon patients arrival to unit and care assumed.

## 2019-09-17 ENCOUNTER — APPOINTMENT (OUTPATIENT)
Dept: ULTRASOUND IMAGING | Age: 74
DRG: 234 | End: 2019-09-17
Attending: PHYSICIAN ASSISTANT
Payer: MEDICARE

## 2019-09-17 ENCOUNTER — APPOINTMENT (OUTPATIENT)
Dept: ULTRASOUND IMAGING | Age: 74
DRG: 234 | End: 2019-09-17
Attending: THORACIC SURGERY (CARDIOTHORACIC VASCULAR SURGERY)
Payer: MEDICARE

## 2019-09-17 ENCOUNTER — APPOINTMENT (OUTPATIENT)
Dept: GENERAL RADIOLOGY | Age: 74
DRG: 234 | End: 2019-09-17
Attending: PHYSICIAN ASSISTANT
Payer: MEDICARE

## 2019-09-17 LAB
ALBUMIN SERPL-MCNC: 2.9 G/DL (ref 3.2–4.6)
ALBUMIN/GLOB SERPL: 0.9 {RATIO} (ref 1.2–3.5)
ALP SERPL-CCNC: 103 U/L (ref 50–136)
ALT SERPL-CCNC: 17 U/L (ref 12–65)
ANION GAP SERPL CALC-SCNC: 6 MMOL/L (ref 7–16)
APTT PPP: 101.8 SEC (ref 24.7–39.8)
APTT PPP: 105.7 SEC (ref 24.7–39.8)
APTT PPP: 63.5 SEC (ref 24.7–39.8)
APTT PPP: 66.2 SEC (ref 24.7–39.8)
AST SERPL-CCNC: 14 U/L (ref 15–37)
BACTERIA SPEC CULT: NORMAL
BASOPHILS # BLD: 0 K/UL (ref 0–0.2)
BASOPHILS NFR BLD: 1 % (ref 0–2)
BILIRUB SERPL-MCNC: 0.5 MG/DL (ref 0.2–1.1)
BUN SERPL-MCNC: 10 MG/DL (ref 8–23)
CALCIUM SERPL-MCNC: 8.7 MG/DL (ref 8.3–10.4)
CHLORIDE SERPL-SCNC: 107 MMOL/L (ref 98–107)
CO2 SERPL-SCNC: 29 MMOL/L (ref 21–32)
CREAT SERPL-MCNC: 0.53 MG/DL (ref 0.6–1)
DIFFERENTIAL METHOD BLD: ABNORMAL
EOSINOPHIL # BLD: 0.1 K/UL (ref 0–0.8)
EOSINOPHIL NFR BLD: 2 % (ref 0.5–7.8)
ERYTHROCYTE [DISTWIDTH] IN BLOOD BY AUTOMATED COUNT: 12.3 % (ref 11.9–14.6)
EST. AVERAGE GLUCOSE BLD GHB EST-MCNC: 295 MG/DL
GLOBULIN SER CALC-MCNC: 3.3 G/DL (ref 2.3–3.5)
GLUCOSE BLD STRIP.AUTO-MCNC: 229 MG/DL (ref 65–100)
GLUCOSE BLD STRIP.AUTO-MCNC: 236 MG/DL (ref 65–100)
GLUCOSE BLD STRIP.AUTO-MCNC: 249 MG/DL (ref 65–100)
GLUCOSE BLD STRIP.AUTO-MCNC: 269 MG/DL (ref 65–100)
GLUCOSE BLD STRIP.AUTO-MCNC: 271 MG/DL (ref 65–100)
GLUCOSE SERPL-MCNC: 253 MG/DL (ref 65–100)
HBA1C MFR BLD: 11.9 % (ref 4.8–6)
HCT VFR BLD AUTO: 40.9 % (ref 35.8–46.3)
HGB BLD-MCNC: 13.5 G/DL (ref 11.7–15.4)
IMM GRANULOCYTES # BLD AUTO: 0 K/UL (ref 0–0.5)
IMM GRANULOCYTES NFR BLD AUTO: 0 % (ref 0–5)
INR PPP: 1
LYMPHOCYTES # BLD: 2.1 K/UL (ref 0.5–4.6)
LYMPHOCYTES NFR BLD: 32 % (ref 13–44)
MAGNESIUM SERPL-MCNC: 1.7 MG/DL (ref 1.8–2.4)
MCH RBC QN AUTO: 33.3 PG (ref 26.1–32.9)
MCHC RBC AUTO-ENTMCNC: 33 G/DL (ref 31.4–35)
MCV RBC AUTO: 100.7 FL (ref 79.6–97.8)
MONOCYTES # BLD: 0.6 K/UL (ref 0.1–1.3)
MONOCYTES NFR BLD: 10 % (ref 4–12)
NEUTS SEG # BLD: 3.6 K/UL (ref 1.7–8.2)
NEUTS SEG NFR BLD: 56 % (ref 43–78)
NRBC # BLD: 0 K/UL (ref 0–0.2)
PLATELET # BLD AUTO: 202 K/UL (ref 150–450)
PMV BLD AUTO: 10.9 FL (ref 9.4–12.3)
POTASSIUM SERPL-SCNC: 3.6 MMOL/L (ref 3.5–5.1)
PROT SERPL-MCNC: 6.2 G/DL (ref 6.3–8.2)
PROTHROMBIN TIME: 13.1 SEC (ref 11.7–14.5)
RBC # BLD AUTO: 4.06 M/UL (ref 4.05–5.2)
SERVICE CMNT-IMP: NORMAL
SODIUM SERPL-SCNC: 142 MMOL/L (ref 136–145)
WBC # BLD AUTO: 6.5 K/UL (ref 4.3–11.1)

## 2019-09-17 PROCEDURE — 74011250636 HC RX REV CODE- 250/636: Performed by: INTERNAL MEDICINE

## 2019-09-17 PROCEDURE — 74011250637 HC RX REV CODE- 250/637: Performed by: INTERNAL MEDICINE

## 2019-09-17 PROCEDURE — 87641 MR-STAPH DNA AMP PROBE: CPT

## 2019-09-17 PROCEDURE — 77030019605

## 2019-09-17 PROCEDURE — 36415 COLL VENOUS BLD VENIPUNCTURE: CPT

## 2019-09-17 PROCEDURE — 74011636637 HC RX REV CODE- 636/637: Performed by: FAMILY MEDICINE

## 2019-09-17 PROCEDURE — 83735 ASSAY OF MAGNESIUM: CPT

## 2019-09-17 PROCEDURE — 85025 COMPLETE CBC W/AUTO DIFF WBC: CPT

## 2019-09-17 PROCEDURE — 74011636637 HC RX REV CODE- 636/637: Performed by: INTERNAL MEDICINE

## 2019-09-17 PROCEDURE — 93970 EXTREMITY STUDY: CPT

## 2019-09-17 PROCEDURE — 85730 THROMBOPLASTIN TIME PARTIAL: CPT

## 2019-09-17 PROCEDURE — 82962 GLUCOSE BLOOD TEST: CPT

## 2019-09-17 PROCEDURE — 85610 PROTHROMBIN TIME: CPT

## 2019-09-17 PROCEDURE — 80053 COMPREHEN METABOLIC PANEL: CPT

## 2019-09-17 PROCEDURE — 65660000000 HC RM CCU STEPDOWN

## 2019-09-17 PROCEDURE — 77030020263 HC SOL INJ SOD CL0.9% LFCR 1000ML

## 2019-09-17 PROCEDURE — 93306 TTE W/DOPPLER COMPLETE: CPT

## 2019-09-17 PROCEDURE — 71046 X-RAY EXAM CHEST 2 VIEWS: CPT

## 2019-09-17 PROCEDURE — 74011250636 HC RX REV CODE- 250/636

## 2019-09-17 PROCEDURE — 83036 HEMOGLOBIN GLYCOSYLATED A1C: CPT

## 2019-09-17 PROCEDURE — 93880 EXTRACRANIAL BILAT STUDY: CPT

## 2019-09-17 RX ORDER — HEPARIN SODIUM 5000 [USP'U]/ML
35 INJECTION, SOLUTION INTRAVENOUS; SUBCUTANEOUS ONCE
Status: COMPLETED | OUTPATIENT
Start: 2019-09-17 | End: 2019-09-17

## 2019-09-17 RX ORDER — HEPARIN SODIUM 5000 [USP'U]/ML
INJECTION, SOLUTION INTRAVENOUS; SUBCUTANEOUS
Status: COMPLETED
Start: 2019-09-17 | End: 2019-09-17

## 2019-09-17 RX ORDER — DIPHENHYDRAMINE HCL 25 MG
25 CAPSULE ORAL
Status: DISCONTINUED | OUTPATIENT
Start: 2019-09-17 | End: 2019-09-24

## 2019-09-17 RX ORDER — MAGNESIUM SULFATE HEPTAHYDRATE 40 MG/ML
2 INJECTION, SOLUTION INTRAVENOUS ONCE
Status: COMPLETED | OUTPATIENT
Start: 2019-09-17 | End: 2019-09-17

## 2019-09-17 RX ORDER — INSULIN LISPRO 100 [IU]/ML
5 INJECTION, SOLUTION INTRAVENOUS; SUBCUTANEOUS
Status: DISCONTINUED | OUTPATIENT
Start: 2019-09-17 | End: 2019-09-18

## 2019-09-17 RX ORDER — INSULIN GLARGINE 100 [IU]/ML
32 INJECTION, SOLUTION SUBCUTANEOUS
Status: DISCONTINUED | OUTPATIENT
Start: 2019-09-17 | End: 2019-09-18

## 2019-09-17 RX ADMIN — HEPARIN SODIUM 2500 UNITS: 5000 INJECTION INTRAVENOUS; SUBCUTANEOUS at 01:36

## 2019-09-17 RX ADMIN — Medication 10 ML: at 21:34

## 2019-09-17 RX ADMIN — SODIUM CHLORIDE 75 ML/HR: 900 INJECTION, SOLUTION INTRAVENOUS at 01:37

## 2019-09-17 RX ADMIN — INSULIN LISPRO 4 UNITS: 100 INJECTION, SOLUTION INTRAVENOUS; SUBCUTANEOUS at 21:25

## 2019-09-17 RX ADMIN — Medication 10 ML: at 06:38

## 2019-09-17 RX ADMIN — INSULIN GLARGINE 32 UNITS: 100 INJECTION, SOLUTION SUBCUTANEOUS at 21:24

## 2019-09-17 RX ADMIN — MONTELUKAST SODIUM 10 MG: 10 TABLET, FILM COATED ORAL at 10:12

## 2019-09-17 RX ADMIN — MAGNESIUM SULFATE HEPTAHYDRATE 2 G: 40 INJECTION, SOLUTION INTRAVENOUS at 10:27

## 2019-09-17 RX ADMIN — AMLODIPINE BESYLATE 2.5 MG: 5 TABLET ORAL at 10:13

## 2019-09-17 RX ADMIN — INSULIN LISPRO 4 UNITS: 100 INJECTION, SOLUTION INTRAVENOUS; SUBCUTANEOUS at 10:12

## 2019-09-17 RX ADMIN — Medication 10 ML: at 14:47

## 2019-09-17 RX ADMIN — PANTOPRAZOLE SODIUM 40 MG: 40 TABLET, DELAYED RELEASE ORAL at 06:35

## 2019-09-17 RX ADMIN — INSULIN LISPRO 6 UNITS: 100 INJECTION, SOLUTION INTRAVENOUS; SUBCUTANEOUS at 17:43

## 2019-09-17 RX ADMIN — INSULIN LISPRO 5 UNITS: 100 INJECTION, SOLUTION INTRAVENOUS; SUBCUTANEOUS at 17:43

## 2019-09-17 RX ADMIN — PIOGLITAZONE 30 MG: 30 TABLET ORAL at 10:12

## 2019-09-17 RX ADMIN — INSULIN LISPRO 6 UNITS: 100 INJECTION, SOLUTION INTRAVENOUS; SUBCUTANEOUS at 12:56

## 2019-09-17 RX ADMIN — ATORVASTATIN CALCIUM 40 MG: 40 TABLET, FILM COATED ORAL at 10:12

## 2019-09-17 RX ADMIN — INSULIN LISPRO 5 UNITS: 100 INJECTION, SOLUTION INTRAVENOUS; SUBCUTANEOUS at 12:55

## 2019-09-17 RX ADMIN — SODIUM CHLORIDE 75 ML/HR: 900 INJECTION, SOLUTION INTRAVENOUS at 19:04

## 2019-09-17 RX ADMIN — METOPROLOL SUCCINATE 25 MG: 25 TABLET, EXTENDED RELEASE ORAL at 10:13

## 2019-09-17 RX ADMIN — HEPARIN SODIUM 2400 UNITS: 5000 INJECTION INTRAVENOUS; SUBCUTANEOUS at 11:45

## 2019-09-17 RX ADMIN — HEPARIN SODIUM 16 UNITS/KG/HR: 5000 INJECTION, SOLUTION INTRAVENOUS at 19:04

## 2019-09-17 NOTE — PROCEDURES
[facility]  CARDIAC CATH    Name:  Lilli Betancourt  MR#:  [id number]  :  1945  ACCOUNT #:  [de-identified]  DATE OF SERVICE:  2019      REFERRING PHYSICIAN:  Dr. Nixon Washburn    PREOPERATIVE DIAGNOSIS:  Angina pectoris. POSTOPERATIVE DIAGNOSIS:  Coronary artery disease. PROCEDURE PERFORMED:  Left heart cath with selective coronary angiography. SURGEON:  Marcelo Fuentes MD    ASSISTANT:  None. ANESTHESIA:  The patient received 2 mg of Versed and 50 mcg of fentanyl, was monitored for conscious sedation beginning at 09:36 and ending at 09:53 by nurse Herminia Peacock. COMPLICATIONS:  None. SPECIMENS REMOVED:  None. ESTIMATED BLOOD LOSS:  Less than 5 mL. IMPLANTS:  None. PROCEDURE:  After informed consent, the patient was prepped and draped in the usual sterile fashion. The right wrist was infiltrated with lidocaine. The right radial artery was accessed via the modified Seldinger technique with a 5-Somali sheath. A total of 40 mL of Isovue contrast were used for the entire procedure. A Terumo band was used for hemostasis. CATHETERS USED:  Included a 5-Somali JL-3.5 and 5-Somali JR-5. FINDINGS:  Left ventricle:  A left ventriculogram was deferred as the patient is to undergo an echocardiogram before coronary artery bypass grafting. LVEDP:  14 mmHg. Left main:  Normal.    Left anterior descending artery was subtotally occluded proximally, 99%. The circumflex artery bifurcated into two obtuse marginals in approximately the mid vessel. The obtuse marginal 1 had an 95% proximal stenosis and then the obtuse marginal 2 had a 90% stenosis. This was technically in the mid circumflex. The ongoing circumflex artery is very small, sub 2-mm AV groove vessel. The right coronary artery had a proximal 30% and a distal 40% stenosis and provided a right-to-left collateral flow to the distal LAD.     CONCLUSION:  This is a 42-year-old female with severe multivessel coronary artery disease including an occluded left anterior descending artery. We will get an echocardiogram today. She underwent a nuclear stress test that showed viability in the anterior wall. We will refer her for coronary artery bypass grafting. She had an ASPIRIN ALLERGY and has been taking Plavix so we will plan on admitting her to the inpatient service, stopping Plavix and placing her on a heparin drip until the time that she could be revascularized.       Homer Schuster MD      [id number]  D:  09/16/2019 10:14  T:  09/16/2019 10:17  JOB #:  [id number]

## 2019-09-17 NOTE — PROGRESS NOTES
Bedside shift report received from [facility].  Report included SBAR, kardex, I/O, recent results, MAR, and cardiac rhythm SB.

## 2019-09-17 NOTE — PROGRESS NOTES
Hospitalist Progress Note     Admit Date:  2019  7:09 AM   Name:  Linda Pablo   Age:  75 y.o.  :  1945   MRN:  [id number]   PCP:  Antoinette Gramajo MD  Treatment Team: Attending Provider: Hamlet Ramos MD; Consulting Provider: Queenie Juarez MD; Care Manager: Grier Brunner; Utilization Review: Yunior Marshall, RN; Consulting Provider: Daisy Martinez MD; Staff Nurse: Mae Watts RN; Primary Nurse: Catrachita Murrell RN    Subjective:   HPI and or CC:  Summary from prior medicine consult note:     Patient is a 67yo F with hx DM, HTN, CAD, and remote TIA admitted for cath, found to have severe multivessel disease and now pending CABG after plavix washout.     Hospitalist service consulted for uncontrolled DM     Last a1c 12.9 on  - PCP had her on invokana, metformin, glipizide, actos, and 24u tresbia qhs. Pt not sure about invokana or glipizide, but reports intermittent use of the others. A1c pending here. Cost and compliance are historical barriers to her management. She rarely checks blood sugar because it always says \"high\"    19:  Sugars better still not at goal. Discussed slow titratin basal and likely eventual fixed dose prandial and ss prandial for now. For CABG after plavix out of effective range-stopped . A1c is 11.9% estimated avg glucose of 295. Counseled re: sweetened sodas. DM diet.         Objective:     Patient Vitals for the past 24 hrs:   Temp Pulse Resp BP SpO2   19 1148 98 °F (36.7 °C) 60 18 119/58 96 %   19 0654 98.3 °F (36.8 °C) (!) 57 18 116/60 99 %   19 0313 97.5 °F (36.4 °C) (!) 58 18 126/58 95 %   19 2313 98.7 °F (37.1 °C) 71 18 120/61 96 %   19 1925 97.9 °F (36.6 °C) 69 18 148/67 94 %   19 1535 98.4 °F (36.9 °C) 64 18 117/56 96 %     Oxygen Therapy  O2 Sat (%): 96 % (19 1148)  Pulse via Oximetry: 61 beats per minute (19 1148)  O2 Device: Room air (19 0313)  O2 Flow Rate (L/min): 2 l/min (09/16/19 0950)    Intake/Output Summary (Last 24 hours) at 9/17/2019 1304  Last data filed at 9/17/2019 1257  Gross per 24 hour   Intake 1989.3 ml   Output 4800 ml   Net -2810.7 ml         REVIEW OF SYSTEMS: Comprehensive ROS performed and negative except as stated in HPI. Physical Examination:  General:    Well nourished. No gross distress. Head:  Normocephalic, atraumatic, nares patent  Eyes:  Extraocular movements intact, normal sclera  CV:   RRR. No  Murmurs, clicks, or gallops, distal pulses intact  Lungs:   Unlabored, no cyanosis, no wheeze  Abdomen:   Soft, nondistended, nontender. Extremities: Warm and dry. No cyanosis or edema. Skin:     No rashes or jaundice. Neuro:  No gross focal deficits, no tremor  Psych:  Mood and affect appropriate    Data Review:  I have reviewed all labs, meds, telemetry events, and studies from the last 24 hours.     Recent Results (from the past 24 hour(s))   GLUCOSE, POC    Collection Time: 09/16/19  5:02 PM   Result Value Ref Range    Glucose (POC) 338 (H) 65 - 100 mg/dL   URINALYSIS W/ RFLX MICROSCOPIC    Collection Time: 09/16/19  6:05 PM   Result Value Ref Range    Color YELLOW      Appearance CLEAR      Specific gravity 1.023 1.001 - 1.023      pH (UA) 7.0 5.0 - 9.0      Protein NEGATIVE  NEG mg/dL    Glucose >1,000 mg/dL    Ketone NEGATIVE  NEG mg/dL    Bilirubin NEGATIVE  NEG      Blood NEGATIVE  NEG      Urobilinogen 1.0 0.2 - 1.0 EU/dL    Nitrites NEGATIVE  NEG      Leukocyte Esterase NEGATIVE  NEG     GLUCOSE, POC    Collection Time: 09/16/19  9:07 PM   Result Value Ref Range    Glucose (POC) 343 (H) 65 - 100 mg/dL   PTT    Collection Time: 09/17/19 12:14 AM   Result Value Ref Range    aPTT 66.2 (H) 24.7 - 12.7 SEC   METABOLIC PANEL, COMPREHENSIVE    Collection Time: 09/17/19  5:11 AM   Result Value Ref Range    Sodium 142 136 - 145 mmol/L    Potassium 3.6 3.5 - 5.1 mmol/L    Chloride 107 98 - 107 mmol/L    CO2 29 21 - 32 mmol/L    Anion gap 6 (L) 7 - 16 mmol/L    Glucose 253 (H) 65 - 100 mg/dL    BUN 10 8 - 23 MG/DL    Creatinine 0.53 (L) 0.6 - 1.0 MG/DL    GFR est AA >60 >60 ml/min/1.73m2    GFR est non-AA >60 >60 ml/min/1.73m2    Calcium 8.7 8.3 - 10.4 MG/DL    Bilirubin, total 0.5 0.2 - 1.1 MG/DL    ALT (SGPT) 17 12 - 65 U/L    AST (SGOT) 14 (L) 15 - 37 U/L    Alk. phosphatase 103 50 - 136 U/L    Protein, total 6.2 (L) 6.3 - 8.2 g/dL    Albumin 2.9 (L) 3.2 - 4.6 g/dL    Globulin 3.3 2.3 - 3.5 g/dL    A-G Ratio 0.9 (L) 1.2 - 3.5     HEMOGLOBIN A1C WITH EAG    Collection Time: 09/17/19  5:11 AM   Result Value Ref Range    Hemoglobin A1c 11.9 (H) 4.8 - 6.0 %    Est. average glucose 295 mg/dL   MAGNESIUM    Collection Time: 09/17/19  5:11 AM   Result Value Ref Range    Magnesium 1.7 (L) 1.8 - 2.4 mg/dL   PROTHROMBIN TIME + INR    Collection Time: 09/17/19  5:11 AM   Result Value Ref Range    Prothrombin time 13.1 11.7 - 14.5 sec    INR 1.0     CBC WITH AUTOMATED DIFF    Collection Time: 09/17/19  5:11 AM   Result Value Ref Range    WBC 6.5 4.3 - 11.1 K/uL    RBC 4.06 4.05 - 5.2 M/uL    HGB 13.5 11.7 - 15.4 g/dL    HCT 40.9 35.8 - 46.3 %    .7 (H) 79.6 - 97.8 FL    MCH 33.3 (H) 26.1 - 32.9 PG    MCHC 33.0 31.4 - 35.0 g/dL    RDW 12.3 11.9 - 14.6 %    PLATELET 566 374 - 622 K/uL    MPV 10.9 9.4 - 12.3 FL    ABSOLUTE NRBC 0.00 0.0 - 0.2 K/uL    DF AUTOMATED      NEUTROPHILS 56 43 - 78 %    LYMPHOCYTES 32 13 - 44 %    MONOCYTES 10 4.0 - 12.0 %    EOSINOPHILS 2 0.5 - 7.8 %    BASOPHILS 1 0.0 - 2.0 %    IMMATURE GRANULOCYTES 0 0.0 - 5.0 %    ABS. NEUTROPHILS 3.6 1.7 - 8.2 K/UL    ABS. LYMPHOCYTES 2.1 0.5 - 4.6 K/UL    ABS. MONOCYTES 0.6 0.1 - 1.3 K/UL    ABS. EOSINOPHILS 0.1 0.0 - 0.8 K/UL    ABS. BASOPHILS 0.0 0.0 - 0.2 K/UL    ABS. IMM.  GRANS. 0.0 0.0 - 0.5 K/UL   GLUCOSE, POC    Collection Time: 09/17/19  6:37 AM   Result Value Ref Range    Glucose (POC) 236 (H) 65 - 100 mg/dL   GLUCOSE, POC    Collection Time: 09/17/19  9:46 AM   Result Value Ref Range Glucose (POC) 229 (H) 65 - 100 mg/dL   PTT    Collection Time: 09/17/19 10:02 AM   Result Value Ref Range    aPTT 63.5 (H) 24.7 - 39.8 SEC        All Micro Results     None          Current Meds:  Current Facility-Administered Medications   Medication Dose Route Frequency    insulin glargine (LANTUS) injection 32 Units  32 Units SubCUTAneous QHS    insulin lispro (HUMALOG) injection 5 Units  5 Units SubCUTAneous TIDAC    diphenhydrAMINE (BENADRYL) capsule 25 mg  25 mg Oral Q6H PRN    0.9% sodium chloride infusion  75 mL/hr IntraVENous CONTINUOUS    saline peripheral flush soln 5 mL  5 mL InterCATHeter PRN    0.9% sodium chloride infusion  75 mL/hr IntraVENous CONTINUOUS    sodium chloride (NS) flush 5-40 mL  5-40 mL IntraVENous Q8H    sodium chloride (NS) flush 5-40 mL  5-40 mL IntraVENous PRN    acetaminophen (TYLENOL) tablet 650 mg  650 mg Oral Q4H PRN    HYDROcodone-acetaminophen (NORCO) 5-325 mg per tablet 1 Tab  1 Tab Oral Q4H PRN    morphine injection 1 mg  1 mg IntraVENous Q4H PRN    naloxone (NARCAN) injection 0.4 mg  0.4 mg IntraVENous PRN    amLODIPine (NORVASC) tablet 2.5 mg  2.5 mg Oral DAILY    atorvastatin (LIPITOR) tablet 40 mg  40 mg Oral DAILY    cyclobenzaprine (FLEXERIL) tablet 10 mg  10 mg Oral TID PRN    metoprolol succinate (TOPROL-XL) XL tablet 25 mg  25 mg Oral DAILY    montelukast (SINGULAIR) tablet 10 mg  10 mg Oral DAILY    pantoprazole (PROTONIX) tablet 40 mg  40 mg Oral ACB    pioglitazone (ACTOS) tablet 30 mg  30 mg Oral DAILY    heparin 25,000 units in dextrose 500 mL infusion  12-25 Units/kg/hr IntraVENous TITRATE    influenza vaccine 2019-20 (6 mos+)(PF) (FLUARIX/FLULAVAL/FLUZONE QUAD) injection 0.5 mL  0.5 mL IntraMUSCular PRIOR TO DISCHARGE    insulin lispro (HUMALOG) injection   SubCUTAneous AC&HS       Diet:  DIET NUTRITIONAL SUPPLEMENTS  DIET DIABETIC WITH OPTIONS    Other Studies (last 24 hours):  Xr Chest Pa Lat    Result Date: 9/17/2019  History: Preop for CABG Comments: PA and lateral views of the chest are provided. No comparisons are available. The lungs demonstrate no evidence of effusions and/or infiltrates. There is hyperexpansion of the lungs consistent with COPD changes. The heart and mediastinal contours unremarkable. Degenerative changes are noted within the spine as well as the shoulders bilaterally right greater than left. IMPRESSION: 1. Findings consistent with COPD. Duplex Carotid Bilateral    Result Date: 9/17/2019  TITLE:  Carotid and Vertebral Ultrasound Examination. INDICATION:  Coronary artery disease. Preoperative coronary artery bypass graft surgery planning. TECHNIQUE: Grayscale, Color Doppler, and Spectral Doppler Ultrasound interrogation performed. Peak Systolic Velocity, ICA-to-CCA ratio, and End-Diastolic Velocity are recorded. The estimate of stenosis is inferred from velocity measurements cross referenced to published correlations as defined by the Society of Radiologists in [address] [place] [address] Drive Radiology 2003; 229; 340-346. COMPARISON: None. RIGHT NECK:  The peak systolic velocity in the Common Carotid Artery = 56 cm/sec; Internal Carotid Artery = 85 cm/sec. Ratio = 1.5. Small, relatively sessile, echogenic plaque in the carotid bulb. Anterograde flow in the vertebral artery. LEFT  NECK:  The peak systolic velocity in the Common Carotid Artery = 73 cm/sec; Internal Carotid Artery = 95 cm/sec. Ratio = 1.3. [place], echogenic and shadowing, plaque is in the carotid bulb. Anterograde flow in the vertebral artery. IMPRESSION:  DESTINY:  No hemodynamically significant stenosis. LICA:  No hemodynamically significant stenosis. Duplex Lower Ext Vein Map Bilat    Result Date: 9/17/2019  TITLE: Upper, Lower extremity vein mapping ultrasound examination INDICATION: End-stage renal disease, surgical hemodialysis access planning, Peripheral arterial disease, surgical revascularization planning.  TECHNIQUE: Grayscale, Color, and Spectral Doppler interrogation performed. COMPARISON: None. FINDINGS: Vein diameters are recorded on the PACs system. Right lower extremity: Great saphenous vein measures 8 mm in the proximal thigh, 4 mm in the midthigh, 4 mm in the distal thigh, 4 mm in the proximal calf. Note is made of small varicose veins measuring up to 3 mm in the right calf. Noncompressible superficial varicose veins are noted in the right calf. The right greater saphenous vein in the mid calf measures 2 mm. The right greater saphenous vein and distal calf measures 1.4 mm. Small saphenous vein measures 3 mm in the proximal calf, 3 mm in the mid-calf, 2 mm at the ankle. Left lower extremity: Great saphenous vein measures 5 mm in the proximal thigh, 2 mm in the midthigh, 2 mm in the distal thigh, 2 mm in the proximal calf, 2 mm mid-calf, 2 mm at the ankle. Small saphenous vein measures 4 mm in the proximal calf, 3 mm in the mid-calf, 2 mm at the ankle. The left small saphenous vein does not compress and as distal aspect. IMPRESSION: 1. Measurements as above. 2.  The right greater saphenous vein appears patent; however, adjacent thrombosed varicose veins are present in the mid calf. 3.  The left small saphenous vein is thrombosed.       Assessment and Plan:     Hospital Problems as of 9/17/2019 Date Reviewed: 9/11/2019          Codes Class Noted - Resolved POA    CAD (coronary artery disease) ICD-10-CM: I25.10  ICD-9-CM: 414.00  9/16/2019 - Present Yes        DM2 (diabetes mellitus, type 2) ([facility] ICD-10-CM: E11.9  ICD-9-CM: 250.00  6/21/2012 - Present Yes    Overview Signed 9/20/2016  9:58 AM by Lesa Morales     followed by PCP               HLD (hyperlipidemia) ICD-10-CM: E78.5  ICD-9-CM: 272.4  6/21/2012 - Present Yes    Overview Signed 9/20/2016  9:59 AM by Lesa Morales     followed by PCP               HTN (hypertension) ICD-10-CM: I10  ICD-9-CM: 401.9  6/21/2012 - Present Yes    Overview Signed 9/20/2016  9:58 AM by Jozef Rhymes     readings in target range                   A/P:    [facility] dm req. Insulin    Titrate lantus   Add fixed dose prandial novolog and continue ss novolog equiv. Goal slowly  Titrate <180 fasting short term and more aggressive long term   Expect surgical stressor may effect needs post op and will need lower dose nocturnal basal when npo for surgical intervention. DM education appreciated. Signed:  Justice FOOTE

## 2019-09-17 NOTE — DIABETES MGMT
Patient s/p heart cath and patient is on Plavix washout for CABG. Patient voices a positive family history of diabetes and states she was diagnosed about nine years ago. Patient recently completed diabetes classes with [facility] [facility]. Patient reports she found the classes beneficial. \" I found out what to do if my sugar got too low. \" Asked patient if she experiences hypoglycemia. \" Shivani December passed out at the house last Saturday. I felt bad like nauseated and I tried to eat some peanut butter, but then I was dry heaving so I couldn't eat it and I passed out. \" Patient reports she did not check her blood glucose. \" I don't have a meter that works. ..they call keep saying \"High\" for some reason\". Note current A1c 11.9 (eA). A1c has ranged 6.8 in -12.9 in May 2019. Patient voices some noncompliance with prescribed regimens over the years due to cost. Patient is managed by her PCP, Dr. Hope Zapata. Although patient did attend diabetic classes she still has poor insight to her disease. Patient states she was taking Tresiba 26 units QHS (which costs her $99), metformin 500 mg BID, and actos 30 mg daily. Patient is awaiting echo. If EF is not good, primary RN to notify provider and get actos discontinued. Spoke with provider and prandial insulin to get initiated with lunch today. Current regimen: Lantus 32 units QHS, Humalog 5 units with meals, Humalog SSI, and Actos 30 mg daily. Will provide patient with a glucometer and follow along to provide education as needed.

## 2019-09-17 NOTE — PROGRESS NOTES
[facility] CARDIOLOGY PROGRESS NOTE           9/17/2019 12:40 PM    Admit Date: 9/16/2019         Subjective: Awaiting CABG, on heparin, denies CP. C/O nunny nose. ROS:  Cardiovascular:  As noted above    Objective:      Vitals:    09/16/19 2313 09/17/19 0313 09/17/19 0654 09/17/19 1148   BP: 120/61 126/58 116/60 119/58   Pulse: 71 (!) 58 (!) 57 60   Resp: 18 18 18 18   Temp: 98.7 °F (37.1 °C) 97.5 °F (36.4 °C) 98.3 °F (36.8 °C) 98 °F (36.7 °C)   SpO2: 96% 95% 99% 96%   Weight:  68.4 kg (150 lb 12.8 oz)     Height:             Physical Exam:  General: Well Developed, Well Nourished, No Acute Distress, Alert & Oriented x 3, Appropriate mood  Neck: supple, no JVD  Heart: S1S2 with RRR without murmurs or gallops  Lungs: Clear throughout auscultation bilaterally without adventitious sounds  Abd: soft, nontender, nondistended, with good bowel sounds  Ext: no edema bilaterally  Skin: warm and dry      Data Review:   Recent Labs     09/17/19  0511 09/16/19  1227     --    K 3.6  --    MG 1.7*  --    BUN 10  --    CREA 0.53*  --    *  --    WBC 6.5 7.2   HGB 13.5 13.7   HCT 40.9 41.4    197   INR 1.0  --        No results for input(s): TNIPOC, TROIQ in the last 72 hours. Assessment/Plan:     Active Problems:    CAD (coronary artery disease) (9/16/2019)      DM2 (diabetes mellitus, type 2) ([facility] (6/21/2012)      Overview: followed by PCP            HLD (hyperlipidemia) (6/21/2012)      Overview: followed by PCP            HTN (hypertension) (6/21/2012)      Overview: readings in target range    A/P  1) CAD - hep gtt, surgery after plavix was out, continue statin therapy. Off ASA with allergy, will restart plavix after surgery  2) LVEF - preserved  3) HTN - controlled  4) allergic rhinitis - take benadryl at home regularly.       Doris Mccall MD  9/17/2019 12:40 PM

## 2019-09-17 NOTE — PROGRESS NOTES
Care Management Interventions  PCP Verified by CM: Yes(Kinjal Girard)  Mode of Transport at Discharge: Other (see comment)(TBD based on need)  Transition of Care Consult (CM Consult): Discharge Planning  Discharge Durable Medical Equipment: No  Physical Therapy Consult: No  Occupational Therapy Consult: No  Speech Therapy Consult: No  Current Support Network: Own Home, Other(Pt lives with her daughter)  Confirm Follow Up Transport: Other (see comment)(TBD based on need)  Plan discussed with Pt/Family/Caregiver: Yes  Freedom of Choice Offered: Yes  Discharge Location  Discharge Placement: Unable to determine at this time      This CM met with pt and daughter at bedside this day. Pt verified her PCP, insurance, emergency contact, and home address. Pt reports difficulty at times getting all medications - requesting information on Medicaid; this CM made referral to [facility]. Pt lives at home with daughter and daughters family with ramped entrance. Her home DME includes a cane and walker. She confirms that at baseline she is independent with her ADLs including bathing, dressing, cooking, and driving. Pt with plans to remain within the hospital until plavix washout complete and then is scheduled for tentative CABG. No additional discharge needs at this time. CM to continue to follow.

## 2019-09-17 NOTE — PROGRESS NOTES
Problem: Falls - Risk of  Goal: *Absence of Falls  Description  Document Tammie Dougherty Fall Risk and appropriate interventions in the flowsheet.   Outcome: Progressing Towards Goal  Note:   Fall Risk Interventions:  Mobility Interventions: Patient to call before getting OOB    Mentation Interventions: Adequate sleep, hydration, pain control    Medication Interventions: Patient to call before getting OOB, Teach patient to arise slowly    Elimination Interventions: Call light in reach, Patient to call for help with toileting needs, Stay With Me (per policy)    History of Falls Interventions: Door open when patient unattended

## 2019-09-17 NOTE — PROGRESS NOTES
Problem: Falls - Risk of  Goal: *Absence of Falls  Description  Document Imer Van Fall Risk and appropriate interventions in the flowsheet.   Outcome: Progressing Towards Goal  Note:   Fall Risk Interventions:  Mobility Interventions: Patient to call before getting OOB    Medication Interventions: Patient to call before getting OOB, Teach patient to arise slowly    Elimination Interventions: Call light in reach, Patient to call for help with toileting needs, Stay With Me (per policy)    History of Falls Interventions: Door open when patient unattended

## 2019-09-17 NOTE — PROGRESS NOTES
Bedside shift change report given to Yomi (oncoming nurse) by Joel perez (offgoing nurse). Report included the following information SBAR, Kardex, Intake/Output, MAR and Recent Results, Cardiac Rhythm NSR/SB.

## 2019-09-17 NOTE — PROGRESS NOTES
Nutrition  Reason for assessment: Referral received from nursing admission Malnutrition Screening Tool   Recently Lost Weight Without Trying: Yes  If Yes, How Much Weight Loss: 14 - 23 lbs  Eating Poorly Due to Decreased Appetite: No  Assessment:   Diet: DIET NUTRITIONAL SUPPLEMENTS All Meals; Glucerna Shake  DIET DIABETIC WITH OPTIONS Consistent Carb 1800kcal; Regular; 2 GM NA (House Low NA); AHA-LOW-CHOL FAT    Food/Nutrition Patient History: S/P LHC yesterday with plans for a CABG (Monday, per pt). H/O CAD, COPD, and DM. Pt has received DM diet counseling from Shawn Bob RD/DENEEN in out-patient setting. She states that she has learned to focus more on portion control of CHO and getting enough protein. Pt states that she used to be a Nela eater\", stating \"I used to sit down in front of the t.v. And eat a whole bag of chips. \"  Pt states that she had a weight of 218 pounds 30-40 years ago and had been 180 pounds up until recently. Pt states that she believes that overall diet changes and portion control has assisted with weight loss. Pt seen drinking regular gingerale and states that her daughter brought it yesterday and she has been sipping on it throughout the day. She does not have any further nutrition related questions/concerns. A1C 11.9 today (down from 12.9 on 5/20/19). Anthropometrics:Height: 5' 2\" (157.5 cm),  Weight: 68.4 kg (150 lb 12.8 oz), Weight Source: Bed, Body mass index is 27.58 kg/m². BMI class of normal weight. WT / BMI 9/17/2019 9/11/2019 8/16/2019 7/22/2019 5/29/2019   WEIGHT 150 lb 12.8 oz 151 lb 4 oz 155 lb 152 lb 156 lb     WT / BMI 5/20/2019 5/1/2019 3/13/2019 12/25/2018 11/14/2018   WEIGHT 154 lb 160 lb 161 lb 156 lb 156 lb   Per weight listed in EMR, potential for a 10 pound, 6.3% weight loss within 1 year.    Macronutrient needs:(68.4 kg)  EER:  2049-2200 kcal /day (20-25 kcal/kg listed BW)  EPR:  68-82 grams protein/day (1-1.2 grams/kg listed BW)  Intake/Comparative Standards: Per RD meal rounds: 90% of lunch (left bread and dessert mostly untouched) + 100% of glucerna shake. Average intake for past 1 day(s)/1 recorded meal(s): 100%. Nutrition Diagnosis: Excessive CHO intake r/t prior deficit of [facility] diet education as evidenced by pt with A1C of 11.9 and report of eating excessive amounts of CHO prior to making diet changes to assist with blood sugar control and weight loss. Intervention:  Meals and snacks: Continue current diet. Offered option to refuse bread or dessert on tray if desired. Nutrition Supplement Therapy: continue glucerna shake TID   Discharge nutrition plan: Too soon to determine.     Dorie Jewell [address], [address], [address] [place] [address], [address] [place] [address] Ave.

## 2019-09-17 NOTE — PROGRESS NOTES
Bedside shift change report given to Shalini Mccullough (oncoming nurse) by Salvador Cuevas RN (offgoing nurse). Report included the following information SBAR, Kardex, Intake/Output, MAR, Recent Results and Cardiac Rhythm NSR.

## 2019-09-18 LAB
APTT PPP: 98 SEC (ref 24.7–39.8)
BASOPHILS # BLD: 0 K/UL (ref 0–0.2)
BASOPHILS NFR BLD: 1 % (ref 0–2)
DIFFERENTIAL METHOD BLD: ABNORMAL
EOSINOPHIL # BLD: 0.1 K/UL (ref 0–0.8)
EOSINOPHIL NFR BLD: 2 % (ref 0.5–7.8)
ERYTHROCYTE [DISTWIDTH] IN BLOOD BY AUTOMATED COUNT: 12.5 % (ref 11.9–14.6)
GLUCOSE BLD STRIP.AUTO-MCNC: 185 MG/DL (ref 65–100)
GLUCOSE BLD STRIP.AUTO-MCNC: 203 MG/DL (ref 65–100)
GLUCOSE BLD STRIP.AUTO-MCNC: 225 MG/DL (ref 65–100)
GLUCOSE BLD STRIP.AUTO-MCNC: 272 MG/DL (ref 65–100)
HCT VFR BLD AUTO: 38.5 % (ref 35.8–46.3)
HGB BLD-MCNC: 12.8 G/DL (ref 11.7–15.4)
IMM GRANULOCYTES # BLD AUTO: 0 K/UL (ref 0–0.5)
IMM GRANULOCYTES NFR BLD AUTO: 0 % (ref 0–5)
LYMPHOCYTES # BLD: 2.5 K/UL (ref 0.5–4.6)
LYMPHOCYTES NFR BLD: 36 % (ref 13–44)
MCH RBC QN AUTO: 33.8 PG (ref 26.1–32.9)
MCHC RBC AUTO-ENTMCNC: 33.2 G/DL (ref 31.4–35)
MCV RBC AUTO: 101.6 FL (ref 79.6–97.8)
MONOCYTES # BLD: 0.8 K/UL (ref 0.1–1.3)
MONOCYTES NFR BLD: 11 % (ref 4–12)
NEUTS SEG # BLD: 3.4 K/UL (ref 1.7–8.2)
NEUTS SEG NFR BLD: 50 % (ref 43–78)
NRBC # BLD: 0 K/UL (ref 0–0.2)
PLATELET # BLD AUTO: 198 K/UL (ref 150–450)
PMV BLD AUTO: 11.1 FL (ref 9.4–12.3)
RBC # BLD AUTO: 3.79 M/UL (ref 4.05–5.2)
WBC # BLD AUTO: 6.9 K/UL (ref 4.3–11.1)

## 2019-09-18 PROCEDURE — 85025 COMPLETE CBC W/AUTO DIFF WBC: CPT

## 2019-09-18 PROCEDURE — 74011250636 HC RX REV CODE- 250/636: Performed by: INTERNAL MEDICINE

## 2019-09-18 PROCEDURE — 74011636637 HC RX REV CODE- 636/637: Performed by: INTERNAL MEDICINE

## 2019-09-18 PROCEDURE — 82962 GLUCOSE BLOOD TEST: CPT

## 2019-09-18 PROCEDURE — 85730 THROMBOPLASTIN TIME PARTIAL: CPT

## 2019-09-18 PROCEDURE — 65660000000 HC RM CCU STEPDOWN

## 2019-09-18 PROCEDURE — 74011250637 HC RX REV CODE- 250/637: Performed by: INTERNAL MEDICINE

## 2019-09-18 PROCEDURE — 74011636637 HC RX REV CODE- 636/637: Performed by: FAMILY MEDICINE

## 2019-09-18 PROCEDURE — 36415 COLL VENOUS BLD VENIPUNCTURE: CPT

## 2019-09-18 RX ORDER — INSULIN LISPRO 100 [IU]/ML
8 INJECTION, SOLUTION INTRAVENOUS; SUBCUTANEOUS
Status: DISCONTINUED | OUTPATIENT
Start: 2019-09-18 | End: 2019-09-19

## 2019-09-18 RX ORDER — INSULIN GLARGINE 100 [IU]/ML
40 INJECTION, SOLUTION SUBCUTANEOUS
Status: DISCONTINUED | OUTPATIENT
Start: 2019-09-18 | End: 2019-09-20

## 2019-09-18 RX ORDER — INSULIN LISPRO 100 [IU]/ML
8 INJECTION, SOLUTION INTRAVENOUS; SUBCUTANEOUS
Status: DISCONTINUED | OUTPATIENT
Start: 2019-09-18 | End: 2019-09-18

## 2019-09-18 RX ORDER — INSULIN GLARGINE 100 [IU]/ML
36 INJECTION, SOLUTION SUBCUTANEOUS
Status: DISCONTINUED | OUTPATIENT
Start: 2019-09-18 | End: 2019-09-18

## 2019-09-18 RX ADMIN — INSULIN GLARGINE 40 UNITS: 100 INJECTION, SOLUTION SUBCUTANEOUS at 21:17

## 2019-09-18 RX ADMIN — INSULIN LISPRO 4 UNITS: 100 INJECTION, SOLUTION INTRAVENOUS; SUBCUTANEOUS at 08:26

## 2019-09-18 RX ADMIN — INSULIN LISPRO 4 UNITS: 100 INJECTION, SOLUTION INTRAVENOUS; SUBCUTANEOUS at 17:20

## 2019-09-18 RX ADMIN — INSULIN LISPRO 8 UNITS: 100 INJECTION, SOLUTION INTRAVENOUS; SUBCUTANEOUS at 12:14

## 2019-09-18 RX ADMIN — HEPARIN SODIUM 16 UNITS/KG/HR: 5000 INJECTION, SOLUTION INTRAVENOUS at 18:57

## 2019-09-18 RX ADMIN — INSULIN LISPRO 8 UNITS: 100 INJECTION, SOLUTION INTRAVENOUS; SUBCUTANEOUS at 17:20

## 2019-09-18 RX ADMIN — ATORVASTATIN CALCIUM 40 MG: 40 TABLET, FILM COATED ORAL at 10:29

## 2019-09-18 RX ADMIN — ACETAMINOPHEN 650 MG: 325 TABLET, FILM COATED ORAL at 10:36

## 2019-09-18 RX ADMIN — AMLODIPINE BESYLATE 2.5 MG: 5 TABLET ORAL at 10:29

## 2019-09-18 RX ADMIN — INSULIN LISPRO 6 UNITS: 100 INJECTION, SOLUTION INTRAVENOUS; SUBCUTANEOUS at 12:12

## 2019-09-18 RX ADMIN — MONTELUKAST SODIUM 10 MG: 10 TABLET, FILM COATED ORAL at 10:29

## 2019-09-18 RX ADMIN — DIPHENHYDRAMINE HYDROCHLORIDE 25 MG: 25 CAPSULE ORAL at 00:11

## 2019-09-18 RX ADMIN — Medication 10 ML: at 06:03

## 2019-09-18 RX ADMIN — METOPROLOL SUCCINATE 25 MG: 25 TABLET, EXTENDED RELEASE ORAL at 10:30

## 2019-09-18 RX ADMIN — PIOGLITAZONE 30 MG: 30 TABLET ORAL at 10:29

## 2019-09-18 RX ADMIN — Medication 10 ML: at 21:22

## 2019-09-18 RX ADMIN — DIPHENHYDRAMINE HYDROCHLORIDE 25 MG: 25 CAPSULE ORAL at 22:29

## 2019-09-18 RX ADMIN — INSULIN LISPRO 8 UNITS: 100 INJECTION, SOLUTION INTRAVENOUS; SUBCUTANEOUS at 08:25

## 2019-09-18 RX ADMIN — Medication 10 ML: at 17:22

## 2019-09-18 RX ADMIN — INSULIN LISPRO 2 UNITS: 100 INJECTION, SOLUTION INTRAVENOUS; SUBCUTANEOUS at 21:17

## 2019-09-18 NOTE — PROGRESS NOTES
Patient and family member viewed pre-op video. Opportunity for questions provided. Verbalizes understanding.

## 2019-09-18 NOTE — PROGRESS NOTES
Cardiac Rehab. Chart review completed. Pt for scheduled CABG surgery next week. I reviewed the goals and purpose of Cardiac Rehab for the postoperative CABG pt. Pt states she will want to participate in Cardiac Rehab when appropriate. She will discuss with family to determine which facility. She is aware that I will follow up with her after surgery also.

## 2019-09-18 NOTE — PROGRESS NOTES
Problem: Falls - Risk of  Goal: *Absence of Falls  Description  Document Kandimai Hidalgoe Fall Risk and appropriate interventions in the flowsheet.   Outcome: Progressing Towards Goal  Note:   Fall Risk Interventions:  Mobility Interventions: Patient to call before getting OOB    Mentation Interventions: Adequate sleep, hydration, pain control    Medication Interventions: Patient to call before getting OOB, Teach patient to arise slowly    Elimination Interventions: Call light in reach, Stay With Me (per policy), Patient to call for help with toileting needs    History of Falls Interventions: Door open when patient unattended

## 2019-09-18 NOTE — PROGRESS NOTES
Bedside shift change report given to Polly Paget (oncoming nurse) by Allie Dawn (offgoing nurse). Report included the following information SBAR, Kardex, Intake/Output, MAR, Recent Results and Cardiac Rhythm NSR.

## 2019-09-18 NOTE — PROGRESS NOTES
CTS-pt denies any chest pain. Echo and carotid US results reviewed. CABG surgery planned for Monday after Plavix washout. STS mortality risk for CABG surgery is 1.278%. Appreciate hospitalists assistance with uncontrolled DM.      Courtney Favre, PA-C

## 2019-09-18 NOTE — PROGRESS NOTES
Hospitalist Progress Note     Admit Date:  2019  7:09 AM   Name:  Eugene Resendiz   Age:  75 y.o.  :  1945   MRN:  [id number]   PCP:  Rhonda Slater MD  Treatment Team: Attending Provider: Anthony Dorman MD; Consulting Provider: Rock Landers MD; Care Manager: Marianne Reynoso; Utilization Review: Jose Ricks RN; Consulting Provider: Alli Reddy MD    Subjective:   HPI and or CC:  Summary from prior medicine consult note:     Patient is a 67yo F with hx DM, HTN, CAD, and remote TIA admitted for cath, found to have severe multivessel disease and now pending CABG after plavix washout.     Hospitalist service consulted for uncontrolled DM     Last a1c 12.9 on  - PCP had her on invokana, metformin, glipizide, actos, and 24u tresbia qhs. Pt not sure about invokana or glipizide, but reports intermittent use of the others. A1c pending here. Cost and compliance are historical barriers to her management. She rarely checks blood sugar because it always says \"high\"    19:  fsbs 203 this am but 270's most recent. Had sweet roll on breakfast tray not listed on her menu and I think was an error. She is on dm diet. For 40u lantus tonight and inc.fixed prandial to 8u and continue ss insulin. She is aware she will need insulin long term. For probable cabg early next week.         Objective:     Patient Vitals for the past 24 hrs:   Temp Pulse Resp BP SpO2   19 1158 97.8 °F (36.6 °C) (!) 57 16 108/57 100 %   19 1029  75      19 0731 97.1 °F (36.2 °C) (!) 58 20 119/59 99 %   19 0305 97.6 °F (36.4 °C) 60 18 110/53 95 %   19 2222 98.6 °F (37 °C) 66 18 154/67 99 %   19 1911 98.4 °F (36.9 °C) 69 18 123/58 98 %   19 1509 97.6 °F (36.4 °C) 67 18 129/58 97 %     Oxygen Therapy  O2 Sat (%): 100 % (19 1158)  Pulse via Oximetry: 66 beats per minute (19 2222)  O2 Device: Room air (19 1158)  O2 Flow Rate (L/min): 2 l/min (09/16/19 0950)    Intake/Output Summary (Last 24 hours) at 9/18/2019 1302  Last data filed at 9/18/2019 1200  Gross per 24 hour   Intake 2947.03 ml   Output 2930 ml   Net 17.03 ml         REVIEW OF SYSTEMS: Comprehensive ROS performed and negative except as stated in HPI. Physical Examination:  Physical Exam   Constitutional: No distress. HENT:   Right Ear: External ear normal.   Left Ear: External ear normal.   Nose: Nose normal.   Eyes: EOM are normal. No scleral icterus. Neck: Neck supple. No tracheal deviation present. No thyromegaly present. Cardiovascular: Normal rate and normal heart sounds. Exam reveals no gallop. Pulmonary/Chest: No respiratory distress. She has no wheezes. Abdominal: Bowel sounds are normal. There is no tenderness. There is no rebound. Musculoskeletal: She exhibits no tenderness or deformity. Neurological: She is alert. GCS score is 15. Skin: Skin is dry. No rash noted. She is not diaphoretic. Psychiatric: Memory and affect normal.       Data Review:  I have reviewed all labs, meds, telemetry events, and studies from the last 24 hours. Recent Results (from the past 24 hour(s))   PTT    Collection Time: 09/17/19  5:09 PM   Result Value Ref Range    aPTT 101.8 (H) 24.7 - 39.8 SEC   GLUCOSE, POC    Collection Time: 09/17/19  5:16 PM   Result Value Ref Range    Glucose (POC) 269 (H) 65 - 100 mg/dL   MSSA/MRSA SC BY PCR, NASAL SWAB    Collection Time: 09/17/19  9:15 PM   Result Value Ref Range    Special Requests: NO SPECIAL REQUESTS      Culture result:        SA target not detected. A MRSA NEGATIVE, SA NEGATIVE test result does not preclude MRSA or SA nasal colonization.    GLUCOSE, POC    Collection Time: 09/17/19  9:16 PM   Result Value Ref Range    Glucose (POC) 249 (H) 65 - 100 mg/dL   PTT    Collection Time: 09/17/19 11:13 PM   Result Value Ref Range    aPTT 105.7 (H) 24.7 - 39.8 SEC   CBC WITH AUTOMATED DIFF    Collection Time: 09/18/19  4:52 AM   Result Value Ref Range    WBC 6.9 4.3 - 11.1 K/uL    RBC 3.79 (L) 4.05 - 5.2 M/uL    HGB 12.8 11.7 - 15.4 g/dL    HCT 38.5 35.8 - 46.3 %    .6 (H) 79.6 - 97.8 FL    MCH 33.8 (H) 26.1 - 32.9 PG    MCHC 33.2 31.4 - 35.0 g/dL    RDW 12.5 11.9 - 14.6 %    PLATELET 099 584 - 995 K/uL    MPV 11.1 9.4 - 12.3 FL    ABSOLUTE NRBC 0.00 0.0 - 0.2 K/uL    DF AUTOMATED      NEUTROPHILS 50 43 - 78 %    LYMPHOCYTES 36 13 - 44 %    MONOCYTES 11 4.0 - 12.0 %    EOSINOPHILS 2 0.5 - 7.8 %    BASOPHILS 1 0.0 - 2.0 %    IMMATURE GRANULOCYTES 0 0.0 - 5.0 %    ABS. NEUTROPHILS 3.4 1.7 - 8.2 K/UL    ABS. LYMPHOCYTES 2.5 0.5 - 4.6 K/UL    ABS. MONOCYTES 0.8 0.1 - 1.3 K/UL    ABS. EOSINOPHILS 0.1 0.0 - 0.8 K/UL    ABS. BASOPHILS 0.0 0.0 - 0.2 K/UL    ABS. IMM. GRANS. 0.0 0.0 - 0.5 K/UL   GLUCOSE, POC    Collection Time: 09/18/19  6:00 AM   Result Value Ref Range    Glucose (POC) 203 (H) 65 - 100 mg/dL   GLUCOSE, POC    Collection Time: 09/18/19 11:59 AM   Result Value Ref Range    Glucose (POC) 272 (H) 65 - 100 mg/dL        All Micro Results     Procedure Component Value Units Date/Time    MSSA/MRSA SC BY PCR, NASAL SWAB [[id number]] Collected:  09/17/19 9253    Order Status:  Completed Specimen:  Nasal swab Updated:  09/17/19 6875     Special Requests: NO SPECIAL REQUESTS        Culture result:       SA target not detected. A MRSA NEGATIVE, SA NEGATIVE test result does not preclude MRSA or SA nasal colonization.                 Current Meds:  Current Facility-Administered Medications   Medication Dose Route Frequency    insulin lispro (HUMALOG) injection 8 Units  8 Units SubCUTAneous TIDAC    insulin glargine (LANTUS) injection 40 Units  40 Units SubCUTAneous QHS    diphenhydrAMINE (BENADRYL) capsule 25 mg  25 mg Oral Q6H PRN    saline peripheral flush soln 5 mL  5 mL InterCATHeter PRN    sodium chloride (NS) flush 5-40 mL  5-40 mL IntraVENous Q8H    sodium chloride (NS) flush 5-40 mL  5-40 mL IntraVENous PRN    acetaminophen (TYLENOL) tablet 650 mg  650 mg Oral Q4H PRN    HYDROcodone-acetaminophen (NORCO) 5-325 mg per tablet 1 Tab  1 Tab Oral Q4H PRN    morphine injection 1 mg  1 mg IntraVENous Q4H PRN    naloxone (NARCAN) injection 0.4 mg  0.4 mg IntraVENous PRN    amLODIPine (NORVASC) tablet 2.5 mg  2.5 mg Oral DAILY    atorvastatin (LIPITOR) tablet 40 mg  40 mg Oral DAILY    cyclobenzaprine (FLEXERIL) tablet 10 mg  10 mg Oral TID PRN    metoprolol succinate (TOPROL-XL) XL tablet 25 mg  25 mg Oral DAILY    montelukast (SINGULAIR) tablet 10 mg  10 mg Oral DAILY    pantoprazole (PROTONIX) tablet 40 mg  40 mg Oral ACB    pioglitazone (ACTOS) tablet 30 mg  30 mg Oral DAILY    heparin 25,000 units in dextrose 500 mL infusion  12-25 Units/kg/hr IntraVENous TITRATE    influenza vaccine 2019-20 (6 mos+)(PF) (FLUARIX/FLULAVAL/FLUZONE QUAD) injection 0.5 mL  0.5 mL IntraMUSCular PRIOR TO DISCHARGE    insulin lispro (HUMALOG) injection   SubCUTAneous AC&HS       Diet:  DIET NUTRITIONAL SUPPLEMENTS  DIET DIABETIC WITH OPTIONS    Other Studies (last 24 hours):  No results found. Assessment and Plan:     Hospital Problems as of 9/18/2019 Date Reviewed: 9/11/2019          Codes Class Noted - Resolved POA    CAD (coronary artery disease) ICD-10-CM: I25.10  ICD-9-CM: 414.00  9/16/2019 - Present Yes        [facility] ICD-10-CM: E11.9  ICD-9-CM: 250.00  6/21/2012 - Present Yes    Overview Signed 9/20/2016  9:58 AM by Noris Aponte     followed by PCP               HLD (hyperlipidemia) ICD-10-CM: E78.5  ICD-9-CM: 272.4  6/21/2012 - Present Yes    Overview Signed 9/20/2016  9:59 AM by Noris Aponte     followed by PCP               HTN (hypertension) ICD-10-CM: I10  ICD-9-CM: 401.9  6/21/2012 - Present Yes    Overview Signed 9/20/2016  9:58 AM by Noris Aponte     readings in target range                   A/P:    unc dm req.  Insulin    Titrate lantus up to 40units hs start tonight   increase fixed dose prandial novolog 8u  and continue ss novolog equiv. Goal slowly  Titrate <180 fasting short term and more aggressive long term   Expect surgical stressor may effect needs post op and will need lower dose nocturnal basal when npo for surgical intervention. DM education appreciated. Will continue to follow / titrate insulins as needed    Signed:  Justice FOOTE

## 2019-09-18 NOTE — DIABETES MGMT
Patient's blood glucose ranged 229-271 yesterday with patient receiving Lantus 32 units, Humalog 30 units, and pioglitazone 30 mg. Blood glucose 203 this morning. Lantus increased to 36 units tonight by provider. Spoke with provider and order received to increase prandial insulin as well. New regimen: Lantus 36 units QHS, Humalog 8 units with meals, Humalog SSI, and pioglitazone 30 mg daily. Continue to follow along.

## 2019-09-18 NOTE — PROGRESS NOTES
[facility] CARDIOLOGY PROGRESS NOTE           9/18/2019 8:54 AM    Admit Date: 9/16/2019         Subjective: Denies CP or SOB, CABG on Monday    ROS:  Cardiovascular:  As noted above    Objective:      Vitals:    09/17/19 1911 09/17/19 2222 09/18/19 0305 09/18/19 0731   BP: 123/58 154/67 110/53 119/59   Pulse: 69 66 60 (!) 58   Resp: 18 18 18 20   Temp: 98.4 °F (36.9 °C) 98.6 °F (37 °C) 97.6 °F (36.4 °C) 97.1 °F (36.2 °C)   SpO2: 98% 99% 95% 99%   Weight:   73.2 kg (161 lb 6.4 oz)    Height:           Physical Exam:  General: Well Developed, Well Nourished, No Acute Distress, Alert & Oriented x 3, Appropriate mood  Neck: supple, no JVD  Heart: S1S2 with RRR without murmurs or gallops  Lungs: Clear throughout auscultation bilaterally without adventitious sounds  Abd: soft, nontender, nondistended, with good bowel sounds  Ext: no edema bilaterally  Skin: warm and dry      Data Review:   Recent Labs     09/18/19  0452 09/17/19  0511   NA  --  142   K  --  3.6   MG  --  1.7*   BUN  --  10   CREA  --  0.53*   GLU  --  253*   WBC 6.9 6.5   HGB 12.8 13.5   HCT 38.5 40.9    202   INR  --  1.0       No results for input(s): TNIPOC, TROIQ in the last 72 hours.       Assessment/Plan:     Active Problems:    CAD (coronary artery disease) (9/16/2019)      DM2 (diabetes mellitus, type 2) ([facility] (6/21/2012)      Overview: followed by PCP            HLD (hyperlipidemia) (6/21/2012)      Overview: followed by PCP            HTN (hypertension) (6/21/2012)      Overview: readings in target range    A/P  1) CAD - hep gtt, after plavix washout, aspirin allergy  2) LVEF - preserved  3) HTN - controlled         Norma Starks MD  9/18/2019 8:54 AM

## 2019-09-19 LAB
APTT PPP: 119.2 SEC (ref 24.7–39.8)
GLUCOSE BLD STRIP.AUTO-MCNC: 166 MG/DL (ref 65–100)
GLUCOSE BLD STRIP.AUTO-MCNC: 223 MG/DL (ref 65–100)
GLUCOSE BLD STRIP.AUTO-MCNC: 259 MG/DL (ref 65–100)
GLUCOSE BLD STRIP.AUTO-MCNC: 270 MG/DL (ref 65–100)

## 2019-09-19 PROCEDURE — 74011250637 HC RX REV CODE- 250/637: Performed by: INTERNAL MEDICINE

## 2019-09-19 PROCEDURE — 36415 COLL VENOUS BLD VENIPUNCTURE: CPT

## 2019-09-19 PROCEDURE — 74011250636 HC RX REV CODE- 250/636: Performed by: INTERNAL MEDICINE

## 2019-09-19 PROCEDURE — 82962 GLUCOSE BLOOD TEST: CPT

## 2019-09-19 PROCEDURE — 74011636637 HC RX REV CODE- 636/637: Performed by: FAMILY MEDICINE

## 2019-09-19 PROCEDURE — 85730 THROMBOPLASTIN TIME PARTIAL: CPT

## 2019-09-19 PROCEDURE — 65660000000 HC RM CCU STEPDOWN

## 2019-09-19 PROCEDURE — 74011636637 HC RX REV CODE- 636/637: Performed by: INTERNAL MEDICINE

## 2019-09-19 RX ORDER — CEFAZOLIN SODIUM/WATER 2 G/20 ML
2 SYRINGE (ML) INTRAVENOUS
Status: CANCELLED | OUTPATIENT
Start: 2019-09-23 | End: 2019-09-24

## 2019-09-19 RX ORDER — VANCOMYCIN 1.75 GRAM/500 ML IN 0.9 % SODIUM CHLORIDE INTRAVENOUS
1750 ONCE
Status: DISCONTINUED | OUTPATIENT
Start: 2019-09-20 | End: 2019-09-20

## 2019-09-19 RX ORDER — MUPIROCIN 20 MG/G
OINTMENT TOPICAL 2 TIMES DAILY
Status: DISCONTINUED | OUTPATIENT
Start: 2019-09-20 | End: 2019-09-23 | Stop reason: SDUPTHER

## 2019-09-19 RX ORDER — INSULIN LISPRO 100 [IU]/ML
10 INJECTION, SOLUTION INTRAVENOUS; SUBCUTANEOUS
Status: DISCONTINUED | OUTPATIENT
Start: 2019-09-19 | End: 2019-09-20

## 2019-09-19 RX ORDER — VANCOMYCIN 2 GRAM/500 ML IN 0.9 % SODIUM CHLORIDE INTRAVENOUS
2000 ONCE
Status: DISCONTINUED | OUTPATIENT
Start: 2019-09-23 | End: 2019-09-19

## 2019-09-19 RX ORDER — AMIODARONE HYDROCHLORIDE 200 MG/1
600 TABLET ORAL EVERY 12 HOURS
Status: COMPLETED | OUTPATIENT
Start: 2019-09-22 | End: 2019-09-23

## 2019-09-19 RX ADMIN — ACETAMINOPHEN 650 MG: 325 TABLET, FILM COATED ORAL at 06:30

## 2019-09-19 RX ADMIN — ATORVASTATIN CALCIUM 40 MG: 40 TABLET, FILM COATED ORAL at 08:52

## 2019-09-19 RX ADMIN — INSULIN LISPRO 6 UNITS: 100 INJECTION, SOLUTION INTRAVENOUS; SUBCUTANEOUS at 17:50

## 2019-09-19 RX ADMIN — PIOGLITAZONE 30 MG: 30 TABLET ORAL at 08:52

## 2019-09-19 RX ADMIN — AMLODIPINE BESYLATE 2.5 MG: 5 TABLET ORAL at 11:36

## 2019-09-19 RX ADMIN — INSULIN LISPRO 10 UNITS: 100 INJECTION, SOLUTION INTRAVENOUS; SUBCUTANEOUS at 17:50

## 2019-09-19 RX ADMIN — INSULIN LISPRO 8 UNITS: 100 INJECTION, SOLUTION INTRAVENOUS; SUBCUTANEOUS at 08:52

## 2019-09-19 RX ADMIN — INSULIN LISPRO 4 UNITS: 100 INJECTION, SOLUTION INTRAVENOUS; SUBCUTANEOUS at 21:13

## 2019-09-19 RX ADMIN — HEPARIN SODIUM 16 UNITS/KG/HR: 5000 INJECTION, SOLUTION INTRAVENOUS at 17:05

## 2019-09-19 RX ADMIN — PANTOPRAZOLE SODIUM 40 MG: 40 TABLET, DELAYED RELEASE ORAL at 06:22

## 2019-09-19 RX ADMIN — MONTELUKAST SODIUM 10 MG: 10 TABLET, FILM COATED ORAL at 08:52

## 2019-09-19 RX ADMIN — Medication 10 ML: at 21:17

## 2019-09-19 RX ADMIN — INSULIN LISPRO 10 UNITS: 100 INJECTION, SOLUTION INTRAVENOUS; SUBCUTANEOUS at 12:35

## 2019-09-19 RX ADMIN — INSULIN GLARGINE 40 UNITS: 100 INJECTION, SOLUTION SUBCUTANEOUS at 21:12

## 2019-09-19 RX ADMIN — INSULIN LISPRO 2 UNITS: 100 INJECTION, SOLUTION INTRAVENOUS; SUBCUTANEOUS at 08:54

## 2019-09-19 RX ADMIN — Medication 10 ML: at 06:24

## 2019-09-19 RX ADMIN — DIPHENHYDRAMINE HYDROCHLORIDE 25 MG: 25 CAPSULE ORAL at 22:39

## 2019-09-19 RX ADMIN — METOPROLOL SUCCINATE 25 MG: 25 TABLET, EXTENDED RELEASE ORAL at 11:37

## 2019-09-19 RX ADMIN — INSULIN LISPRO 6 UNITS: 100 INJECTION, SOLUTION INTRAVENOUS; SUBCUTANEOUS at 12:35

## 2019-09-19 NOTE — PROGRESS NOTES
Bedside shift report received from Gregg Antonio RN (offgoing nurse). Report given to Molina Lacy RN. Vital signs stable. No complaints present. Patient in stable condition.

## 2019-09-19 NOTE — DIABETES MGMT
Blood glucose ranged 185-272 yesterday with patient receiving Lantus 40 units, Humalog 40 units, and pioglitazone 30 mg. Blood glucose 166 this morning. Spoke with provider and order received to increase prandial dose to Humalog 10 units with meals. Patient awaiting CABG next week. Patient educated that her insulin needs will likely change post surgery. Patient will likely need further titration in Lantus if fasting glucose does not improve.

## 2019-09-19 NOTE — PROGRESS NOTES
Hospitalist Progress Note     Admit Date:  2019  7:09 AM   Name:  Natalie Lagos   Age:  75 y.o.  :  1945   MRN:  [id number]   PCP:  Clarita Webb MD  Treatment Team: Attending Provider: Janice Snyder MD; Consulting Provider: Darrius Lowery MD; Care Manager: Mirella De Leon; Utilization Review: Naya Roberto RN; Consulting Provider: Evy Wolf MD    Subjective:   HPI and or CC:  Summary from prior medicine consult note:     Patient is a 65yo F with hx DM, HTN, CAD, and remote TIA admitted for cath, found to have severe multivessel disease and now pending CABG after plavix washout.     Hospitalist service consulted for uncontrolled DM     Last a1c 12.9 on  - PCP had her on invokana, metformin, glipizide, actos, and 24u tresbia qhs. Pt not sure about invokana or glipizide, but reports intermittent use of the others. A1c pending here. Cost and compliance are historical barriers to her management. She rarely checks blood sugar because it always says \"high\"    19:  Sugars fsbs <200 last pm and this am. To increase prandial to 10u and continue current basal dose. She is aware she will need insulin long term. For probable cabg early next week.         Objective:     Patient Vitals for the past 24 hrs:   Temp Pulse Resp BP SpO2   19 0852  64  98/54    19 0707 97.9 °F (36.6 °C) (!) 58 17 92/54 98 %   19 0333 97.5 °F (36.4 °C) (!) 58 18 118/58 94 %   19 2232 98.2 °F (36.8 °C) 62 18 136/62 97 %   19 1845 97 °F (36.1 °C) 61 20 121/57 98 %   19 1601 97.5 °F (36.4 °C) 60 18 99/52 98 %   19 1158 97.8 °F (36.6 °C) (!) 57 16 108/57 100 %     Oxygen Therapy  O2 Sat (%): 98 % (19 0707)  Pulse via Oximetry: 58 beats per minute (19 0333)  O2 Device: Room air (19 6178)  O2 Flow Rate (L/min): 2 l/min (19 0950)    Intake/Output Summary (Last 24 hours) at 2019 1105  Last data filed at 2019 6191  Gross per 24 hour   Intake 2858.58 ml   Output 4200 ml   Net -1341.42 ml         REVIEW OF SYSTEMS: Comprehensive ROS performed and negative except as stated in HPI. Physical Examination:  Physical Exam   Constitutional: She is oriented to person, place, and time. aa0 x3   HENT:   Head: Normocephalic. Nose: Nose normal.   Eyes: Pupils are equal, round, and reactive to light. Conjunctivae are normal.   Neck: Normal range of motion. No JVD present. Cardiovascular: Regular rhythm. Exam reveals no friction rub. Pulmonary/Chest: Effort normal and breath sounds normal. She has no rales. Abdominal: Soft. She exhibits no distension. Musculoskeletal: Normal range of motion. She exhibits no edema. Neurological: She is oriented to person, place, and time. No cranial nerve deficit. Skin: Skin is warm. No erythema. Psychiatric: Mood and judgment normal.       Data Review:  I have reviewed all labs, meds, telemetry events, and studies from the last 24 hours. Recent Results (from the past 24 hour(s))   GLUCOSE, POC    Collection Time: 09/18/19 11:59 AM   Result Value Ref Range    Glucose (POC) 272 (H) 65 - 100 mg/dL   GLUCOSE, POC    Collection Time: 09/18/19  4:01 PM   Result Value Ref Range    Glucose (POC) 225 (H) 65 - 100 mg/dL   GLUCOSE, POC    Collection Time: 09/18/19  9:12 PM   Result Value Ref Range    Glucose (POC) 185 (H) 65 - 100 mg/dL   PTT    Collection Time: 09/18/19 11:07 PM   Result Value Ref Range    aPTT 98.0 (H) 24.7 - 39.8 SEC   GLUCOSE, POC    Collection Time: 09/19/19  6:24 AM   Result Value Ref Range    Glucose (POC) 166 (H) 65 - 100 mg/dL        All Micro Results     Procedure Component Value Units Date/Time    MSSA/MRSA SC BY PCR, NASAL SWAB [[id number]] Collected:  09/17/19 2115    Order Status:  Completed Specimen:  Nasal swab Updated:  09/17/19 7118     Special Requests: NO SPECIAL REQUESTS        Culture result:       SA target not detected. A MRSA NEGATIVE, SA NEGATIVE test result does not preclude MRSA or SA nasal colonization. Current Meds:  Current Facility-Administered Medications   Medication Dose Route Frequency    insulin lispro (HUMALOG) injection 10 Units  10 Units SubCUTAneous TIDAC    insulin glargine (LANTUS) injection 40 Units  40 Units SubCUTAneous QHS    diphenhydrAMINE (BENADRYL) capsule 25 mg  25 mg Oral Q6H PRN    saline peripheral flush soln 5 mL  5 mL InterCATHeter PRN    sodium chloride (NS) flush 5-40 mL  5-40 mL IntraVENous Q8H    sodium chloride (NS) flush 5-40 mL  5-40 mL IntraVENous PRN    acetaminophen (TYLENOL) tablet 650 mg  650 mg Oral Q4H PRN    HYDROcodone-acetaminophen (NORCO) 5-325 mg per tablet 1 Tab  1 Tab Oral Q4H PRN    morphine injection 1 mg  1 mg IntraVENous Q4H PRN    naloxone (NARCAN) injection 0.4 mg  0.4 mg IntraVENous PRN    amLODIPine (NORVASC) tablet 2.5 mg  2.5 mg Oral DAILY    atorvastatin (LIPITOR) tablet 40 mg  40 mg Oral DAILY    cyclobenzaprine (FLEXERIL) tablet 10 mg  10 mg Oral TID PRN    metoprolol succinate (TOPROL-XL) XL tablet 25 mg  25 mg Oral DAILY    montelukast (SINGULAIR) tablet 10 mg  10 mg Oral DAILY    pantoprazole (PROTONIX) tablet 40 mg  40 mg Oral ACB    pioglitazone (ACTOS) tablet 30 mg  30 mg Oral DAILY    heparin 25,000 units in dextrose 500 mL infusion  12-25 Units/kg/hr IntraVENous TITRATE    influenza vaccine 2019-20 (6 mos+)(PF) (FLUARIX/FLULAVAL/FLUZONE QUAD) injection 0.5 mL  0.5 mL IntraMUSCular PRIOR TO DISCHARGE    insulin lispro (HUMALOG) injection   SubCUTAneous AC&HS       Diet:  DIET NUTRITIONAL SUPPLEMENTS  DIET DIABETIC WITH OPTIONS    Other Studies (last 24 hours):  No results found.     Assessment and Plan:     Hospital Problems as of 9/19/2019 Date Reviewed: 9/11/2019          Codes Class Noted - Resolved POA    CAD (coronary artery disease) ICD-10-CM: I25.10  ICD-9-CM: 414.00  9/16/2019 - Present Yes        DM[facility] (diabetes mellitus, type 2) [facility] ICD-10-CM: E11.9  ICD-9-CM: 250.00  6/21/2012 - Present Yes    Overview Signed 9/20/2016  9:58 AM by Jozef Mahoney     followed by PCP               HLD (hyperlipidemia) ICD-10-CM: E78.5  ICD-9-CM: 272.4  6/21/2012 - Present Yes    Overview Signed 9/20/2016  9:59 AM by Jozef Mahoney     followed by PCP               HTN (hypertension) ICD-10-CM: I10  ICD-9-CM: 401.9  6/21/2012 - Present Yes    Overview Signed 9/20/2016  9:58 AM by Jozef Mahoney     readings in target range                   A/P:    unc dm req. Insulin    Current dose lantus   increase fixed dose prandial novolog 10u   Will follow peripherally     nocturnal basal decrease night prior to 1/2 current dose when npo for surgical intervention. Tentatively Sunday night for am surgery Monday. Signed:  Justice FOOTE

## 2019-09-19 NOTE — PROGRESS NOTES
Admit Date:  2019  7:09 AM   Name:  Trish Aldrich   Age:  75 y.o.  :  1945   MRN:  [id number]       Subjective:   Patient is a 67yo F with hx DM, HTN, CAD, and remote TIA admitted for cath, found to have severe multivessel disease and now pending CABG after plavix washout.     19: Pt asleep when I stopped by, was groggy but managed to answer basic questions. Pt denies any chest pain and \"wants to go back to sleep. \"    Review of Systems   Constitutional: Negative for chills, diaphoresis and fever. Eyes: Negative for photophobia and pain. Respiratory: Negative for cough, shortness of breath and wheezing. Cardiovascular: Negative for chest pain, palpitations, claudication and leg swelling. Gastrointestinal: Negative for nausea and vomiting. Musculoskeletal: Negative. Neurological: Negative. Psychiatric/Behavioral: Negative. Objective:     Patient Vitals for the past 24 hrs:   Temp Pulse Resp BP SpO2   19 0707 97.9 °F (36.6 °C) (!) 58 17 92/54 98 %   19 0333 97.5 °F (36.4 °C) (!) 58 18 118/58 94 %   19 2232 98.2 °F (36.8 °C) 62 18 136/62 97 %   19 1845 97 °F (36.1 °C) 61 20 121/57 98 %   19 1601 97.5 °F (36.4 °C) 60 18 99/52 98 %   19 1158 97.8 °F (36.6 °C) (!) 57 16 108/57 100 %   19 1029  75        Oxygen Therapy  O2 Sat (%): 98 % (19)  Pulse via Oximetry: 58 beats per minute (19 0333)  O2 Device: Room air (19)  O2 Flow Rate (L/min): 2 l/min (19 0950)    Intake/Output Summary (Last 24 hours) at 2019 0758  Last data filed at 2019 8809  Gross per 24 hour   Intake 2858.58 ml   Output 4800 ml   Net -1941.42 ml         Physical Examination:  Physical Exam   Constitutional: She is well-developed, well-nourished, and in no distress. HENT:   Head: Normocephalic and atraumatic.    Right Ear: External ear normal.   Left Ear: External ear normal.   Nose: Nose normal.   Eyes: Pupils are equal, round, and reactive to light. Conjunctivae and EOM are normal. No scleral icterus. Neck: Neck supple. No tracheal deviation present. No thyromegaly present. Cardiovascular: Normal rate, regular rhythm and intact distal pulses. Exam reveals no gallop and no friction rub. No murmur heard. Pulmonary/Chest: Effort normal and breath sounds normal. No respiratory distress. She has no wheezes. She has no rales. Abdominal: Soft. Bowel sounds are normal. She exhibits no distension and no mass. There is no tenderness. There is no rebound and no guarding. Musculoskeletal: She exhibits no tenderness or deformity. Neurological: She is alert. GCS score is 15. Skin: Skin is dry. No rash noted. She is not diaphoretic. Psychiatric: Memory and affect normal.       Data Review:    Results for Cherise Bailey (MRN [id number]) as of 9/19/2019 08:05   Ref. Range 9/18/2019 04:52   WBC Latest Ref Range: 4.3 - 11.1 K/uL 6.9   RBC Latest Ref Range: 4.05 - 5.2 M/uL 3.79 (L)   HGB Latest Ref Range: 11.7 - 15.4 g/dL 12.8   HCT Latest Ref Range: 35.8 - 46.3 % 38.5   MCV Latest Ref Range: 79.6 - 97.8 .6 (H)   MCH Latest Ref Range: 26.1 - 32.9 PG 33.8 (H)   MCHC Latest Ref Range: 31.4 - 35.0 g/dL 33.2   RDW Latest Ref Range: 11.9 - 14.6 % 12.5   PLATELET Latest Ref Range: 150 - 450 K/uL 198   MPV Latest Ref Range: 9.4 - 12.3 FL 11.1   NEUTROPHILS Latest Ref Range: 43 - 78 % 50   LYMPHOCYTES Latest Ref Range: 13 - 44 % 36   MONOCYTES Latest Ref Range: 4.0 - 12.0 % 11   EOSINOPHILS Latest Ref Range: 0.5 - 7.8 % 2   BASOPHILS Latest Ref Range: 0.0 - 2.0 % 1   IMMATURE GRANULOCYTES Latest Ref Range: 0.0 - 5.0 % 0   DF Latest Units:   AUTOMATED   ABSOLUTE NRBC Latest Ref Range: 0.0 - 0.2 K/uL 0.00   ABS. NEUTROPHILS Latest Ref Range: 1.7 - 8.2 K/UL 3.4   ABS. IMM. GRANS. Latest Ref Range: 0.0 - 0.5 K/UL 0.0   ABS. LYMPHOCYTES Latest Ref Range: 0.5 - 4.6 K/UL 2.5   ABS.  MONOCYTES Latest Ref Range: 0.1 - 1.3 K/UL 0.8 ABS. EOSINOPHILS Latest Ref Range: 0.0 - 0.8 K/UL 0.1   ABS. BASOPHILS Latest Ref Range: 0.0 - 0.2 K/UL 0.0   Results for Grabiel Patton (MRN [id number]) as of 9/19/2019 08:05   Ref. Range 9/17/2019 05:11   Sodium Latest Ref Range: 136 - 145 mmol/L 142   Potassium Latest Ref Range: 3.5 - 5.1 mmol/L 3.6   Chloride Latest Ref Range: 98 - 107 mmol/L 107   CO2 Latest Ref Range: 21 - 32 mmol/L 29   Anion gap Latest Ref Range: 7 - 16 mmol/L 6 (L)   Glucose Latest Ref Range: 65 - 100 mg/dL 253 (H)   BUN Latest Ref Range: 8 - 23 MG/DL 10   Creatinine Latest Ref Range: 0.6 - 1.0 MG/DL 0.53 (L)   Calcium Latest Ref Range: 8.3 - 10.4 MG/DL 8.7   Magnesium Latest Ref Range: 1.8 - 2.4 mg/dL 1.7 (L)   GFR est non-AA Latest Ref Range: >60 ml/min/1.73m2 >60   GFR est AA Latest Ref Range: >60 ml/min/1.73m2 >60   Bilirubin, total Latest Ref Range: 0.2 - 1.1 MG/DL 0.5   Protein, total Latest Ref Range: 6.3 - 8.2 g/dL 6.2 (L)   Albumin Latest Ref Range: 3.2 - 4.6 g/dL 2.9 (L)   Globulin Latest Ref Range: 2.3 - 3.5 g/dL 3.3   A-G Ratio Latest Ref Range: 1.2 - 3.5   0.9 (L)   ALT (SGPT) Latest Ref Range: 12 - 65 U/L 17   AST Latest Ref Range: 15 - 37 U/L 14 (L)   Alk. phosphatase Latest Ref Range: 50 - 136 U/L 103   Hemoglobin A1c, (calculated) Latest Ref Range: 4.8 - 6.0 % 11.9 (H)   Est. average glucose Latest Units: mg/dL 295     Results for Grabiel Patton (MRN [id number]) as of 9/19/2019 08:05   Ref.  Range 9/17/2019 05:11   INR Latest Units:   1.0        Current Meds:  Current Facility-Administered Medications   Medication Dose Route Frequency    insulin lispro (HUMALOG) injection 8 Units  8 Units SubCUTAneous TIDAC    insulin glargine (LANTUS) injection 40 Units  40 Units SubCUTAneous QHS    diphenhydrAMINE (BENADRYL) capsule 25 mg  25 mg Oral Q6H PRN    saline peripheral flush soln 5 mL  5 mL InterCATHeter PRN    sodium chloride (NS) flush 5-40 mL  5-40 mL IntraVENous Q8H    sodium chloride (NS) flush 5-40 mL 5-40 mL IntraVENous PRN    acetaminophen (TYLENOL) tablet 650 mg  650 mg Oral Q4H PRN    HYDROcodone-acetaminophen (NORCO) 5-325 mg per tablet 1 Tab  1 Tab Oral Q4H PRN    morphine injection 1 mg  1 mg IntraVENous Q4H PRN    naloxone (NARCAN) injection 0.4 mg  0.4 mg IntraVENous PRN    amLODIPine (NORVASC) tablet 2.5 mg  2.5 mg Oral DAILY    atorvastatin (LIPITOR) tablet 40 mg  40 mg Oral DAILY    cyclobenzaprine (FLEXERIL) tablet 10 mg  10 mg Oral TID PRN    metoprolol succinate (TOPROL-XL) XL tablet 25 mg  25 mg Oral DAILY    montelukast (SINGULAIR) tablet 10 mg  10 mg Oral DAILY    pantoprazole (PROTONIX) tablet 40 mg  40 mg Oral ACB    pioglitazone (ACTOS) tablet 30 mg  30 mg Oral DAILY    heparin 25,000 units in dextrose 500 mL infusion  12-25 Units/kg/hr IntraVENous TITRATE    influenza vaccine 2019-20 (6 mos+)(PF) (FLUARIX/FLULAVAL/FLUZONE QUAD) injection 0.5 mL  0.5 mL IntraMUSCular PRIOR TO DISCHARGE    insulin lispro (HUMALOG) injection   SubCUTAneous AC&HS       Diet:  DIET NUTRITIONAL SUPPLEMENTS  DIET DIABETIC WITH OPTIONS    CXR: Findings consistent with COPD  Carotid b/l duplex ultrasound:   - DESTINY: No hemodynamically significant stenosis  - LICA: No hemodynamically significant stenosis  LE Duplex ultrasound:  - Right greater saphenous v appears patent; however, adjacent thromobosed varicose veins are present in the mid calf  - Left small saphenous vein is thrombosed  Echo:  - LV: Systolic fxn was normal. EF 60-65%. No regional wall motion abnormalities. LV diastolic fxn parameters were normal  - RV: Mild pulmonary artery HTN  - Aortic valve: Trileaflet valve. Exhibits mild sclerosis  - Mitral valve: mild regurg  - Tricuspid: mild to moderate regurg      Assessment and Plan:       CAD  - CABG surgery planned for Monday after Plavix washout.  - Continue cardiac monitoring  - The current medical regimen is effective;  continue present plan and medications. HTN  - w/in target range. The current medical regimen is effective;  continue present plan and medications. HLD  - Run lipid panel if needed  - Have followed by PCP    Uncontrolled DM2  - Allow hospitalist and PCP to manage.         Adri Marte MD

## 2019-09-19 NOTE — PROGRESS NOTES
Bedside shift change report given to [facility] [id number] (oncoming nurse) by Jannet Zurita (offgoing nurse). Report included the following information SBAR, Kardex, Intake/Output, MAR, Recent Results and Cardiac Rhythm NSR/Sinus Tilda Meo.

## 2019-09-20 LAB
APTT PPP: 109.2 SEC (ref 24.7–39.8)
APTT PPP: 178.8 SEC (ref 24.7–39.8)
APTT PPP: 61 SEC (ref 24.7–39.8)
ERYTHROCYTE [DISTWIDTH] IN BLOOD BY AUTOMATED COUNT: 12.7 % (ref 11.9–14.6)
GLUCOSE BLD STRIP.AUTO-MCNC: 124 MG/DL (ref 65–100)
GLUCOSE BLD STRIP.AUTO-MCNC: 130 MG/DL (ref 65–100)
GLUCOSE BLD STRIP.AUTO-MCNC: 195 MG/DL (ref 65–100)
GLUCOSE BLD STRIP.AUTO-MCNC: 299 MG/DL (ref 65–100)
HCT VFR BLD AUTO: 39.4 % (ref 35.8–46.3)
HGB BLD-MCNC: 12.8 G/DL (ref 11.7–15.4)
MAGNESIUM SERPL-MCNC: 1.7 MG/DL (ref 1.8–2.4)
MCH RBC QN AUTO: 33 PG (ref 26.1–32.9)
MCHC RBC AUTO-ENTMCNC: 32.5 G/DL (ref 31.4–35)
MCV RBC AUTO: 101.5 FL (ref 79.6–97.8)
NRBC # BLD: 0 K/UL (ref 0–0.2)
PLATELET # BLD AUTO: 223 K/UL (ref 150–450)
PMV BLD AUTO: 11.2 FL (ref 9.4–12.3)
RBC # BLD AUTO: 3.88 M/UL (ref 4.05–5.2)
WBC # BLD AUTO: 6.7 K/UL (ref 4.3–11.1)

## 2019-09-20 PROCEDURE — 36415 COLL VENOUS BLD VENIPUNCTURE: CPT

## 2019-09-20 PROCEDURE — 65660000000 HC RM CCU STEPDOWN

## 2019-09-20 PROCEDURE — 85730 THROMBOPLASTIN TIME PARTIAL: CPT

## 2019-09-20 PROCEDURE — 83735 ASSAY OF MAGNESIUM: CPT

## 2019-09-20 PROCEDURE — 74011250637 HC RX REV CODE- 250/637: Performed by: INTERNAL MEDICINE

## 2019-09-20 PROCEDURE — 74011636637 HC RX REV CODE- 636/637: Performed by: FAMILY MEDICINE

## 2019-09-20 PROCEDURE — 74011250637 HC RX REV CODE- 250/637: Performed by: PHYSICIAN ASSISTANT

## 2019-09-20 PROCEDURE — 74011636637 HC RX REV CODE- 636/637: Performed by: INTERNAL MEDICINE

## 2019-09-20 PROCEDURE — 74011250636 HC RX REV CODE- 250/636: Performed by: INTERNAL MEDICINE

## 2019-09-20 PROCEDURE — 85027 COMPLETE CBC AUTOMATED: CPT

## 2019-09-20 PROCEDURE — 82962 GLUCOSE BLOOD TEST: CPT

## 2019-09-20 PROCEDURE — 74011250636 HC RX REV CODE- 250/636: Performed by: THORACIC SURGERY (CARDIOTHORACIC VASCULAR SURGERY)

## 2019-09-20 PROCEDURE — 74011250636 HC RX REV CODE- 250/636: Performed by: NURSE PRACTITIONER

## 2019-09-20 RX ORDER — MAGNESIUM SULFATE HEPTAHYDRATE 40 MG/ML
2 INJECTION, SOLUTION INTRAVENOUS ONCE
Status: COMPLETED | OUTPATIENT
Start: 2019-09-20 | End: 2019-09-20

## 2019-09-20 RX ORDER — INSULIN LISPRO 100 [IU]/ML
15 INJECTION, SOLUTION INTRAVENOUS; SUBCUTANEOUS
Status: DISCONTINUED | OUTPATIENT
Start: 2019-09-20 | End: 2019-09-24

## 2019-09-20 RX ORDER — INSULIN GLARGINE 100 [IU]/ML
45 INJECTION, SOLUTION SUBCUTANEOUS
Status: DISCONTINUED | OUTPATIENT
Start: 2019-09-20 | End: 2019-09-20

## 2019-09-20 RX ORDER — INSULIN GLARGINE 100 [IU]/ML
50 INJECTION, SOLUTION SUBCUTANEOUS
Status: DISCONTINUED | OUTPATIENT
Start: 2019-09-20 | End: 2019-09-24 | Stop reason: SDUPTHER

## 2019-09-20 RX ORDER — HEPARIN SODIUM 5000 [USP'U]/ML
35 INJECTION, SOLUTION INTRAVENOUS; SUBCUTANEOUS ONCE
Status: COMPLETED | OUTPATIENT
Start: 2019-09-20 | End: 2019-09-20

## 2019-09-20 RX ORDER — LANOLIN ALCOHOL/MO/W.PET/CERES
400 CREAM (GRAM) TOPICAL DAILY
Status: DISCONTINUED | OUTPATIENT
Start: 2019-09-21 | End: 2019-09-24

## 2019-09-20 RX ORDER — VANCOMYCIN 2 GRAM/500 ML IN 0.9 % SODIUM CHLORIDE INTRAVENOUS
2000 ONCE
Status: CANCELLED | OUTPATIENT
Start: 2019-09-23 | End: 2019-09-23

## 2019-09-20 RX ADMIN — INSULIN LISPRO 6 UNITS: 100 INJECTION, SOLUTION INTRAVENOUS; SUBCUTANEOUS at 08:19

## 2019-09-20 RX ADMIN — INSULIN LISPRO 2 UNITS: 100 INJECTION, SOLUTION INTRAVENOUS; SUBCUTANEOUS at 12:04

## 2019-09-20 RX ADMIN — Medication 10 ML: at 21:05

## 2019-09-20 RX ADMIN — INSULIN GLARGINE 50 UNITS: 100 INJECTION, SOLUTION SUBCUTANEOUS at 21:03

## 2019-09-20 RX ADMIN — MONTELUKAST SODIUM 10 MG: 10 TABLET, FILM COATED ORAL at 09:19

## 2019-09-20 RX ADMIN — MUPIROCIN: 20 OINTMENT TOPICAL at 10:18

## 2019-09-20 RX ADMIN — PANTOPRAZOLE SODIUM 40 MG: 40 TABLET, DELAYED RELEASE ORAL at 05:50

## 2019-09-20 RX ADMIN — AMLODIPINE BESYLATE 2.5 MG: 5 TABLET ORAL at 09:19

## 2019-09-20 RX ADMIN — HEPARIN SODIUM 13 UNITS/KG/HR: 1000 INJECTION INTRAVENOUS; SUBCUTANEOUS at 09:18

## 2019-09-20 RX ADMIN — PIOGLITAZONE 30 MG: 30 TABLET ORAL at 09:19

## 2019-09-20 RX ADMIN — DIPHENHYDRAMINE HYDROCHLORIDE 25 MG: 25 CAPSULE ORAL at 23:29

## 2019-09-20 RX ADMIN — HEPARIN SODIUM 2500 UNITS: 5000 INJECTION INTRAVENOUS; SUBCUTANEOUS at 16:30

## 2019-09-20 RX ADMIN — INSULIN LISPRO 15 UNITS: 100 INJECTION, SOLUTION INTRAVENOUS; SUBCUTANEOUS at 18:34

## 2019-09-20 RX ADMIN — METOPROLOL SUCCINATE 25 MG: 25 TABLET, EXTENDED RELEASE ORAL at 09:19

## 2019-09-20 RX ADMIN — ATORVASTATIN CALCIUM 40 MG: 40 TABLET, FILM COATED ORAL at 09:19

## 2019-09-20 RX ADMIN — ACETAMINOPHEN 650 MG: 325 TABLET, FILM COATED ORAL at 00:51

## 2019-09-20 RX ADMIN — Medication 10 ML: at 14:00

## 2019-09-20 RX ADMIN — MUPIROCIN: 20 OINTMENT TOPICAL at 18:00

## 2019-09-20 RX ADMIN — INSULIN LISPRO 15 UNITS: 100 INJECTION, SOLUTION INTRAVENOUS; SUBCUTANEOUS at 08:19

## 2019-09-20 RX ADMIN — INSULIN LISPRO 15 UNITS: 100 INJECTION, SOLUTION INTRAVENOUS; SUBCUTANEOUS at 12:04

## 2019-09-20 RX ADMIN — Medication 10 ML: at 05:50

## 2019-09-20 RX ADMIN — MAGNESIUM SULFATE HEPTAHYDRATE 2 G: 40 INJECTION, SOLUTION INTRAVENOUS at 09:19

## 2019-09-20 NOTE — PROGRESS NOTES
Spoke with Mohit Ritter in the Pharmacy about mixing patient's Heparin in NS instead of Dextrose. Mohit Ritter agreed and will mix Heparin in NS.

## 2019-09-20 NOTE — PROGRESS NOTES
Hospitalist Progress Note     Admit Date:  2019  7:09 AM   Name:  Gely Cruz   Age:  75 y.o.  :  1945   MRN:  [id number]   PCP:  Lionel Cesar MD  Treatment Team: Attending Provider: Jaiden Alvarado MD; Consulting Provider: Yazan Roman MD; Care Manager: Reuben Lam; Utilization Review: Nora Otero RN; Consulting Provider: Fer Newman MD; Primary Nurse: Jesus Alberto Ochoa RN    Subjective:   HPI and or CC:  Following sugars peripherally, unclear reason for am 299 when 160's am yesterday. Counseled again as amie brought her hard sugar candy and sugar sodas in room but she claims they are visitors and she is not eating. She is aware planned cardiac surgery Monday. Objective:     Patient Vitals for the past 24 hrs:   Temp Pulse Resp BP SpO2   19 1554 97.5 °F (36.4 °C) 60 18 150/67 97 %   19 1125 97.5 °F (36.4 °C) 60 18 120/57 97 %   19 0753 97.5 °F (36.4 °C) (!) 57 16 120/57 94 %   19 0306 97.4 °F (36.3 °C) (!) 59 18 126/61 97 %   19 2241 97.1 °F (36.2 °C) 70 18 109/53 96 %   19 1910 97.6 °F (36.4 °C) 73 18 107/56 99 %     Oxygen Therapy  O2 Sat (%): 97 % (19 1554)  Pulse via Oximetry: 58 beats per minute (19 0333)  O2 Device: Room air (19 1554)  O2 Flow Rate (L/min): 2 l/min (19 0950)    Intake/Output Summary (Last 24 hours) at 2019 1810  Last data filed at 2019 1631  Gross per 24 hour   Intake 1771.3 ml   Output 4700 ml   Net -2928.7 ml         REVIEW OF SYSTEMS: Comprehensive ROS performed and negative except as stated in HPI. Physical Examination:  Physical Exam   Constitutional: She is oriented  HENT:   Head:no assymetry  Nose: Nares patent  Eyes: Pupils are equal, round, and reactive to light. Conjunctivae are normal.   Neck: Normal range of motion. No JVD present. Cardiovascular: Regular rhythm. Exam reveals no friction rub.    Pulmonary/Chest: Effort normal and breath sounds normal. She has no rales. Abdominal: Soft. She exhibits no distension. Musculoskeletal: Normal range of motion. She exhibits no edema. Neurological: She is oriented to person, place, and time. No cranial nerve deficit. Skin: Skin is warm. No erythema. Psychiatric: Mood and judgment normal.     Data Review:  I have reviewed all labs, meds, telemetry events, and studies from the last 24 hours.     Recent Results (from the past 24 hour(s))   GLUCOSE, POC    Collection Time: 09/19/19  9:12 PM   Result Value Ref Range    Glucose (POC) 223 (H) 65 - 100 mg/dL   PTT    Collection Time: 09/19/19 10:37 PM   Result Value Ref Range    aPTT 119.2 (H) 24.7 - 39.8 SEC   CBC W/O DIFF    Collection Time: 09/20/19  5:31 AM   Result Value Ref Range    WBC 6.7 4.3 - 11.1 K/uL    RBC 3.88 (L) 4.05 - 5.2 M/uL    HGB 12.8 11.7 - 15.4 g/dL    HCT 39.4 35.8 - 46.3 %    .5 (H) 79.6 - 97.8 FL    MCH 33.0 (H) 26.1 - 32.9 PG    MCHC 32.5 31.4 - 35.0 g/dL    RDW 12.7 11.9 - 14.6 %    PLATELET 431 000 - 854 K/uL    MPV 11.2 9.4 - 12.3 FL    ABSOLUTE NRBC 0.00 0.0 - 0.2 K/uL   MAGNESIUM    Collection Time: 09/20/19  5:31 AM   Result Value Ref Range    Magnesium 1.7 (L) 1.8 - 2.4 mg/dL   PTT    Collection Time: 09/20/19  5:31 AM   Result Value Ref Range    aPTT 178.8 (HH) 24.7 - 39.8 SEC   GLUCOSE, POC    Collection Time: 09/20/19  5:51 AM   Result Value Ref Range    Glucose (POC) 299 (H) 65 - 100 mg/dL   GLUCOSE, POC    Collection Time: 09/20/19 11:26 AM   Result Value Ref Range    Glucose (POC) 195 (H) 65 - 100 mg/dL   PTT    Collection Time: 09/20/19  2:52 PM   Result Value Ref Range    aPTT 61.0 (H) 24.7 - 39.8 SEC   GLUCOSE, POC    Collection Time: 09/20/19  5:12 PM   Result Value Ref Range    Glucose (POC) 124 (H) 65 - 100 mg/dL        All Micro Results     Procedure Component Value Units Date/Time    MSSA/MRSA SC BY PCR, NASAL SWAB [[id number]]     Order Status:  Sent Specimen:  Nasal swab     MSSA/MRSA SC BY PCR, NASAL SWAB [[id number]] Collected:  09/17/19 2115    Order Status:  Completed Specimen:  Nasal swab Updated:  09/17/19 9596     Special Requests: NO SPECIAL REQUESTS        Culture result:       SA target not detected. A MRSA NEGATIVE, SA NEGATIVE test result does not preclude MRSA or SA nasal colonization.                 Current Meds:  Current Facility-Administered Medications   Medication Dose Route Frequency    [START ON 9/23/2019] EPINEPHrine (ADRENALIN) 4 mg in 0.9% sodium chloride 250 mL infusion  1-10 mcg/min IntraVENous TITRATE    [START ON 9/23/2019] heparin 10,000 units in NS 1000 ml irrigation  1,000 mL Irrigation ONCE    [START ON 9/23/2019] insulin regular (NOVOLIN R, HUMULIN R) 100 Units in 0.9% sodium chloride 100 mL infusion  1-10 Units/hr IntraVENous TITRATE    [START ON 9/23/2019] aminocaproic acid (AMICAR) 5 g in 0.9% sodium chloride 250 mL infusion  5 g IntraVENous ONCE    [START ON 9/23/2019] aminocaproic acid (AMICAR) 10 g in 0.9% sodium chloride 250 mL infusion  10 g IntraVENous ONCE    heparin (porcine) 25,000 Units in 0.9% sodium chloride 500 mL infusion  12-25 Units/kg/hr IntraVENous CONTINUOUS    insulin glargine (LANTUS) injection 50 Units  50 Units SubCUTAneous QHS    insulin lispro (HUMALOG) injection 15 Units  15 Units SubCUTAneous TIDAC    [START ON 9/22/2019] amiodarone (CORDARONE) tablet 600 mg  600 mg Oral Q12H    mupirocin (BACTROBAN) 2 % ointment   Both Nostrils BID    diphenhydrAMINE (BENADRYL) capsule 25 mg  25 mg Oral Q6H PRN    saline peripheral flush soln 5 mL  5 mL InterCATHeter PRN    sodium chloride (NS) flush 5-40 mL  5-40 mL IntraVENous Q8H    sodium chloride (NS) flush 5-40 mL  5-40 mL IntraVENous PRN    acetaminophen (TYLENOL) tablet 650 mg  650 mg Oral Q4H PRN    HYDROcodone-acetaminophen (NORCO) 5-325 mg per tablet 1 Tab  1 Tab Oral Q4H PRN    morphine injection 1 mg  1 mg IntraVENous Q4H PRN    naloxone (NARCAN) injection 0.4 mg  0.4 mg IntraVENous PRN    amLODIPine (NORVASC) tablet 2.5 mg  2.5 mg Oral DAILY    atorvastatin (LIPITOR) tablet 40 mg  40 mg Oral DAILY    cyclobenzaprine (FLEXERIL) tablet 10 mg  10 mg Oral TID PRN    metoprolol succinate (TOPROL-XL) XL tablet 25 mg  25 mg Oral DAILY    montelukast (SINGULAIR) tablet 10 mg  10 mg Oral DAILY    pantoprazole (PROTONIX) tablet 40 mg  40 mg Oral ACB    pioglitazone (ACTOS) tablet 30 mg  30 mg Oral DAILY    influenza vaccine 2019-20 (6 mos+)(PF) (FLUARIX/FLULAVAL/FLUZONE QUAD) injection 0.5 mL  0.5 mL IntraMUSCular PRIOR TO DISCHARGE    insulin lispro (HUMALOG) injection   SubCUTAneous AC&HS       Diet:  DIET NUTRITIONAL SUPPLEMENTS  DIET DIABETIC WITH OPTIONS  DIET NPO    Other Studies (last 24 hours):  No results found. Assessment and Plan:     Hospital Problems as of 9/20/2019 Date Reviewed: 9/11/2019          Codes Class Noted - Resolved POA    CAD (coronary artery disease) ICD-10-CM: I25.10  ICD-9-CM: 414.00  9/16/2019 - Present Yes        DM[facility] (diabetes mellitus, type 2) ([facility] ICD-10-CM: E11.9  ICD-9-CM: 250.00  6/21/2012 - Present Yes    Overview Signed 9/20/2016  9:58 AM by Redmond Essex     followed by PCP               HLD (hyperlipidemia) ICD-10-CM: E78.5  ICD-9-CM: 272.4  6/21/2012 - Present Yes    Overview Signed 9/20/2016  9:59 AM by Redmond Essex     followed by PCP               HTN (hypertension) ICD-10-CM: I10  ICD-9-CM: 401.9  6/21/2012 - Present Yes    Overview Signed 9/20/2016  9:58 AM by Redmond Essex     readings in target range                   A/P:    unc dm req. Insulin                   titrae lantus              increase fixed dose prandial novolog               Will follow peripherally   Counseled re: diet      nocturnal basal decrease night prior to 1/2 current dose when npo for surgical intervention. Tentatively Sunday night for am surgery Monday. Signed:  Justice FOOTE

## 2019-09-20 NOTE — DIABETES MGMT
Blood glucose ranged 166-270 yesterday with patient receiving Lantus 40 units, Humalog 46 units, and Actos 30 mg. Blood glucose 299 this morning. Patient is selective about what she eats off of meals trays, but supplements with outside foods. Noted empty bag of [employer] peanut butter crackers on breakfast tray. Spoke with provider and received orders to increase Lantus to 50 units QHS and prandial insulin to 15 units with meals. Reviewed with patient. Patient was able to verbalize adequate knowledge of how many carbs she should have with meals. Patient reports she once went on the [employer] in the past with her  and lost 30 pounds. Patient states she used to be 218 lbs but she has purposely worked her way down the scale a little bit at a time. Encouraged patient to continue to work on ensuring her meals are balanced and not too carb heavy and snacks are more balanced as well if she chooses to eat them as patient states she got away from snacking and was just eating meals. Patient to have CABG Monday will need her Lantus dose reduced on Sunday night to reduce risk for hypoglycemia.

## 2019-09-20 NOTE — PROGRESS NOTES
CTS-pt states she had \"a twinge\" in her chest this morning but she is unsure if this was related to her heart or possible acid reflux symptoms. She denies any dyspnea. CABG surgery planned for Monday after Plavix washout. Blood glucose levels remain uncontrolled. Hospitalists following.      Beni Marie PA-C

## 2019-09-20 NOTE — PROGRESS NOTES
Admit Date:  2019  7:09 AM   Name:  Areli Lorenzo   Age:  75 y.o.  :  1945   MRN:  [id number]       Subjective:   Patient is a 67yo F with hx uncontrolled DM, HTN, CAD, and remote TIA admitted for cath, found to have severe multivessel disease and now pending CABG after plavix washout.     19: Pt sleeping when I stopped by. Fasting blood sugar today at 5:51 AM was 299. When woken up for the exam she said she wasn't \"in the mood\" to answer questions and wanted to go back to sleep. She is aware of CABG next week following Plavix washout. ROS:  Limited due to patient cooperation. Constitutional: (-) fever, chills, HA  Cardiology: (-) chest pain, palpitations, leg swelling  Pulmonology: (-) SOB, cough, wheezing  GI: (-) nausea, vomiting      Objective:     Patient Vitals for the past 24 hrs:   Temp Pulse Resp BP SpO2   19 0306 97.4 °F (36.3 °C) (!) 59 18 126/61 97 %   19 2241 97.1 °F (36.2 °C) 70 18 109/53 96 %   19 1910 97.6 °F (36.4 °C) 73 18 107/56 99 %   19 1554 98.4 °F (36.9 °C) 64 18 98/54 99 %   19 1239  68  122/59    19 1115 97.9 °F (36.6 °C) 63 17 137/58 98 %   19 0852  64  98/54    19 0707 97.9 °F (36.6 °C) (!) 58 17 92/54 98 %     Oxygen Therapy  O2 Sat (%): 97 % (19 030)  Pulse via Oximetry: 58 beats per minute (19 0333)  O2 Device: Room air (19)  O2 Flow Rate (L/min): 2 l/min (19 0950)    Intake/Output Summary (Last 24 hours) at 2019 0649  Last data filed at 2019 0631  Gross per 24 hour   Intake 1549.22 ml   Output 3250 ml   Net -1700.78 ml         Physical Examination (somewhat limited due to pt cooperation):  Physical Exam   Constitutional: She is well-developed, well-nourished, and in no distress. HENT:   Head: Normocephalic and atraumatic. Neck: No JVD present. No tracheal deviation present.    Cardiovascular: Normal rate, regular rhythm, normal heart sounds and intact distal pulses. Exam reveals no gallop and no friction rub. No murmur heard. Pulmonary/Chest: Effort normal and breath sounds normal. No stridor. No respiratory distress. She has no wheezes. She has no rales. Abdominal: Soft. Bowel sounds are normal.   Musculoskeletal: She exhibits no edema, tenderness or deformity. Neurological: She is alert. Skin: Skin is warm and dry. No rash noted. Psychiatric: Memory and affect normal.       Data Review:  Results for Dong Willoughby (MRN [id number]) as of 9/20/2019 06:46   Ref. Range 9/20/2019 05:31   WBC Latest Ref Range: 4.3 - 11.1 K/uL 6.7   RBC Latest Ref Range: 4.05 - 5.2 M/uL 3.88 (L)   HGB Latest Ref Range: 11.7 - 15.4 g/dL 12.8   HCT Latest Ref Range: 35.8 - 46.3 % 39.4   MCV Latest Ref Range: 79.6 - 97.8 .5 (H)   MCH Latest Ref Range: 26.1 - 32.9 PG 33.0 (H)   MCHC Latest Ref Range: 31.4 - 35.0 g/dL 32.5   RDW Latest Ref Range: 11.9 - 14.6 % 12.7   PLATELET Latest Ref Range: 150 - 450 K/uL 223   MPV Latest Ref Range: 9.4 - 12.3 FL 11.2   ABSOLUTE NRBC Latest Ref Range: 0.0 - 0.2 K/uL 0.00   Results for Dong Willoughby (MRN [id number]) as of 9/20/2019 06:46   Ref.  Range 9/20/2019 05:31   aPTT Latest Ref Range: 24.7 - 39.8 .8 (HH)          Current Meds:  Current Facility-Administered Medications   Medication Dose Route Frequency    [START ON 9/23/2019] EPINEPHrine (ADRENALIN) 4 mg in 0.9% sodium chloride 250 mL infusion  1-10 mcg/min IntraVENous TITRATE    [START ON 9/23/2019] heparin 10,000 units in NS 1000 ml irrigation  1,000 mL Irrigation ONCE    [START ON 9/23/2019] insulin regular (NOVOLIN R, HUMULIN R) 100 Units in 0.9% sodium chloride 100 mL infusion  1-10 Units/hr IntraVENous TITRATE    [START ON 9/23/2019] aminocaproic acid (AMICAR) 5 g in 0.9% sodium chloride 250 mL infusion  5 g IntraVENous ONCE    [START ON 9/23/2019] aminocaproic acid (AMICAR) 10 g in 0.9% sodium chloride 250 mL infusion  10 g IntraVENous ONCE    insulin lispro (HUMALOG) injection 10 Units  10 Units SubCUTAneous TIDAC    [START ON 9/22/2019] amiodarone (CORDARONE) tablet 600 mg  600 mg Oral Q12H    mupirocin (BACTROBAN) 2 % ointment   Both Nostrils BID    vancomycin (VANCOCIN) 1750 mg in  ml infusion  1,750 mg IntraVENous ONCE    insulin glargine (LANTUS) injection 40 Units  40 Units SubCUTAneous QHS    diphenhydrAMINE (BENADRYL) capsule 25 mg  25 mg Oral Q6H PRN    saline peripheral flush soln 5 mL  5 mL InterCATHeter PRN    sodium chloride (NS) flush 5-40 mL  5-40 mL IntraVENous Q8H    sodium chloride (NS) flush 5-40 mL  5-40 mL IntraVENous PRN    acetaminophen (TYLENOL) tablet 650 mg  650 mg Oral Q4H PRN    HYDROcodone-acetaminophen (NORCO) 5-325 mg per tablet 1 Tab  1 Tab Oral Q4H PRN    morphine injection 1 mg  1 mg IntraVENous Q4H PRN    naloxone (NARCAN) injection 0.4 mg  0.4 mg IntraVENous PRN    amLODIPine (NORVASC) tablet 2.5 mg  2.5 mg Oral DAILY    atorvastatin (LIPITOR) tablet 40 mg  40 mg Oral DAILY    cyclobenzaprine (FLEXERIL) tablet 10 mg  10 mg Oral TID PRN    metoprolol succinate (TOPROL-XL) XL tablet 25 mg  25 mg Oral DAILY    montelukast (SINGULAIR) tablet 10 mg  10 mg Oral DAILY    pantoprazole (PROTONIX) tablet 40 mg  40 mg Oral ACB    pioglitazone (ACTOS) tablet 30 mg  30 mg Oral DAILY    heparin 25,000 units in dextrose 500 mL infusion  12-25 Units/kg/hr IntraVENous TITRATE    influenza vaccine 2019-20 (6 mos+)(PF) (FLUARIX/FLULAVAL/FLUZONE QUAD) injection 0.5 mL  0.5 mL IntraMUSCular PRIOR TO DISCHARGE    insulin lispro (HUMALOG) injection   SubCUTAneous AC&HS       Diet:  DIET NUTRITIONAL SUPPLEMENTS  DIET DIABETIC WITH OPTIONS  DIET NPO    No new imaging or testing. Assessment and Plan:       CAD  - CABG surgery planned for Monday after Plavix washout.  - Continue cardiac monitoring  - The current medical regimen is effective;  continue present plan and medications. HTN  - w/in target range.  The current medical regimen is effective;  continue present plan and medications. HLD  - Run lipid panel if needed  - Have followed by PCP    Uncontrolled DM2  - Allow hospitalist and PCP to manage.          Joan Mckinley

## 2019-09-20 NOTE — PROGRESS NOTES
[facility] Opportunity for questions, concerns. Patient involved.

## 2019-09-20 NOTE — PROGRESS NOTES
IV heparin rate has been adjusted based on the most recent PTT results.     Lab Results   Component Value Date/Time    aPTT 119.2 (H) 09/19/2019 10:37 PM

## 2019-09-20 NOTE — PROGRESS NOTES
Bedside shift report given to Virginie Dowell RN (oncoming nurse) by Clinton Hernadez RN (offgoing nurse). Bedside shift report included the following information: SBAR, Kardex, ED Summary, MAR, and Recent Results.

## 2019-09-20 NOTE — PROGRESS NOTES
IV heparin rate has been adjusted based on the most recent PTT results. Lab Results   Component Value Date/Time    APTT 61.0 (H) 09/20/2019 02:52 PM         Next PTT 2230. Heparin bolused 2,500 units.    Heparin increaced to 15u/kg/hr

## 2019-09-20 NOTE — PROGRESS NOTES
CM continues to follow for any CM needs and/or discharge planning. At this time, plan remains that she will have CABG tentatively Monday. No additional CM needs at this time. Will continue to monitor.

## 2019-09-20 NOTE — PROGRESS NOTES
Dual verified with Virginie Dowell RN. Heparin gtt on hold for 1 hr. Margarito Sanchezmarcianojasmin will return in 1 hr to restart gtt at 13units/kg/hr and repeat PTT in 6hrs.

## 2019-09-21 LAB
ANION GAP SERPL CALC-SCNC: 7 MMOL/L (ref 7–16)
APTT PPP: 79.1 SEC (ref 24.7–39.8)
APTT PPP: 94.1 SEC (ref 24.7–39.8)
BUN SERPL-MCNC: 10 MG/DL (ref 8–23)
CALCIUM SERPL-MCNC: 8.8 MG/DL (ref 8.3–10.4)
CHLORIDE SERPL-SCNC: 108 MMOL/L (ref 98–107)
CO2 SERPL-SCNC: 29 MMOL/L (ref 21–32)
CREAT SERPL-MCNC: 0.42 MG/DL (ref 0.6–1)
GLUCOSE BLD STRIP.AUTO-MCNC: 130 MG/DL (ref 65–100)
GLUCOSE BLD STRIP.AUTO-MCNC: 130 MG/DL (ref 65–100)
GLUCOSE BLD STRIP.AUTO-MCNC: 184 MG/DL (ref 65–100)
GLUCOSE BLD STRIP.AUTO-MCNC: 246 MG/DL (ref 65–100)
GLUCOSE SERPL-MCNC: 120 MG/DL (ref 65–100)
POTASSIUM SERPL-SCNC: 3.6 MMOL/L (ref 3.5–5.1)
SODIUM SERPL-SCNC: 144 MMOL/L (ref 136–145)

## 2019-09-21 PROCEDURE — 85730 THROMBOPLASTIN TIME PARTIAL: CPT

## 2019-09-21 PROCEDURE — 74011250637 HC RX REV CODE- 250/637: Performed by: INTERNAL MEDICINE

## 2019-09-21 PROCEDURE — 65660000000 HC RM CCU STEPDOWN

## 2019-09-21 PROCEDURE — 80048 BASIC METABOLIC PNL TOTAL CA: CPT

## 2019-09-21 PROCEDURE — 74011636637 HC RX REV CODE- 636/637: Performed by: FAMILY MEDICINE

## 2019-09-21 PROCEDURE — 36415 COLL VENOUS BLD VENIPUNCTURE: CPT

## 2019-09-21 PROCEDURE — 74011250636 HC RX REV CODE- 250/636: Performed by: INTERNAL MEDICINE

## 2019-09-21 PROCEDURE — 82962 GLUCOSE BLOOD TEST: CPT

## 2019-09-21 PROCEDURE — 74011636637 HC RX REV CODE- 636/637: Performed by: INTERNAL MEDICINE

## 2019-09-21 RX ORDER — MAG HYDROX/ALUMINUM HYD/SIMETH 200-200-20
30 SUSPENSION, ORAL (FINAL DOSE FORM) ORAL
Status: DISCONTINUED | OUTPATIENT
Start: 2019-09-21 | End: 2019-09-24

## 2019-09-21 RX ADMIN — Medication 10 ML: at 05:51

## 2019-09-21 RX ADMIN — INSULIN LISPRO 15 UNITS: 100 INJECTION, SOLUTION INTRAVENOUS; SUBCUTANEOUS at 12:00

## 2019-09-21 RX ADMIN — INSULIN GLARGINE 50 UNITS: 100 INJECTION, SOLUTION SUBCUTANEOUS at 21:57

## 2019-09-21 RX ADMIN — INSULIN LISPRO 15 UNITS: 100 INJECTION, SOLUTION INTRAVENOUS; SUBCUTANEOUS at 08:03

## 2019-09-21 RX ADMIN — METOPROLOL SUCCINATE 25 MG: 25 TABLET, EXTENDED RELEASE ORAL at 09:34

## 2019-09-21 RX ADMIN — AMLODIPINE BESYLATE 2.5 MG: 5 TABLET ORAL at 09:33

## 2019-09-21 RX ADMIN — MUPIROCIN: 20 OINTMENT TOPICAL at 18:35

## 2019-09-21 RX ADMIN — PANTOPRAZOLE SODIUM 40 MG: 40 TABLET, DELAYED RELEASE ORAL at 05:51

## 2019-09-21 RX ADMIN — ATORVASTATIN CALCIUM 40 MG: 40 TABLET, FILM COATED ORAL at 09:34

## 2019-09-21 RX ADMIN — INSULIN LISPRO 4 UNITS: 100 INJECTION, SOLUTION INTRAVENOUS; SUBCUTANEOUS at 17:28

## 2019-09-21 RX ADMIN — Medication 10 ML: at 14:03

## 2019-09-21 RX ADMIN — HEPARIN SODIUM 15 UNITS/KG/HR: 1000 INJECTION INTRAVENOUS; SUBCUTANEOUS at 09:33

## 2019-09-21 RX ADMIN — ALUMINUM HYDROXIDE, MAGNESIUM HYDROXIDE, AND SIMETHICONE 30 ML: 200; 200; 20 SUSPENSION ORAL at 22:02

## 2019-09-21 RX ADMIN — Medication 10 ML: at 22:00

## 2019-09-21 RX ADMIN — MUPIROCIN: 20 OINTMENT TOPICAL at 09:00

## 2019-09-21 RX ADMIN — INSULIN LISPRO 2 UNITS: 100 INJECTION, SOLUTION INTRAVENOUS; SUBCUTANEOUS at 21:58

## 2019-09-21 RX ADMIN — DIPHENHYDRAMINE HYDROCHLORIDE 25 MG: 25 CAPSULE ORAL at 21:58

## 2019-09-21 RX ADMIN — INSULIN LISPRO 15 UNITS: 100 INJECTION, SOLUTION INTRAVENOUS; SUBCUTANEOUS at 17:28

## 2019-09-21 RX ADMIN — MONTELUKAST SODIUM 10 MG: 10 TABLET, FILM COATED ORAL at 09:34

## 2019-09-21 RX ADMIN — MAGNESIUM GLUCONATE 500 MG ORAL TABLET 400 MG: 500 TABLET ORAL at 09:34

## 2019-09-21 RX ADMIN — PIOGLITAZONE 30 MG: 30 TABLET ORAL at 09:33

## 2019-09-21 NOTE — PROGRESS NOTES
Admit Date:  2019  7:09 AM   Name:  Valeria Southington   Age:  75 y.o.  :  1945   MRN:  [id number]       Subjective:   Patient is a 65yo F with hx uncontrolled DM, HTN, CAD, and remote TIA admitted for cath, found to have severe multivessel disease and now pending CABG after plavix washout.     19: Pt sleeping when I stopped by. Fasting blood sugar today at 5:51 AM was 299. When woken up for the exam she said she wasn't \"in the mood\" to answer questions and wanted to go back to sleep. She is aware of CABG next week following Plavix washout. ROS:  Limited due to patient cooperation. Constitutional: (-) fever, chills, HA  Cardiology: (-) chest pain, palpitations, leg swelling  Pulmonology: (-) SOB, cough, wheezing  GI: (-) nausea, vomiting      Objective:     Patient Vitals for the past 24 hrs:   Temp Pulse Resp BP SpO2   19 0723  (!) 58  111/58    19 0400 98 °F (36.7 °C) 60 18 134/62 95 %   19 0000 98 °F (36.7 °C) 62 18 131/60 95 %   19 1900 98 °F (36.7 °C) 61 18 122/59 96 %   19 1554 97.5 °F (36.4 °C) 60 18 150/67 97 %   19 1125 97.5 °F (36.4 °C) 60 18 120/57 97 %   19 0753 97.5 °F (36.4 °C) (!) 57 16 120/57 94 %     Oxygen Therapy  O2 Sat (%): 95 % (19 0400)  Pulse via Oximetry: 58 beats per minute (19 0333)  O2 Device: Room air (19 1900)  O2 Flow Rate (L/min): 2 l/min (19 0950)    Intake/Output Summary (Last 24 hours) at 2019 0724  Last data filed at 2019 0636  Gross per 24 hour   Intake 1062.08 ml   Output 3700 ml   Net -2637.92 ml         Physical Examination (somewhat limited due to pt cooperation):  Physical Exam   Constitutional: She is well-developed, well-nourished, and in no distress. HENT:   Head: Normocephalic and atraumatic. Neck: No JVD present. No tracheal deviation present. Cardiovascular: Normal rate, regular rhythm, normal heart sounds and intact distal pulses.  Exam reveals no gallop and no friction rub. No murmur heard. Pulmonary/Chest: Effort normal and breath sounds normal. No stridor. No respiratory distress. She has no wheezes. She has no rales. Abdominal: Soft. Bowel sounds are normal.   Musculoskeletal: She exhibits no edema, tenderness or deformity. Neurological: She is alert. Skin: Skin is warm and dry. No rash noted. Psychiatric: Memory and affect normal.       Data Review:  Results for Grabiel Patton (MRN [id number]) as of 9/20/2019 06:46   Ref. Range 9/20/2019 05:31   WBC Latest Ref Range: 4.3 - 11.1 K/uL 6.7   RBC Latest Ref Range: 4.05 - 5.2 M/uL 3.88 (L)   HGB Latest Ref Range: 11.7 - 15.4 g/dL 12.8   HCT Latest Ref Range: 35.8 - 46.3 % 39.4   MCV Latest Ref Range: 79.6 - 97.8 .5 (H)   MCH Latest Ref Range: 26.1 - 32.9 PG 33.0 (H)   MCHC Latest Ref Range: 31.4 - 35.0 g/dL 32.5   RDW Latest Ref Range: 11.9 - 14.6 % 12.7   PLATELET Latest Ref Range: 150 - 450 K/uL 223   MPV Latest Ref Range: 9.4 - 12.3 FL 11.2   ABSOLUTE NRBC Latest Ref Range: 0.0 - 0.2 K/uL 0.00   Results for Grabiel Patton (MRN [id number]) as of 9/20/2019 06:46   Ref.  Range 9/20/2019 05:31   aPTT Latest Ref Range: 24.7 - 39.8 .8 (HH)          Current Meds:  Current Facility-Administered Medications   Medication Dose Route Frequency    [START ON 9/23/2019] EPINEPHrine (ADRENALIN) 4 mg in 0.9% sodium chloride 250 mL infusion  1-10 mcg/min IntraVENous TITRATE    [START ON 9/23/2019] heparin 10,000 units in NS 1000 ml irrigation  1,000 mL Irrigation ONCE    [START ON 9/23/2019] insulin regular (NOVOLIN R, HUMULIN R) 100 Units in 0.9% sodium chloride 100 mL infusion  1-10 Units/hr IntraVENous TITRATE    [START ON 9/23/2019] aminocaproic acid (AMICAR) 5 g in 0.9% sodium chloride 250 mL infusion  5 g IntraVENous ONCE    [START ON 9/23/2019] aminocaproic acid (AMICAR) 10 g in 0.9% sodium chloride 250 mL infusion  10 g IntraVENous ONCE    heparin (porcine) 25,000 Units in 0.9% sodium chloride 500 mL infusion  12-25 Units/kg/hr IntraVENous CONTINUOUS    insulin glargine (LANTUS) injection 50 Units  50 Units SubCUTAneous QHS    insulin lispro (HUMALOG) injection 15 Units  15 Units SubCUTAneous TIDAC    magnesium oxide (MAG-OX) tablet 400 mg  400 mg Oral DAILY    [START ON 9/22/2019] amiodarone (CORDARONE) tablet 600 mg  600 mg Oral Q12H    mupirocin (BACTROBAN) 2 % ointment   Both Nostrils BID    diphenhydrAMINE (BENADRYL) capsule 25 mg  25 mg Oral Q6H PRN    saline peripheral flush soln 5 mL  5 mL InterCATHeter PRN    sodium chloride (NS) flush 5-40 mL  5-40 mL IntraVENous Q8H    sodium chloride (NS) flush 5-40 mL  5-40 mL IntraVENous PRN    acetaminophen (TYLENOL) tablet 650 mg  650 mg Oral Q4H PRN    HYDROcodone-acetaminophen (NORCO) 5-325 mg per tablet 1 Tab  1 Tab Oral Q4H PRN    morphine injection 1 mg  1 mg IntraVENous Q4H PRN    naloxone (NARCAN) injection 0.4 mg  0.4 mg IntraVENous PRN    amLODIPine (NORVASC) tablet 2.5 mg  2.5 mg Oral DAILY    atorvastatin (LIPITOR) tablet 40 mg  40 mg Oral DAILY    cyclobenzaprine (FLEXERIL) tablet 10 mg  10 mg Oral TID PRN    metoprolol succinate (TOPROL-XL) XL tablet 25 mg  25 mg Oral DAILY    montelukast (SINGULAIR) tablet 10 mg  10 mg Oral DAILY    pantoprazole (PROTONIX) tablet 40 mg  40 mg Oral ACB    pioglitazone (ACTOS) tablet 30 mg  30 mg Oral DAILY    influenza vaccine 2019-20 (6 mos+)(PF) (FLUARIX/FLULAVAL/FLUZONE QUAD) injection 0.5 mL  0.5 mL IntraMUSCular PRIOR TO DISCHARGE    insulin lispro (HUMALOG) injection   SubCUTAneous AC&HS       Diet:  DIET NUTRITIONAL SUPPLEMENTS  DIET DIABETIC WITH OPTIONS  DIET NPO    No new imaging or testing. Assessment and Plan:       CAD  - CABG surgery planned for Monday after Plavix washout.  - Continue cardiac monitoring  - The current medical regimen is effective;  continue present plan and medications. HTN  - w/in target range.  The current medical regimen is effective;  continue present plan and medications. HLD  - Run lipid panel if needed  - Have followed by PCP    Uncontrolled DM2  - Allow hospitalist and PCP to manage.          Tre Rodriguez MD

## 2019-09-21 NOTE — PROGRESS NOTES
Hospitalist Progress Note     Admit Date:  2019  7:09 AM   Name:  Eva Fraction   Age:  75 y.o.  :  1945   MRN:  [id number]   PCP:  Augusto Burch MD  Treatment Team: Attending Provider: Monica Kern MD; Consulting Provider: Akira Almanza MD; Care Manager: Shelley Walsh; Utilization Review: Mio Zambrano RN; Consulting Provider: America Mcgraw MD; Primary Nurse: Mustapha Ortiz RN    Subjective:   HPI and or CC:  Following sugars peripherally now --130's this am   No dyspnea, no diaphoresis        Objective:     Patient Vitals for the past 24 hrs:   Temp Pulse Resp BP SpO2   19 1525 97.3 °F (36.3 °C) 66 18 112/56 99 %   19 1154 97.5 °F (36.4 °C) (!) 58 16 131/62 97 %   19 0723 97.5 °F (36.4 °C) (!) 58 16 111/58 97 %   19 0400 98 °F (36.7 °C) 60 18 134/62 95 %   19 0000 98 °F (36.7 °C) 62 18 131/60 95 %   19 1900 98 °F (36.7 °C) 61 18 122/59 96 %   19 1554 97.5 °F (36.4 °C) 60 18 150/67 97 %     Oxygen Therapy  O2 Sat (%): 99 % (19 1525)  Pulse via Oximetry: 58 beats per minute (19 0333)  O2 Device: Room air (19 1525)  O2 Flow Rate (L/min): 2 l/min (19 0950)    Intake/Output Summary (Last 24 hours) at 2019 1528  Last data filed at 2019 1343  Gross per 24 hour   Intake 1062.08 ml   Output 2850 ml   Net -1787.92 ml         REVIEW OF SYSTEMS: Comprehensive ROS performed and negative except as stated in HPI. Physical Examination:  Physical Exam   Constitutional: She is oriented  HENT:   Head:no assymetry  Nose: Nares patent  Eyes: Pupils are equal, round, and reactive to light. Conjunctivae are normal.   Neck: Normal range of motion. No JVD present. Cardiovascular: Regular rhythm. Exam reveals no friction rub. Pulmonary/Chest: Effort normal and breath sounds normal. She has no rales. Abdominal: Soft. She exhibits no distension. Musculoskeletal: Normal range of motion.  She exhibits no edema. Neurological: She is oriented to person, place, and time. No cranial nerve deficit. Skin: Skin is warm. No erythema. Psychiatric: Mood and judgment normal.     Data Review:  I have reviewed all labs, meds, telemetry events, and studies from the last 24 hours.     Recent Results (from the past 24 hour(s))   GLUCOSE, POC    Collection Time: 09/20/19  5:12 PM   Result Value Ref Range    Glucose (POC) 124 (H) 65 - 100 mg/dL   GLUCOSE, POC    Collection Time: 09/20/19  8:58 PM   Result Value Ref Range    Glucose (POC) 130 (H) 65 - 100 mg/dL   PTT    Collection Time: 09/20/19 10:32 PM   Result Value Ref Range    aPTT 109.2 (H) 24.7 - 40.8 SEC   METABOLIC PANEL, BASIC    Collection Time: 09/21/19  5:15 AM   Result Value Ref Range    Sodium 144 136 - 145 mmol/L    Potassium 3.6 3.5 - 5.1 mmol/L    Chloride 108 (H) 98 - 107 mmol/L    CO2 29 21 - 32 mmol/L    Anion gap 7 7 - 16 mmol/L    Glucose 120 (H) 65 - 100 mg/dL    BUN 10 8 - 23 MG/DL    Creatinine 0.42 (L) 0.6 - 1.0 MG/DL    GFR est AA >60 >60 ml/min/1.73m2    GFR est non-AA >60 >60 ml/min/1.73m2    Calcium 8.8 8.3 - 10.4 MG/DL   PTT    Collection Time: 09/21/19  5:15 AM   Result Value Ref Range    aPTT 94.1 (H) 24.7 - 39.8 SEC   GLUCOSE, POC    Collection Time: 09/21/19  5:53 AM   Result Value Ref Range    Glucose (POC) 130 (H) 65 - 100 mg/dL   PTT    Collection Time: 09/21/19 11:24 AM   Result Value Ref Range    aPTT 79.1 (H) 24.7 - 39.8 SEC   GLUCOSE, POC    Collection Time: 09/21/19 11:55 AM   Result Value Ref Range    Glucose (POC) 130 (H) 65 - 100 mg/dL        All Micro Results     Procedure Component Value Units Date/Time    MSSA/MRSA SC BY PCR, NASAL SWAB [[id number]]     Order Status:  Sent Specimen:  Nasal swab     MSSA/MRSA SC BY PCR, NASAL SWAB [[id number]] Collected:  09/17/19 7421    Order Status:  Completed Specimen:  Nasal swab Updated:  09/17/19 4301     Special Requests: NO SPECIAL REQUESTS        Culture result:       SA target not detected. A MRSA NEGATIVE, SA NEGATIVE test result does not preclude MRSA or SA nasal colonization.                 Current Meds:  Current Facility-Administered Medications   Medication Dose Route Frequency    [START ON 9/23/2019] EPINEPHrine (ADRENALIN) 4 mg in 0.9% sodium chloride 250 mL infusion  1-10 mcg/min IntraVENous TITRATE    [START ON 9/23/2019] heparin 10,000 units in NS 1000 ml irrigation  1,000 mL Irrigation ONCE    [START ON 9/23/2019] insulin regular (NOVOLIN R, HUMULIN R) 100 Units in 0.9% sodium chloride 100 mL infusion  1-10 Units/hr IntraVENous TITRATE    [START ON 9/23/2019] aminocaproic acid (AMICAR) 5 g in 0.9% sodium chloride 250 mL infusion  5 g IntraVENous ONCE    [START ON 9/23/2019] aminocaproic acid (AMICAR) 10 g in 0.9% sodium chloride 250 mL infusion  10 g IntraVENous ONCE    heparin (porcine) 25,000 Units in 0.9% sodium chloride 500 mL infusion  12-25 Units/kg/hr IntraVENous CONTINUOUS    insulin glargine (LANTUS) injection 50 Units  50 Units SubCUTAneous QHS    insulin lispro (HUMALOG) injection 15 Units  15 Units SubCUTAneous TIDAC    magnesium oxide (MAG-OX) tablet 400 mg  400 mg Oral DAILY    [START ON 9/22/2019] amiodarone (CORDARONE) tablet 600 mg  600 mg Oral Q12H    mupirocin (BACTROBAN) 2 % ointment   Both Nostrils BID    diphenhydrAMINE (BENADRYL) capsule 25 mg  25 mg Oral Q6H PRN    saline peripheral flush soln 5 mL  5 mL InterCATHeter PRN    sodium chloride (NS) flush 5-40 mL  5-40 mL IntraVENous Q8H    sodium chloride (NS) flush 5-40 mL  5-40 mL IntraVENous PRN    acetaminophen (TYLENOL) tablet 650 mg  650 mg Oral Q4H PRN    HYDROcodone-acetaminophen (NORCO) 5-325 mg per tablet 1 Tab  1 Tab Oral Q4H PRN    morphine injection 1 mg  1 mg IntraVENous Q4H PRN    naloxone (NARCAN) injection 0.4 mg  0.4 mg IntraVENous PRN    amLODIPine (NORVASC) tablet 2.5 mg  2.5 mg Oral DAILY    atorvastatin (LIPITOR) tablet 40 mg  40 mg Oral DAILY    cyclobenzaprine (FLEXERIL) tablet 10 mg  10 mg Oral TID PRN    metoprolol succinate (TOPROL-XL) XL tablet 25 mg  25 mg Oral DAILY    montelukast (SINGULAIR) tablet 10 mg  10 mg Oral DAILY    pantoprazole (PROTONIX) tablet 40 mg  40 mg Oral ACB    pioglitazone (ACTOS) tablet 30 mg  30 mg Oral DAILY    influenza vaccine 2019-20 (6 mos+)(PF) (FLUARIX/FLULAVAL/FLUZONE QUAD) injection 0.5 mL  0.5 mL IntraMUSCular PRIOR TO DISCHARGE    insulin lispro (HUMALOG) injection   SubCUTAneous AC&HS       Diet:  DIET NUTRITIONAL SUPPLEMENTS  DIET DIABETIC WITH OPTIONS  DIET NPO    Other Studies (last 24 hours):  No results found. Assessment and Plan:     Hospital Problems as of 9/21/2019 Date Reviewed: 9/11/2019          Codes Class Noted - Resolved POA    CAD (coronary artery disease) ICD-10-CM: I25.10  ICD-9-CM: 414.00  9/16/2019 - Present Yes        DM[facility] (diabetes mellitus, type 2) ([facility] ICD-10-CM: E11.9  ICD-9-CM: 250.00  6/21/2012 - Present Yes    Overview Signed 9/20/2016  9:58 AM by Jennie Kim     followed by PCP               HLD (hyperlipidemia) ICD-10-CM: E78.5  ICD-9-CM: 272.4  6/21/2012 - Present Yes    Overview Signed 9/20/2016  9:59 AM by Jennie Kim     followed by PCP               HTN (hypertension) ICD-10-CM: I10  ICD-9-CM: 401.9  6/21/2012 - Present Yes    Overview Signed 9/20/2016  9:58 AM by Jennie Kim     readings in target range                   A/P:    unc dm req. Insulin                   same lantus --1/2 current dose on Sunday night in prep or Monday am              fixed dose prandial novolog               Will follow peripherally         nocturnal basal decrease night prior to 1/2 current dose when npo for surgical intervention. Tentatively Sunday night for am surgery Monday. Signed:  Justice FOOTE

## 2019-09-22 ENCOUNTER — ANESTHESIA EVENT (OUTPATIENT)
Dept: SURGERY | Age: 74
DRG: 234 | End: 2019-09-22
Payer: MEDICARE

## 2019-09-22 LAB
APTT PPP: 85.5 SEC (ref 24.7–39.8)
ERYTHROCYTE [DISTWIDTH] IN BLOOD BY AUTOMATED COUNT: 12.7 % (ref 11.9–14.6)
GLUCOSE BLD STRIP.AUTO-MCNC: 119 MG/DL (ref 65–100)
GLUCOSE BLD STRIP.AUTO-MCNC: 176 MG/DL (ref 65–100)
GLUCOSE BLD STRIP.AUTO-MCNC: 182 MG/DL (ref 65–100)
GLUCOSE BLD STRIP.AUTO-MCNC: 194 MG/DL (ref 65–100)
HCT VFR BLD AUTO: 39.8 % (ref 35.8–46.3)
HGB BLD-MCNC: 13.3 G/DL (ref 11.7–15.4)
MCH RBC QN AUTO: 33.5 PG (ref 26.1–32.9)
MCHC RBC AUTO-ENTMCNC: 33.4 G/DL (ref 31.4–35)
MCV RBC AUTO: 100.3 FL (ref 79.6–97.8)
NRBC # BLD: 0 K/UL (ref 0–0.2)
PLATELET # BLD AUTO: 229 K/UL (ref 150–450)
PMV BLD AUTO: 10.8 FL (ref 9.4–12.3)
RBC # BLD AUTO: 3.97 M/UL (ref 4.05–5.2)
WBC # BLD AUTO: 7.1 K/UL (ref 4.3–11.1)

## 2019-09-22 PROCEDURE — 74011250636 HC RX REV CODE- 250/636: Performed by: INTERNAL MEDICINE

## 2019-09-22 PROCEDURE — 65660000000 HC RM CCU STEPDOWN

## 2019-09-22 PROCEDURE — 74011636637 HC RX REV CODE- 636/637: Performed by: INTERNAL MEDICINE

## 2019-09-22 PROCEDURE — 77030019605

## 2019-09-22 PROCEDURE — 36415 COLL VENOUS BLD VENIPUNCTURE: CPT

## 2019-09-22 PROCEDURE — 74011250637 HC RX REV CODE- 250/637: Performed by: INTERNAL MEDICINE

## 2019-09-22 PROCEDURE — 77030012890

## 2019-09-22 PROCEDURE — 82962 GLUCOSE BLOOD TEST: CPT

## 2019-09-22 PROCEDURE — 74011250637 HC RX REV CODE- 250/637: Performed by: PHYSICIAN ASSISTANT

## 2019-09-22 PROCEDURE — 85730 THROMBOPLASTIN TIME PARTIAL: CPT

## 2019-09-22 PROCEDURE — 74011636637 HC RX REV CODE- 636/637: Performed by: FAMILY MEDICINE

## 2019-09-22 PROCEDURE — 85027 COMPLETE CBC AUTOMATED: CPT

## 2019-09-22 PROCEDURE — 86923 COMPATIBILITY TEST ELECTRIC: CPT

## 2019-09-22 PROCEDURE — 86900 BLOOD TYPING SEROLOGIC ABO: CPT

## 2019-09-22 RX ORDER — CEFAZOLIN SODIUM/WATER 2 G/20 ML
2 SYRINGE (ML) INTRAVENOUS
Status: COMPLETED | OUTPATIENT
Start: 2019-09-23 | End: 2019-09-23

## 2019-09-22 RX ADMIN — MUPIROCIN: 20 OINTMENT TOPICAL at 08:35

## 2019-09-22 RX ADMIN — INSULIN GLARGINE 50 UNITS: 100 INJECTION, SOLUTION SUBCUTANEOUS at 21:30

## 2019-09-22 RX ADMIN — INSULIN LISPRO 15 UNITS: 100 INJECTION, SOLUTION INTRAVENOUS; SUBCUTANEOUS at 08:30

## 2019-09-22 RX ADMIN — MONTELUKAST SODIUM 10 MG: 10 TABLET, FILM COATED ORAL at 08:31

## 2019-09-22 RX ADMIN — AMIODARONE HYDROCHLORIDE 600 MG: 200 TABLET ORAL at 17:21

## 2019-09-22 RX ADMIN — INSULIN LISPRO 15 UNITS: 100 INJECTION, SOLUTION INTRAVENOUS; SUBCUTANEOUS at 17:22

## 2019-09-22 RX ADMIN — INSULIN LISPRO 2 UNITS: 100 INJECTION, SOLUTION INTRAVENOUS; SUBCUTANEOUS at 21:30

## 2019-09-22 RX ADMIN — INSULIN LISPRO 2 UNITS: 100 INJECTION, SOLUTION INTRAVENOUS; SUBCUTANEOUS at 11:36

## 2019-09-22 RX ADMIN — ALUMINUM HYDROXIDE, MAGNESIUM HYDROXIDE, AND SIMETHICONE 30 ML: 200; 200; 20 SUSPENSION ORAL at 16:15

## 2019-09-22 RX ADMIN — PIOGLITAZONE 30 MG: 30 TABLET ORAL at 08:31

## 2019-09-22 RX ADMIN — METOPROLOL SUCCINATE 25 MG: 25 TABLET, EXTENDED RELEASE ORAL at 08:31

## 2019-09-22 RX ADMIN — HEPARIN SODIUM 15 UNITS/KG/HR: 1000 INJECTION INTRAVENOUS; SUBCUTANEOUS at 10:01

## 2019-09-22 RX ADMIN — Medication 10 ML: at 05:15

## 2019-09-22 RX ADMIN — INSULIN LISPRO 15 UNITS: 100 INJECTION, SOLUTION INTRAVENOUS; SUBCUTANEOUS at 11:36

## 2019-09-22 RX ADMIN — PANTOPRAZOLE SODIUM 40 MG: 40 TABLET, DELAYED RELEASE ORAL at 05:16

## 2019-09-22 RX ADMIN — Medication 10 ML: at 22:00

## 2019-09-22 RX ADMIN — Medication 10 ML: at 14:46

## 2019-09-22 RX ADMIN — INSULIN LISPRO 2 UNITS: 100 INJECTION, SOLUTION INTRAVENOUS; SUBCUTANEOUS at 08:31

## 2019-09-22 RX ADMIN — ATORVASTATIN CALCIUM 40 MG: 40 TABLET, FILM COATED ORAL at 08:31

## 2019-09-22 RX ADMIN — MUPIROCIN: 20 OINTMENT TOPICAL at 17:25

## 2019-09-22 RX ADMIN — MAGNESIUM GLUCONATE 500 MG ORAL TABLET 400 MG: 500 TABLET ORAL at 08:32

## 2019-09-22 RX ADMIN — AMLODIPINE BESYLATE 2.5 MG: 5 TABLET ORAL at 08:31

## 2019-09-22 NOTE — PROGRESS NOTES
Bedside report received from April RN to include SBAR, [facility], I/O, recent results, and cardiac rhythm NSR.

## 2019-09-22 NOTE — PROGRESS NOTES
Subjective:  Symptoms:  Stable. No shortness of breath. Diet:  Adequate intake. Activity level: Normal.    Pain:  She reports no pain. Pt is a 75 y.o F with PMH uncontrolled DM, HTN, CAD was admitted for remote TIA for cath scheduled for tomorrow. Hospitalist was consulted for diabetes management. Pt is seen resting quietly and no acute distress noted. Denies any pain and feeling okay. Temp:  [97.1 °F (36.2 °C)-98.7 °F (37.1 °C)]   Pulse (Heart Rate):  [57-73]   BP: ()/(53-68)   Resp Rate:  [16-18]   O2 Sat (%):  [94 %-100 %]   Weight:  [69.5 kg (153 lb 4.8 oz)-70.8 kg (156 lb)]   No intake/output data recorded. 09/20 1901 - 09/22 0700  In: 7894 [P.O.:1470]  Out: 4550 [Urine:4550]      Objective:  General Appearance:  Comfortable, well-appearing and in no acute distress. Vital signs: (most recent): Blood pressure 134/63, pulse 61, temperature 97.1 °F (36.2 °C), resp. rate 18, height 5' 2\" (1.575 m), weight 69.5 kg (153 lb 4.8 oz), SpO2 100 %, not currently breastfeeding. Vital signs are normal.    Output: Producing urine. HEENT: Normal HEENT exam.    Lungs:  Normal effort and normal respiratory rate. Heart: Normal rate. Regular rhythm. Chest: Asymmetric chest wall expansion. Abdomen: Abdomen is soft. Bowel sounds are normal.   There is no abdominal tenderness. Extremities: Normal range of motion. Pulses: Distal pulses are intact. Neurological: Patient is alert and oriented to person, place and time. Pupils:  Pupils are equal, round, and reactive to light. Skin:  Warm. Active Problems:    CAD (coronary artery disease) (9/16/2019)      DM2 (diabetes mellitus, type 2) ([facility] (6/21/2012)      Overview: followed by PCP            HLD (hyperlipidemia) (6/21/2012)      Overview: followed by PCP            HTN (hypertension) (6/21/2012)      Overview: readings in target range      Assessment:    Condition: In stable condition. Unchanged.         CAD  - CABG surgery planned for tomorow.  - Continue cardiac monitoring    HTN  - w/in target range. Continue current medications.     HLD  - Run lipid panel if needed  - Have followed by PCP     Uncontrolled DM2  - Glucose POC 9/21/19 184. Continue with Glucose POC checks and insulin regimens : Humalog 15 units 3 times before meals and SS 4 times before meals and nightly. D/C Plan : pending cardic cath tomorrow.     Plan of care discussed with Dr. Rupinder Hughes,   Signed:  EMILY Reyes

## 2019-09-22 NOTE — PROGRESS NOTES
Admit Date:  2019  7:09 AM   Name:  Larissa Styles   Age:  75 y.o.  :  1945   MRN:  [id number]       Subjective:   Patient is a 65yo F with hx uncontrolled DM, HTN, CAD, and remote TIA admitted for cath, found to have severe multivessel disease and now pending CABG after plavix washout.     19: Pt sleeping when I stopped by. Fasting blood sugar today at 5:51 AM was 299. When woken up for the exam she said she wasn't \"in the mood\" to answer questions and wanted to go back to sleep. She is aware of CABG next week following Plavix washout. ROS:  Limited due to patient cooperation. Constitutional: (-) fever, chills, HA  Cardiology: (-) chest pain, palpitations, leg swelling  Pulmonology: (-) SOB, cough, wheezing  GI: (-) nausea, vomiting      Objective:     Patient Vitals for the past 24 hrs:   Temp Pulse Resp BP SpO2   19 0400 97.4 °F (36.3 °C) 63 18 115/55 95 %   19 2247 98 °F (36.7 °C) 61 18 117/68 95 %   19 1900 98 °F (36.7 °C) 62 18 113/60 96 %   19 1525 97.3 °F (36.3 °C) 66 18 112/56 99 %   19 1154 97.5 °F (36.4 °C) (!) 58 16 131/62 97 %   19 0723 97.5 °F (36.4 °C) (!) 58 16 111/58 97 %     Oxygen Therapy  O2 Sat (%): 95 % (19 0400)  Pulse via Oximetry: 58 beats per minute (19 0333)  O2 Device: Room air (19 1930)  O2 Flow Rate (L/min): 2 l/min (19 0950)    Intake/Output Summary (Last 24 hours) at 2019 0658  Last data filed at 2019 0317  Gross per 24 hour   Intake 1230 ml   Output 2800 ml   Net -1570 ml         Physical Examination (somewhat limited due to pt cooperation):  Physical Exam   Constitutional: She is well-developed, well-nourished, and in no distress. HENT:   Head: Normocephalic and atraumatic. Neck: No JVD present. No tracheal deviation present. Cardiovascular: Normal rate, regular rhythm, normal heart sounds and intact distal pulses. Exam reveals no gallop and no friction rub.    No murmur heard.  Pulmonary/Chest: Effort normal and breath sounds normal. No stridor. No respiratory distress. She has no wheezes. She has no rales. Abdominal: Soft. Bowel sounds are normal.   Musculoskeletal: She exhibits no edema, tenderness or deformity. Neurological: She is alert. Skin: Skin is warm and dry. No rash noted. Psychiatric: Memory and affect normal.       Data Review:  Results for Lev Balloon (MRN [id number]) as of 9/20/2019 06:46   Ref. Range 9/20/2019 05:31   WBC Latest Ref Range: 4.3 - 11.1 K/uL 6.7   RBC Latest Ref Range: 4.05 - 5.2 M/uL 3.88 (L)   HGB Latest Ref Range: 11.7 - 15.4 g/dL 12.8   HCT Latest Ref Range: 35.8 - 46.3 % 39.4   MCV Latest Ref Range: 79.6 - 97.8 .5 (H)   MCH Latest Ref Range: 26.1 - 32.9 PG 33.0 (H)   MCHC Latest Ref Range: 31.4 - 35.0 g/dL 32.5   RDW Latest Ref Range: 11.9 - 14.6 % 12.7   PLATELET Latest Ref Range: 150 - 450 K/uL 223   MPV Latest Ref Range: 9.4 - 12.3 FL 11.2   ABSOLUTE NRBC Latest Ref Range: 0.0 - 0.2 K/uL 0.00   Results for Lev Balloon (MRN [id number]) as of 9/20/2019 06:46   Ref.  Range 9/20/2019 05:31   aPTT Latest Ref Range: 24.7 - 39.8 .8 (HH)          Current Meds:  Current Facility-Administered Medications   Medication Dose Route Frequency    alum-mag hydroxide-simeth (MYLANTA) oral suspension 30 mL  30 mL Oral Q4H PRN    [START ON 9/23/2019] EPINEPHrine (ADRENALIN) 4 mg in 0.9% sodium chloride 250 mL infusion  1-10 mcg/min IntraVENous TITRATE    [START ON 9/23/2019] heparin 10,000 units in NS 1000 ml irrigation  1,000 mL Irrigation ONCE    [START ON 9/23/2019] insulin regular (NOVOLIN R, HUMULIN R) 100 Units in 0.9% sodium chloride 100 mL infusion  1-10 Units/hr IntraVENous TITRATE    [START ON 9/23/2019] aminocaproic acid (AMICAR) 5 g in 0.9% sodium chloride 250 mL infusion  5 g IntraVENous ONCE    [START ON 9/23/2019] aminocaproic acid (AMICAR) 10 g in 0.9% sodium chloride 250 mL infusion  10 g IntraVENous ONCE    heparin (porcine) 25,000 Units in 0.9% sodium chloride 500 mL infusion  12-25 Units/kg/hr IntraVENous CONTINUOUS    insulin glargine (LANTUS) injection 50 Units  50 Units SubCUTAneous QHS    insulin lispro (HUMALOG) injection 15 Units  15 Units SubCUTAneous TIDAC    magnesium oxide (MAG-OX) tablet 400 mg  400 mg Oral DAILY    amiodarone (CORDARONE) tablet 600 mg  600 mg Oral Q12H    mupirocin (BACTROBAN) 2 % ointment   Both Nostrils BID    diphenhydrAMINE (BENADRYL) capsule 25 mg  25 mg Oral Q6H PRN    saline peripheral flush soln 5 mL  5 mL InterCATHeter PRN    sodium chloride (NS) flush 5-40 mL  5-40 mL IntraVENous Q8H    sodium chloride (NS) flush 5-40 mL  5-40 mL IntraVENous PRN    acetaminophen (TYLENOL) tablet 650 mg  650 mg Oral Q4H PRN    HYDROcodone-acetaminophen (NORCO) 5-325 mg per tablet 1 Tab  1 Tab Oral Q4H PRN    morphine injection 1 mg  1 mg IntraVENous Q4H PRN    naloxone (NARCAN) injection 0.4 mg  0.4 mg IntraVENous PRN    amLODIPine (NORVASC) tablet 2.5 mg  2.5 mg Oral DAILY    atorvastatin (LIPITOR) tablet 40 mg  40 mg Oral DAILY    cyclobenzaprine (FLEXERIL) tablet 10 mg  10 mg Oral TID PRN    metoprolol succinate (TOPROL-XL) XL tablet 25 mg  25 mg Oral DAILY    montelukast (SINGULAIR) tablet 10 mg  10 mg Oral DAILY    pantoprazole (PROTONIX) tablet 40 mg  40 mg Oral ACB    pioglitazone (ACTOS) tablet 30 mg  30 mg Oral DAILY    influenza vaccine 2019-20 (6 mos+)(PF) (FLUARIX/FLULAVAL/FLUZONE QUAD) injection 0.5 mL  0.5 mL IntraMUSCular PRIOR TO DISCHARGE    insulin lispro (HUMALOG) injection   SubCUTAneous AC&HS       Diet:  DIET NUTRITIONAL SUPPLEMENTS  DIET DIABETIC WITH OPTIONS  DIET NPO    No new imaging or testing. Assessment and Plan:       CAD  - CABG surgery planned for Monday after Plavix washout.  - Continue cardiac monitoring  - The current medical regimen is effective;  continue present plan and medications. HTN  - w/in target range.  The current medical regimen is effective;  continue present plan and medications. HLD  - Run lipid panel if needed  - Have followed by PCP    Uncontrolled DM2  - Allow hospitalist and PCP to manage.          Steve Roberts MD

## 2019-09-22 NOTE — ANESTHESIA PREPROCEDURE EVALUATION
Relevant Problems   No relevant active problems       Anesthetic History               Review of Systems / Medical History  Patient summary reviewed and pertinent labs reviewed    Pulmonary                   Neuro/Psych              Cardiovascular    Hypertension          CAD and hyperlipidemia    Exercise tolerance: <4 METS  Comments: 9/22/19      -99% proximal LAD      -95% LCx     - 30% RCA with R>L collaterals   GI/Hepatic/Renal     GERD    Renal disease: stones  Hiatal hernia     Endo/Other    Diabetes: poorly controlled  Hypothyroidism  Arthritis     Other Findings            Physical Exam    Airway  Mallampati: III  TM Distance: 4 - 6 cm  Neck ROM: normal range of motion        Cardiovascular    Rhythm: regular        Pertinent negatives: No peripheral edema   Dental         Pulmonary      Decreased breath sounds: bibasilar           Abdominal         Other Findings            Anesthetic Plan    ASA: 4  Anesthesia type: general    Monitoring Plan: Arterial line, BIS, CVP, [place]-Lg and LISA      Induction: Intravenous  Anesthetic plan and risks discussed with: Patient

## 2019-09-22 NOTE — PROGRESS NOTES
Bedside shift change report given to [facility] (oncoming nurse) by Cecil Flores RN (offgoing nurse). Report included the following information SBAR, Kardex, Intake/Output, MAR, Recent Results, Med Rec Status and Cardiac Rhythm NSR.

## 2019-09-23 ENCOUNTER — ANESTHESIA (OUTPATIENT)
Dept: SURGERY | Age: 74
DRG: 234 | End: 2019-09-23
Payer: MEDICARE

## 2019-09-23 ENCOUNTER — APPOINTMENT (OUTPATIENT)
Dept: GENERAL RADIOLOGY | Age: 74
DRG: 234 | End: 2019-09-23
Attending: THORACIC SURGERY (CARDIOTHORACIC VASCULAR SURGERY)
Payer: MEDICARE

## 2019-09-23 PROBLEM — J98.11 ATELECTASIS: Status: ACTIVE | Noted: 2019-09-23

## 2019-09-23 PROBLEM — R09.02 HYPOXIA: Status: ACTIVE | Noted: 2019-09-23

## 2019-09-23 PROBLEM — Z99.11 ENCOUNTER FOR WEANING FROM VENTILATOR (HCC): Status: ACTIVE | Noted: 2019-09-23

## 2019-09-23 LAB
ANION GAP SERPL CALC-SCNC: 8 MMOL/L (ref 7–16)
APTT PPP: 28 SEC (ref 24.7–39.8)
ARTERIAL PATENCY WRIST A: ABNORMAL
ATRIAL RATE: 82 BPM
BASE DEFICIT BLD-SCNC: 2 MMOL/L
BASE EXCESS BLD CALC-SCNC: 0 MMOL/L
BASE EXCESS BLD CALC-SCNC: 2 MMOL/L
BASE EXCESS BLD CALC-SCNC: 5 MMOL/L
BDY SITE: ABNORMAL
BODY TEMPERATURE: 98.6
BUN SERPL-MCNC: 15 MG/DL (ref 8–23)
CA-I BLD-MCNC: 1.15 MMOL/L (ref 1.12–1.32)
CA-I BLD-MCNC: 1.16 MMOL/L (ref 1.12–1.32)
CA-I BLD-MCNC: 1.16 MMOL/L (ref 1.12–1.32)
CA-I BLD-MCNC: 1.23 MMOL/L (ref 1.12–1.32)
CA-I BLD-MCNC: 1.23 MMOL/L (ref 1.12–1.32)
CA-I BLD-MCNC: 1.26 MMOL/L (ref 1.12–1.32)
CALCIUM SERPL-MCNC: 8.3 MG/DL (ref 8.3–10.4)
CALCULATED P AXIS, ECG09: 68 DEGREES
CALCULATED R AXIS, ECG10: 51 DEGREES
CALCULATED T AXIS, ECG11: 15 DEGREES
CHLORIDE SERPL-SCNC: 112 MMOL/L (ref 98–107)
CO2 SERPL-SCNC: 25 MMOL/L (ref 21–32)
COLLECT TIME,HTIME: 1208
CREAT SERPL-MCNC: 0.73 MG/DL (ref 0.6–1)
DIAGNOSIS, 93000: NORMAL
ERYTHROCYTE [DISTWIDTH] IN BLOOD BY AUTOMATED COUNT: 12.8 % (ref 11.9–14.6)
EXHALED MINUTE VOLUME, VE: 8 L/MIN
FIBRINOGEN PPP-MCNC: 373 MG/DL (ref 190–501)
GAS FLOW.O2 O2 DELIVERY SYS: ABNORMAL L/MIN
GAS FLOW.O2 SETTING OXYMISER: 16 BPM
GLUCOSE BLD STRIP.AUTO-MCNC: 106 MG/DL (ref 65–100)
GLUCOSE BLD STRIP.AUTO-MCNC: 111 MG/DL (ref 65–100)
GLUCOSE BLD STRIP.AUTO-MCNC: 113 MG/DL (ref 65–100)
GLUCOSE BLD STRIP.AUTO-MCNC: 116 MG/DL (ref 65–100)
GLUCOSE BLD STRIP.AUTO-MCNC: 137 MG/DL (ref 65–100)
GLUCOSE BLD STRIP.AUTO-MCNC: 137 MG/DL (ref 65–100)
GLUCOSE BLD STRIP.AUTO-MCNC: 177 MG/DL (ref 65–100)
GLUCOSE BLD STRIP.AUTO-MCNC: 187 MG/DL (ref 65–100)
GLUCOSE BLD STRIP.AUTO-MCNC: 188 MG/DL (ref 65–100)
GLUCOSE BLD STRIP.AUTO-MCNC: 194 MG/DL (ref 65–100)
GLUCOSE BLD STRIP.AUTO-MCNC: 210 MG/DL (ref 65–100)
GLUCOSE BLD STRIP.AUTO-MCNC: 220 MG/DL (ref 65–100)
GLUCOSE BLD STRIP.AUTO-MCNC: 86 MG/DL (ref 65–100)
GLUCOSE BLD STRIP.AUTO-MCNC: 86 MG/DL (ref 65–100)
GLUCOSE BLD STRIP.AUTO-MCNC: 87 MG/DL (ref 65–100)
GLUCOSE SERPL-MCNC: 133 MG/DL (ref 65–100)
HCO3 BLD-SCNC: 22.2 MMOL/L (ref 22–26)
HCO3 BLD-SCNC: 24.5 MMOL/L (ref 22–26)
HCO3 BLD-SCNC: 25 MMOL/L (ref 22–26)
HCO3 BLD-SCNC: 26.1 MMOL/L (ref 22–26)
HCO3 BLD-SCNC: 26.8 MMOL/L (ref 22–26)
HCO3 BLD-SCNC: 28.8 MMOL/L (ref 22–26)
HCT VFR BLD AUTO: 31.9 % (ref 35.8–46.3)
HCT VFR BLD AUTO: 33.3 % (ref 35.8–46.3)
HCT VFR BLD AUTO: 34.1 % (ref 35.8–46.3)
HGB BLD-MCNC: 10.4 G/DL (ref 11.7–15.4)
HGB BLD-MCNC: 10.6 G/DL (ref 11.7–15.4)
HGB BLD-MCNC: 11.1 G/DL (ref 11.7–15.4)
INR PPP: 1.1
MAGNESIUM SERPL-MCNC: 1.9 MG/DL (ref 1.8–2.4)
MAGNESIUM SERPL-MCNC: 2.1 MG/DL (ref 1.8–2.4)
MAGNESIUM SERPL-MCNC: 3.1 MG/DL (ref 1.8–2.4)
MCH RBC QN AUTO: 33.5 PG (ref 26.1–32.9)
MCHC RBC AUTO-ENTMCNC: 32.6 G/DL (ref 31.4–35)
MCV RBC AUTO: 103 FL (ref 79.6–97.8)
NRBC # BLD: 0 K/UL (ref 0–0.2)
O2/TOTAL GAS SETTING VFR VENT: 50 %
P-R INTERVAL, ECG05: 156 MS
PCO2 BLD: 33.8 MMHG (ref 35–45)
PCO2 BLD: 38.4 MMHG (ref 35–45)
PCO2 BLD: 38.8 MMHG (ref 35–45)
PCO2 BLD: 40.2 MMHG (ref 35–45)
PCO2 BLD: 40.5 MMHG (ref 35–45)
PCO2 BLD: 46.3 MMHG (ref 35–45)
PEEP RESPIRATORY: 8 CMH2O
PH BLD: 7.36 [PH] (ref 7.35–7.45)
PH BLD: 7.39 [PH] (ref 7.35–7.45)
PH BLD: 7.42 [PH] (ref 7.35–7.45)
PH BLD: 7.42 [PH] (ref 7.35–7.45)
PH BLD: 7.43 [PH] (ref 7.35–7.45)
PH BLD: 7.48 [PH] (ref 7.35–7.45)
PLATELET # BLD AUTO: 164 K/UL (ref 150–450)
PMV BLD AUTO: 10.9 FL (ref 9.4–12.3)
PO2 BLD: 110 MMHG (ref 75–100)
PO2 BLD: 190 MMHG (ref 75–100)
PO2 BLD: 224 MMHG (ref 75–100)
PO2 BLD: 257 MMHG (ref 75–100)
PO2 BLD: 423 MMHG (ref 75–100)
PO2 BLD: 50 MMHG (ref 75–100)
POTASSIUM BLD-SCNC: 3.3 MMOL/L (ref 3.5–5.1)
POTASSIUM BLD-SCNC: 3.6 MMOL/L (ref 3.5–5.1)
POTASSIUM BLD-SCNC: 3.7 MMOL/L (ref 3.5–5.1)
POTASSIUM BLD-SCNC: 3.7 MMOL/L (ref 3.5–5.1)
POTASSIUM BLD-SCNC: 4 MMOL/L (ref 3.5–5.1)
POTASSIUM BLD-SCNC: 4.4 MMOL/L (ref 3.5–5.1)
POTASSIUM SERPL-SCNC: 3.5 MMOL/L (ref 3.5–5.1)
POTASSIUM SERPL-SCNC: 3.6 MMOL/L (ref 3.5–5.1)
POTASSIUM SERPL-SCNC: 4.5 MMOL/L (ref 3.5–5.1)
PROTHROMBIN TIME: 14.1 SEC (ref 11.7–14.5)
Q-T INTERVAL, ECG07: 450 MS
QRS DURATION, ECG06: 84 MS
QTC CALCULATION (BEZET), ECG08: 525 MS
RBC # BLD AUTO: 3.31 M/UL (ref 4.05–5.2)
SAO2 % BLD: 100 % (ref 95–98)
SAO2 % BLD: 85 % (ref 95–98)
SAO2 % BLD: 98 % (ref 95–98)
SERVICE CMNT-IMP: ABNORMAL
SODIUM BLD-SCNC: 137 MMOL/L (ref 136–145)
SODIUM BLD-SCNC: 138 MMOL/L (ref 136–145)
SODIUM BLD-SCNC: 139 MMOL/L (ref 136–145)
SODIUM BLD-SCNC: 139 MMOL/L (ref 136–145)
SODIUM BLD-SCNC: 140 MMOL/L (ref 136–145)
SODIUM BLD-SCNC: 142 MMOL/L (ref 136–145)
SODIUM SERPL-SCNC: 145 MMOL/L (ref 136–145)
SPECIMEN TYPE: ABNORMAL
VENTILATION MODE VENT: ABNORMAL
VENTRICULAR RATE, ECG03: 82 BPM
VT SETTING VENT: 500 ML
WBC # BLD AUTO: 13 K/UL (ref 4.3–11.1)

## 2019-09-23 PROCEDURE — 74011250636 HC RX REV CODE- 250/636: Performed by: THORACIC SURGERY (CARDIOTHORACIC VASCULAR SURGERY)

## 2019-09-23 PROCEDURE — 77030013292 HC BOWL MX PRSM J&J -A: Performed by: ANESTHESIOLOGY

## 2019-09-23 PROCEDURE — 77030012390 HC DRN CHST BTL GTNG -B: Performed by: THORACIC SURGERY (CARDIOTHORACIC VASCULAR SURGERY)

## 2019-09-23 PROCEDURE — 77030002970 HC SUT PLEDG TELE -A: Performed by: THORACIC SURGERY (CARDIOTHORACIC VASCULAR SURGERY)

## 2019-09-23 PROCEDURE — 77030018571 HC SUT PROL1 J&J -B: Performed by: THORACIC SURGERY (CARDIOTHORACIC VASCULAR SURGERY)

## 2019-09-23 PROCEDURE — 77030031139 HC SUT VCRL2 J&J -A: Performed by: THORACIC SURGERY (CARDIOTHORACIC VASCULAR SURGERY)

## 2019-09-23 PROCEDURE — 84132 ASSAY OF SERUM POTASSIUM: CPT

## 2019-09-23 PROCEDURE — 77030003034 HC SUT WRE CRD J&J -B: Performed by: THORACIC SURGERY (CARDIOTHORACIC VASCULAR SURGERY)

## 2019-09-23 PROCEDURE — 74011250636 HC RX REV CODE- 250/636: Performed by: ANESTHESIOLOGY

## 2019-09-23 PROCEDURE — 77030010514 HC APPL CLP LIG COVD -B: Performed by: THORACIC SURGERY (CARDIOTHORACIC VASCULAR SURGERY)

## 2019-09-23 PROCEDURE — 77030016564 HC BLD STRNL SAW4 CNMD -B: Performed by: THORACIC SURGERY (CARDIOTHORACIC VASCULAR SURGERY)

## 2019-09-23 PROCEDURE — 77030005537 HC CATH URETH BARD -A: Performed by: THORACIC SURGERY (CARDIOTHORACIC VASCULAR SURGERY)

## 2019-09-23 PROCEDURE — 77030025646 HC AUTOTRNSFUS KT TERU -C: Performed by: THORACIC SURGERY (CARDIOTHORACIC VASCULAR SURGERY)

## 2019-09-23 PROCEDURE — 77030013861 HC PNCH AORT CLNCUT QUES -B: Performed by: THORACIC SURGERY (CARDIOTHORACIC VASCULAR SURGERY)

## 2019-09-23 PROCEDURE — 77030039425 HC BLD LARYNG TRULITE DISP TELE -A: Performed by: ANESTHESIOLOGY

## 2019-09-23 PROCEDURE — 82947 ASSAY GLUCOSE BLOOD QUANT: CPT

## 2019-09-23 PROCEDURE — 06BQ0ZZ EXCISION OF LEFT SAPHENOUS VEIN, OPEN APPROACH: ICD-10-PCS | Performed by: THORACIC SURGERY (CARDIOTHORACIC VASCULAR SURGERY)

## 2019-09-23 PROCEDURE — 77030020407 HC IV BLD WRMR ST 3M -A: Performed by: ANESTHESIOLOGY

## 2019-09-23 PROCEDURE — 3E080GC INTRODUCTION OF OTHER THERAPEUTIC SUBSTANCE INTO HEART, OPEN APPROACH: ICD-10-PCS | Performed by: THORACIC SURGERY (CARDIOTHORACIC VASCULAR SURGERY)

## 2019-09-23 PROCEDURE — 77030009995: Performed by: THORACIC SURGERY (CARDIOTHORACIC VASCULAR SURGERY)

## 2019-09-23 PROCEDURE — 5A1221Z PERFORMANCE OF CARDIAC OUTPUT, CONTINUOUS: ICD-10-PCS | Performed by: THORACIC SURGERY (CARDIOTHORACIC VASCULAR SURGERY)

## 2019-09-23 PROCEDURE — P9047 ALBUMIN (HUMAN), 25%, 50ML: HCPCS

## 2019-09-23 PROCEDURE — 80048 BASIC METABOLIC PNL TOTAL CA: CPT

## 2019-09-23 PROCEDURE — 77030009355 HC CRDPLG DEL SET QUES -C: Performed by: THORACIC SURGERY (CARDIOTHORACIC VASCULAR SURGERY)

## 2019-09-23 PROCEDURE — 77030014007 HC SPNG HEMSTAT J&J -B: Performed by: THORACIC SURGERY (CARDIOTHORACIC VASCULAR SURGERY)

## 2019-09-23 PROCEDURE — 77010033711 HC HIGH FLOW OXYGEN

## 2019-09-23 PROCEDURE — 77030019908 HC STETH ESOPH SIMS -A: Performed by: ANESTHESIOLOGY

## 2019-09-23 PROCEDURE — 74011000250 HC RX REV CODE- 250

## 2019-09-23 PROCEDURE — 021109W BYPASS CORONARY ARTERY, TWO ARTERIES FROM AORTA WITH AUTOLOGOUS VENOUS TISSUE, OPEN APPROACH: ICD-10-PCS | Performed by: THORACIC SURGERY (CARDIOTHORACIC VASCULAR SURGERY)

## 2019-09-23 PROCEDURE — 74011000272 HC RX REV CODE- 272: Performed by: THORACIC SURGERY (CARDIOTHORACIC VASCULAR SURGERY)

## 2019-09-23 PROCEDURE — 77030010813: Performed by: THORACIC SURGERY (CARDIOTHORACIC VASCULAR SURGERY)

## 2019-09-23 PROCEDURE — 71045 X-RAY EXAM CHEST 1 VIEW: CPT

## 2019-09-23 PROCEDURE — 77030020782 HC GWN BAIR PAWS FLX 3M -B: Performed by: ANESTHESIOLOGY

## 2019-09-23 PROCEDURE — C1769 GUIDE WIRE: HCPCS | Performed by: THORACIC SURGERY (CARDIOTHORACIC VASCULAR SURGERY)

## 2019-09-23 PROCEDURE — 77030025869: Performed by: THORACIC SURGERY (CARDIOTHORACIC VASCULAR SURGERY)

## 2019-09-23 PROCEDURE — 36600 WITHDRAWAL OF ARTERIAL BLOOD: CPT

## 2019-09-23 PROCEDURE — 77030002996 HC SUT SLK J&J -A: Performed by: THORACIC SURGERY (CARDIOTHORACIC VASCULAR SURGERY)

## 2019-09-23 PROCEDURE — 74011250636 HC RX REV CODE- 250/636

## 2019-09-23 PROCEDURE — 76060000040 HC ANESTHESIA 4.5 TO 5 HR: Performed by: THORACIC SURGERY (CARDIOTHORACIC VASCULAR SURGERY)

## 2019-09-23 PROCEDURE — 74011636637 HC RX REV CODE- 636/637

## 2019-09-23 PROCEDURE — C1751 CATH, INF, PER/CENT/MIDLINE: HCPCS | Performed by: ANESTHESIOLOGY

## 2019-09-23 PROCEDURE — 77030006689 HC BLD OPHTH BVR BD -A: Performed by: THORACIC SURGERY (CARDIOTHORACIC VASCULAR SURGERY)

## 2019-09-23 PROCEDURE — 74011250637 HC RX REV CODE- 250/637: Performed by: THORACIC SURGERY (CARDIOTHORACIC VASCULAR SURGERY)

## 2019-09-23 PROCEDURE — 77030008771 HC TU NG SALEM SUMP -A: Performed by: ANESTHESIOLOGY

## 2019-09-23 PROCEDURE — 74011250637 HC RX REV CODE- 250/637: Performed by: PHYSICIAN ASSISTANT

## 2019-09-23 PROCEDURE — 77030002987 HC SUT PROL J&J -B: Performed by: THORACIC SURGERY (CARDIOTHORACIC VASCULAR SURGERY)

## 2019-09-23 PROCEDURE — 77030005401 HC CATH RAD ARRO -A: Performed by: ANESTHESIOLOGY

## 2019-09-23 PROCEDURE — 77030034888 HC SUT PROL 2 J&J -B: Performed by: THORACIC SURGERY (CARDIOTHORACIC VASCULAR SURGERY)

## 2019-09-23 PROCEDURE — 77030018547 HC SUT ETHBND1 J&J -B: Performed by: THORACIC SURGERY (CARDIOTHORACIC VASCULAR SURGERY)

## 2019-09-23 PROCEDURE — 85384 FIBRINOGEN ACTIVITY: CPT

## 2019-09-23 PROCEDURE — 74011000250 HC RX REV CODE- 250: Performed by: THORACIC SURGERY (CARDIOTHORACIC VASCULAR SURGERY)

## 2019-09-23 PROCEDURE — 77030037088 HC TUBE ENDOTRACH ORAL NSL COVD-A: Performed by: ANESTHESIOLOGY

## 2019-09-23 PROCEDURE — 77030018548 HC SUT ETHBND2 J&J -B: Performed by: THORACIC SURGERY (CARDIOTHORACIC VASCULAR SURGERY)

## 2019-09-23 PROCEDURE — 77030005518 HC CATH URETH FOL 2W BARD -B: Performed by: THORACIC SURGERY (CARDIOTHORACIC VASCULAR SURGERY)

## 2019-09-23 PROCEDURE — 77030010512 HC APPL CLP LIG J&J -C: Performed by: THORACIC SURGERY (CARDIOTHORACIC VASCULAR SURGERY)

## 2019-09-23 PROCEDURE — 77030003010 HC SUT SURG STL J&J -B: Performed by: THORACIC SURGERY (CARDIOTHORACIC VASCULAR SURGERY)

## 2019-09-23 PROCEDURE — 77030034927 HC PK PROC CPB INSPIRE PERF LIVA -F: Performed by: THORACIC SURGERY (CARDIOTHORACIC VASCULAR SURGERY)

## 2019-09-23 PROCEDURE — 85730 THROMBOPLASTIN TIME PARTIAL: CPT

## 2019-09-23 PROCEDURE — 74011000258 HC RX REV CODE- 258

## 2019-09-23 PROCEDURE — P9045 ALBUMIN (HUMAN), 5%, 250 ML: HCPCS | Performed by: THORACIC SURGERY (CARDIOTHORACIC VASCULAR SURGERY)

## 2019-09-23 PROCEDURE — 76010000155 HC AUTO TRANSFUSION/CELL SAVER: Performed by: THORACIC SURGERY (CARDIOTHORACIC VASCULAR SURGERY)

## 2019-09-23 PROCEDURE — 93005 ELECTROCARDIOGRAM TRACING: CPT | Performed by: THORACIC SURGERY (CARDIOTHORACIC VASCULAR SURGERY)

## 2019-09-23 PROCEDURE — 77030013794 HC KT TRNSDUC BLD EDWD -B: Performed by: ANESTHESIOLOGY

## 2019-09-23 PROCEDURE — 77030002966 HC SUT PDS J&J -A: Performed by: THORACIC SURGERY (CARDIOTHORACIC VASCULAR SURGERY)

## 2019-09-23 PROCEDURE — 77030025827 HC BG BLD DNR AUTLG MEDT -A: Performed by: THORACIC SURGERY (CARDIOTHORACIC VASCULAR SURGERY)

## 2019-09-23 PROCEDURE — 99223 1ST HOSP IP/OBS HIGH 75: CPT | Performed by: INTERNAL MEDICINE

## 2019-09-23 PROCEDURE — 74011250637 HC RX REV CODE- 250/637: Performed by: INTERNAL MEDICINE

## 2019-09-23 PROCEDURE — 77030020263 HC SOL INJ SOD CL0.9% LFCR 1000ML

## 2019-09-23 PROCEDURE — 86580 TB INTRADERMAL TEST: CPT | Performed by: THORACIC SURGERY (CARDIOTHORACIC VASCULAR SURGERY)

## 2019-09-23 PROCEDURE — 02100Z9 BYPASS CORONARY ARTERY, ONE ARTERY FROM LEFT INTERNAL MAMMARY, OPEN APPROACH: ICD-10-PCS | Performed by: THORACIC SURGERY (CARDIOTHORACIC VASCULAR SURGERY)

## 2019-09-23 PROCEDURE — C1729 CATH, DRAINAGE: HCPCS | Performed by: THORACIC SURGERY (CARDIOTHORACIC VASCULAR SURGERY)

## 2019-09-23 PROCEDURE — 77030010938 HC CLP LIG TELE -A: Performed by: THORACIC SURGERY (CARDIOTHORACIC VASCULAR SURGERY)

## 2019-09-23 PROCEDURE — 85610 PROTHROMBIN TIME: CPT

## 2019-09-23 PROCEDURE — 82803 BLOOD GASES ANY COMBINATION: CPT

## 2019-09-23 PROCEDURE — 77030002986 HC SUT PROL J&J -A: Performed by: THORACIC SURGERY (CARDIOTHORACIC VASCULAR SURGERY)

## 2019-09-23 PROCEDURE — 77030020751 HC FLTR TBNG TRNSFUS HAEM -A: Performed by: ANESTHESIOLOGY

## 2019-09-23 PROCEDURE — 77030016688: Performed by: THORACIC SURGERY (CARDIOTHORACIC VASCULAR SURGERY)

## 2019-09-23 PROCEDURE — 76010000199 HC CV SURG 4.5 TO 5 HR INTENSV-TIER 1: Performed by: THORACIC SURGERY (CARDIOTHORACIC VASCULAR SURGERY)

## 2019-09-23 PROCEDURE — 5A1223Z PERFORMANCE OF CARDIAC PACING, CONTINUOUS: ICD-10-PCS | Performed by: THORACIC SURGERY (CARDIOTHORACIC VASCULAR SURGERY)

## 2019-09-23 PROCEDURE — 77030002520 HC INSRT CLMP LATIS STLTH AMR -B: Performed by: THORACIC SURGERY (CARDIOTHORACIC VASCULAR SURGERY)

## 2019-09-23 PROCEDURE — 82962 GLUCOSE BLOOD TEST: CPT

## 2019-09-23 PROCEDURE — 77030013797 HC KT TRNSDUC PRSSR EDWD -A: Performed by: THORACIC SURGERY (CARDIOTHORACIC VASCULAR SURGERY)

## 2019-09-23 PROCEDURE — 83735 ASSAY OF MAGNESIUM: CPT

## 2019-09-23 PROCEDURE — 77030018836 HC SOL IRR NACL ICUM -A: Performed by: THORACIC SURGERY (CARDIOTHORACIC VASCULAR SURGERY)

## 2019-09-23 PROCEDURE — 85027 COMPLETE CBC AUTOMATED: CPT

## 2019-09-23 PROCEDURE — 65610000006 HC RM INTENSIVE CARE

## 2019-09-23 PROCEDURE — 74011250636 HC RX REV CODE- 250/636: Performed by: PHYSICIAN ASSISTANT

## 2019-09-23 PROCEDURE — 77030012890

## 2019-09-23 PROCEDURE — 74011000302 HC RX REV CODE- 302: Performed by: THORACIC SURGERY (CARDIOTHORACIC VASCULAR SURGERY)

## 2019-09-23 PROCEDURE — 77030018729 HC ELECTRD DEFIB PAD CARD -B: Performed by: THORACIC SURGERY (CARDIOTHORACIC VASCULAR SURGERY)

## 2019-09-23 PROCEDURE — 85018 HEMOGLOBIN: CPT

## 2019-09-23 PROCEDURE — 77030003037 HC SUT WRE STRNOTMY AEMC -B: Performed by: THORACIC SURGERY (CARDIOTHORACIC VASCULAR SURGERY)

## 2019-09-23 RX ORDER — DEXTROSE, SODIUM CHLORIDE, AND POTASSIUM CHLORIDE 5; .45; .15 G/100ML; G/100ML; G/100ML
25 INJECTION INTRAVENOUS CONTINUOUS
Status: DISCONTINUED | OUTPATIENT
Start: 2019-09-23 | End: 2019-09-24

## 2019-09-23 RX ORDER — LIDOCAINE HYDROCHLORIDE 10 MG/ML
0.1 INJECTION INFILTRATION; PERINEURAL AS NEEDED
Status: DISCONTINUED | OUTPATIENT
Start: 2019-09-23 | End: 2019-09-23 | Stop reason: HOSPADM

## 2019-09-23 RX ORDER — GUAIFENESIN 100 MG/5ML
81 LIQUID (ML) ORAL DAILY
Status: DISCONTINUED | OUTPATIENT
Start: 2019-09-24 | End: 2019-09-23

## 2019-09-23 RX ORDER — SODIUM CHLORIDE, SODIUM LACTATE, POTASSIUM CHLORIDE, CALCIUM CHLORIDE 600; 310; 30; 20 MG/100ML; MG/100ML; MG/100ML; MG/100ML
100 INJECTION, SOLUTION INTRAVENOUS CONTINUOUS
Status: DISCONTINUED | OUTPATIENT
Start: 2019-09-23 | End: 2019-09-23 | Stop reason: HOSPADM

## 2019-09-23 RX ORDER — SODIUM CHLORIDE, SODIUM LACTATE, POTASSIUM CHLORIDE, CALCIUM CHLORIDE 600; 310; 30; 20 MG/100ML; MG/100ML; MG/100ML; MG/100ML
INJECTION, SOLUTION INTRAVENOUS
Status: DISCONTINUED | OUTPATIENT
Start: 2019-09-23 | End: 2019-09-23 | Stop reason: HOSPADM

## 2019-09-23 RX ORDER — ATORVASTATIN CALCIUM 40 MG/1
80 TABLET, FILM COATED ORAL
Status: DISCONTINUED | OUTPATIENT
Start: 2019-09-23 | End: 2019-09-24

## 2019-09-23 RX ORDER — NALOXONE HYDROCHLORIDE 0.4 MG/ML
0.4 INJECTION, SOLUTION INTRAMUSCULAR; INTRAVENOUS; SUBCUTANEOUS AS NEEDED
Status: DISCONTINUED | OUTPATIENT
Start: 2019-09-23 | End: 2019-09-24

## 2019-09-23 RX ORDER — LIDOCAINE HYDROCHLORIDE 20 MG/ML
INJECTION, SOLUTION EPIDURAL; INFILTRATION; INTRACAUDAL; PERINEURAL AS NEEDED
Status: DISCONTINUED | OUTPATIENT
Start: 2019-09-23 | End: 2019-09-23 | Stop reason: HOSPADM

## 2019-09-23 RX ORDER — SODIUM CHLORIDE 0.9 % (FLUSH) 0.9 %
5-40 SYRINGE (ML) INJECTION EVERY 8 HOURS
Status: DISCONTINUED | OUTPATIENT
Start: 2019-09-23 | End: 2019-09-24

## 2019-09-23 RX ORDER — MUPIROCIN 20 MG/G
OINTMENT TOPICAL 2 TIMES DAILY
Status: DISCONTINUED | OUTPATIENT
Start: 2019-09-23 | End: 2019-09-24

## 2019-09-23 RX ORDER — POTASSIUM CHLORIDE 14.9 MG/ML
10 INJECTION INTRAVENOUS AS NEEDED
Status: DISCONTINUED | OUTPATIENT
Start: 2019-09-23 | End: 2019-09-24

## 2019-09-23 RX ORDER — PHENYLEPHRINE HCL IN 0.9% NACL 30MG/250ML
10-100 PLASTIC BAG, INJECTION (ML) INTRAVENOUS
Status: DISCONTINUED | OUTPATIENT
Start: 2019-09-23 | End: 2019-09-24

## 2019-09-23 RX ORDER — SODIUM BICARBONATE 84 MG/ML
50 INJECTION, SOLUTION INTRAVENOUS AS NEEDED
Status: DISCONTINUED | OUTPATIENT
Start: 2019-09-23 | End: 2019-09-24

## 2019-09-23 RX ORDER — NITROGLYCERIN 20 MG/100ML
10-100 INJECTION INTRAVENOUS
Status: DISCONTINUED | OUTPATIENT
Start: 2019-09-23 | End: 2019-09-24

## 2019-09-23 RX ORDER — DEXTROSE 50 % IN WATER (D50W) INTRAVENOUS SYRINGE
25 AS NEEDED
Status: DISCONTINUED | OUTPATIENT
Start: 2019-09-23 | End: 2019-09-24

## 2019-09-23 RX ORDER — LIDOCAINE HCL/PF 100 MG/5ML
50-100 SYRINGE (ML) INTRAVENOUS
Status: ACTIVE | OUTPATIENT
Start: 2019-09-23 | End: 2019-09-24

## 2019-09-23 RX ORDER — VECURONIUM BROMIDE FOR INJECTION 1 MG/ML
INJECTION, POWDER, LYOPHILIZED, FOR SOLUTION INTRAVENOUS AS NEEDED
Status: DISCONTINUED | OUTPATIENT
Start: 2019-09-23 | End: 2019-09-23 | Stop reason: HOSPADM

## 2019-09-23 RX ORDER — SODIUM CHLORIDE 9 MG/ML
25 INJECTION, SOLUTION INTRAVENOUS CONTINUOUS
Status: DISCONTINUED | OUTPATIENT
Start: 2019-09-23 | End: 2019-09-24

## 2019-09-23 RX ORDER — OXYCODONE AND ACETAMINOPHEN 5; 325 MG/1; MG/1
1 TABLET ORAL
Status: DISCONTINUED | OUTPATIENT
Start: 2019-09-23 | End: 2019-09-27 | Stop reason: HOSPADM

## 2019-09-23 RX ORDER — SODIUM CHLORIDE 0.9 % (FLUSH) 0.9 %
5-40 SYRINGE (ML) INJECTION AS NEEDED
Status: DISCONTINUED | OUTPATIENT
Start: 2019-09-23 | End: 2019-09-24

## 2019-09-23 RX ORDER — EPHEDRINE SULFATE 50 MG/ML
INJECTION, SOLUTION INTRAVENOUS AS NEEDED
Status: DISCONTINUED | OUTPATIENT
Start: 2019-09-23 | End: 2019-09-23 | Stop reason: HOSPADM

## 2019-09-23 RX ORDER — SODIUM CHLORIDE 9 MG/ML
INJECTION, SOLUTION INTRAVENOUS
Status: DISCONTINUED | OUTPATIENT
Start: 2019-09-23 | End: 2019-09-23 | Stop reason: HOSPADM

## 2019-09-23 RX ORDER — MIDAZOLAM HYDROCHLORIDE 1 MG/ML
INJECTION, SOLUTION INTRAMUSCULAR; INTRAVENOUS AS NEEDED
Status: DISCONTINUED | OUTPATIENT
Start: 2019-09-23 | End: 2019-09-23 | Stop reason: HOSPADM

## 2019-09-23 RX ORDER — EPINEPHRINE 1 MG/ML
INJECTION, SOLUTION, CONCENTRATE INTRAVENOUS AS NEEDED
Status: DISCONTINUED | OUTPATIENT
Start: 2019-09-23 | End: 2019-09-23 | Stop reason: HOSPADM

## 2019-09-23 RX ORDER — AMIODARONE HYDROCHLORIDE 200 MG/1
200 TABLET ORAL EVERY 12 HOURS
Status: DISCONTINUED | OUTPATIENT
Start: 2019-09-23 | End: 2019-09-24

## 2019-09-23 RX ORDER — PAPAVERINE HYDROCHLORIDE 30 MG/ML
INJECTION INTRAMUSCULAR; INTRAVENOUS AS NEEDED
Status: DISCONTINUED | OUTPATIENT
Start: 2019-09-23 | End: 2019-09-23 | Stop reason: HOSPADM

## 2019-09-23 RX ORDER — ETOMIDATE 2 MG/ML
INJECTION INTRAVENOUS AS NEEDED
Status: DISCONTINUED | OUTPATIENT
Start: 2019-09-23 | End: 2019-09-23 | Stop reason: HOSPADM

## 2019-09-23 RX ORDER — MIDAZOLAM HYDROCHLORIDE 1 MG/ML
2 INJECTION, SOLUTION INTRAMUSCULAR; INTRAVENOUS
Status: DISCONTINUED | OUTPATIENT
Start: 2019-09-23 | End: 2019-09-23 | Stop reason: HOSPADM

## 2019-09-23 RX ORDER — PROPOFOL 10 MG/ML
0-50 VIAL (ML) INTRAVENOUS
Status: DISCONTINUED | OUTPATIENT
Start: 2019-09-23 | End: 2019-09-24

## 2019-09-23 RX ORDER — ROCURONIUM BROMIDE 10 MG/ML
INJECTION, SOLUTION INTRAVENOUS AS NEEDED
Status: DISCONTINUED | OUTPATIENT
Start: 2019-09-23 | End: 2019-09-23 | Stop reason: HOSPADM

## 2019-09-23 RX ORDER — HEPARIN SODIUM 1000 [USP'U]/ML
INJECTION, SOLUTION INTRAVENOUS; SUBCUTANEOUS AS NEEDED
Status: DISCONTINUED | OUTPATIENT
Start: 2019-09-23 | End: 2019-09-23 | Stop reason: HOSPADM

## 2019-09-23 RX ORDER — ALBUMIN HUMAN 50 G/1000ML
SOLUTION INTRAVENOUS
Status: COMPLETED | OUTPATIENT
Start: 2019-09-23 | End: 2019-09-23

## 2019-09-23 RX ORDER — PROTAMINE SULFATE 10 MG/ML
INJECTION, SOLUTION INTRAVENOUS AS NEEDED
Status: DISCONTINUED | OUTPATIENT
Start: 2019-09-23 | End: 2019-09-23 | Stop reason: HOSPADM

## 2019-09-23 RX ORDER — NITROGLYCERIN 20 MG/100ML
INJECTION INTRAVENOUS
Status: DISCONTINUED | OUTPATIENT
Start: 2019-09-23 | End: 2019-09-23 | Stop reason: HOSPADM

## 2019-09-23 RX ORDER — SUFENTANIL CITRATE 50 UG/ML
INJECTION EPIDURAL; INTRAVENOUS AS NEEDED
Status: DISCONTINUED | OUTPATIENT
Start: 2019-09-23 | End: 2019-09-23 | Stop reason: HOSPADM

## 2019-09-23 RX ORDER — MIDAZOLAM HYDROCHLORIDE 1 MG/ML
1 INJECTION, SOLUTION INTRAMUSCULAR; INTRAVENOUS
Status: DISCONTINUED | OUTPATIENT
Start: 2019-09-23 | End: 2019-09-24

## 2019-09-23 RX ORDER — CHLORHEXIDINE GLUCONATE 1.2 MG/ML
10 RINSE ORAL 2 TIMES DAILY
Status: DISCONTINUED | OUTPATIENT
Start: 2019-09-23 | End: 2019-09-24

## 2019-09-23 RX ORDER — MORPHINE SULFATE 10 MG/ML
3-5 INJECTION, SOLUTION INTRAMUSCULAR; INTRAVENOUS
Status: DISCONTINUED | OUTPATIENT
Start: 2019-09-23 | End: 2019-09-24

## 2019-09-23 RX ORDER — FENTANYL CITRATE 50 UG/ML
100 INJECTION, SOLUTION INTRAMUSCULAR; INTRAVENOUS ONCE
Status: DISCONTINUED | OUTPATIENT
Start: 2019-09-23 | End: 2019-09-23 | Stop reason: HOSPADM

## 2019-09-23 RX ORDER — MAGNESIUM SULFATE 1 G/100ML
1 INJECTION INTRAVENOUS AS NEEDED
Status: DISCONTINUED | OUTPATIENT
Start: 2019-09-23 | End: 2019-09-24

## 2019-09-23 RX ORDER — CEFAZOLIN SODIUM/WATER 2 G/20 ML
2 SYRINGE (ML) INTRAVENOUS EVERY 8 HOURS
Status: COMPLETED | OUTPATIENT
Start: 2019-09-23 | End: 2019-09-24

## 2019-09-23 RX ORDER — METOPROLOL TARTRATE 25 MG/1
25 TABLET, FILM COATED ORAL EVERY 12 HOURS
Status: DISCONTINUED | OUTPATIENT
Start: 2019-09-24 | End: 2019-09-24

## 2019-09-23 RX ORDER — MIDAZOLAM HYDROCHLORIDE 1 MG/ML
2 INJECTION, SOLUTION INTRAMUSCULAR; INTRAVENOUS ONCE
Status: DISCONTINUED | OUTPATIENT
Start: 2019-09-23 | End: 2019-09-23 | Stop reason: HOSPADM

## 2019-09-23 RX ADMIN — SUFENTANIL CITRATE 50 MCG: 50 INJECTION EPIDURAL; INTRAVENOUS at 07:25

## 2019-09-23 RX ADMIN — POTASSIUM CHLORIDE 10 MEQ: 200 INJECTION, SOLUTION INTRAVENOUS at 19:52

## 2019-09-23 RX ADMIN — AMIODARONE HYDROCHLORIDE 200 MG: 200 TABLET ORAL at 22:07

## 2019-09-23 RX ADMIN — LIDOCAINE HYDROCHLORIDE 100 MG: 20 INJECTION, SOLUTION EPIDURAL; INFILTRATION; INTRACAUDAL; PERINEURAL at 07:25

## 2019-09-23 RX ADMIN — Medication 2 G: at 08:00

## 2019-09-23 RX ADMIN — CHLORHEXIDINE GLUCONATE 10 ML: 1.2 RINSE ORAL at 14:49

## 2019-09-23 RX ADMIN — SUFENTANIL CITRATE 25 MCG: 50 INJECTION EPIDURAL; INTRAVENOUS at 08:55

## 2019-09-23 RX ADMIN — MORPHINE SULFATE 3 MG: 10 INJECTION, SOLUTION INTRAMUSCULAR; INTRAVENOUS at 17:27

## 2019-09-23 RX ADMIN — Medication 10 ML: at 06:00

## 2019-09-23 RX ADMIN — DEXTROSE MONOHYDRATE, SODIUM CHLORIDE, AND POTASSIUM CHLORIDE 25 ML/HR: 50; 4.5; 1.49 INJECTION, SOLUTION INTRAVENOUS at 14:19

## 2019-09-23 RX ADMIN — EPINEPHRINE 0.01 MG: 1 INJECTION, SOLUTION, CONCENTRATE INTRAVENOUS at 07:27

## 2019-09-23 RX ADMIN — Medication 2 G: at 19:50

## 2019-09-23 RX ADMIN — SODIUM CHLORIDE, SODIUM LACTATE, POTASSIUM CHLORIDE, AND CALCIUM CHLORIDE 100 ML/HR: 600; 310; 30; 20 INJECTION, SOLUTION INTRAVENOUS at 05:54

## 2019-09-23 RX ADMIN — MIDAZOLAM HYDROCHLORIDE 3 MG: 1 INJECTION, SOLUTION INTRAMUSCULAR; INTRAVENOUS at 07:25

## 2019-09-23 RX ADMIN — ACETAMINOPHEN 650 MG: 325 TABLET, FILM COATED ORAL at 23:38

## 2019-09-23 RX ADMIN — EPHEDRINE SULFATE 10 MG: 50 INJECTION, SOLUTION INTRAVENOUS at 07:27

## 2019-09-23 RX ADMIN — POTASSIUM CHLORIDE 10 MEQ: 200 INJECTION, SOLUTION INTRAVENOUS at 18:21

## 2019-09-23 RX ADMIN — SUFENTANIL CITRATE 50 MCG: 50 INJECTION EPIDURAL; INTRAVENOUS at 09:35

## 2019-09-23 RX ADMIN — MUPIROCIN: 20 OINTMENT TOPICAL at 22:06

## 2019-09-23 RX ADMIN — ATORVASTATIN CALCIUM 80 MG: 40 TABLET, FILM COATED ORAL at 22:10

## 2019-09-23 RX ADMIN — METOPROLOL SUCCINATE 25 MG: 25 TABLET, EXTENDED RELEASE ORAL at 04:34

## 2019-09-23 RX ADMIN — VECURONIUM BROMIDE FOR INJECTION 4 MG: 1 INJECTION, POWDER, LYOPHILIZED, FOR SOLUTION INTRAVENOUS at 08:40

## 2019-09-23 RX ADMIN — Medication 10 ML: at 22:08

## 2019-09-23 RX ADMIN — MIDAZOLAM HYDROCHLORIDE 2 MG: 1 INJECTION, SOLUTION INTRAMUSCULAR; INTRAVENOUS at 07:15

## 2019-09-23 RX ADMIN — SODIUM CHLORIDE, SODIUM LACTATE, POTASSIUM CHLORIDE, CALCIUM CHLORIDE: 600; 310; 30; 20 INJECTION, SOLUTION INTRAVENOUS at 07:06

## 2019-09-23 RX ADMIN — NITROGLYCERIN 10 MCG/MIN: 20 INJECTION INTRAVENOUS at 07:55

## 2019-09-23 RX ADMIN — TUBERCULIN PURIFIED PROTEIN DERIVATIVE 5 UNITS: 5 INJECTION, SOLUTION INTRADERMAL at 20:00

## 2019-09-23 RX ADMIN — ROCURONIUM BROMIDE 50 MG: 10 INJECTION, SOLUTION INTRAVENOUS at 07:25

## 2019-09-23 RX ADMIN — MAGNESIUM SULFATE HEPTAHYDRATE 1 G: 1 INJECTION, SOLUTION INTRAVENOUS at 23:37

## 2019-09-23 RX ADMIN — SODIUM CHLORIDE: 9 INJECTION, SOLUTION INTRAVENOUS at 07:43

## 2019-09-23 RX ADMIN — ROCURONIUM BROMIDE 50 MG: 10 INJECTION, SOLUTION INTRAVENOUS at 10:27

## 2019-09-23 RX ADMIN — HEPARIN SODIUM 25000 UNITS: 1000 INJECTION, SOLUTION INTRAVENOUS; SUBCUTANEOUS at 09:26

## 2019-09-23 RX ADMIN — AMIODARONE HYDROCHLORIDE 600 MG: 200 TABLET ORAL at 04:33

## 2019-09-23 RX ADMIN — SODIUM CHLORIDE, SODIUM LACTATE, POTASSIUM CHLORIDE, CALCIUM CHLORIDE: 600; 310; 30; 20 INJECTION, SOLUTION INTRAVENOUS at 07:43

## 2019-09-23 RX ADMIN — SODIUM CHLORIDE 25 ML/HR: 900 INJECTION, SOLUTION INTRAVENOUS at 12:00

## 2019-09-23 RX ADMIN — ETOMIDATE 14 MG: 2 INJECTION INTRAVENOUS at 07:25

## 2019-09-23 RX ADMIN — MUPIROCIN: 20 OINTMENT TOPICAL at 05:00

## 2019-09-23 RX ADMIN — MIDAZOLAM HYDROCHLORIDE 5 MG: 1 INJECTION, SOLUTION INTRAMUSCULAR; INTRAVENOUS at 10:27

## 2019-09-23 RX ADMIN — PANTOPRAZOLE SODIUM 40 MG: 40 TABLET, DELAYED RELEASE ORAL at 04:34

## 2019-09-23 RX ADMIN — Medication 2 G: at 11:28

## 2019-09-23 RX ADMIN — SUFENTANIL CITRATE 25 MCG: 50 INJECTION EPIDURAL; INTRAVENOUS at 08:53

## 2019-09-23 RX ADMIN — PROTAMINE SULFATE 250 MG: 10 INJECTION, SOLUTION INTRAVENOUS at 10:58

## 2019-09-23 RX ADMIN — Medication 10 ML: at 14:50

## 2019-09-23 NOTE — ANESTHESIA PROCEDURE NOTES
Central Line Placement    Start time: 9/23/2019 7:35 AM  End time: 9/23/2019 7:43 AM  Performed by: Sondra Dorman MD  Authorized by: Sondra Dorman MD     Indications: vascular access and central pressure monitoring  Preanesthetic Checklist: patient identified, risks and benefits discussed, anesthesia consent, site marked, patient being monitored and timeout performed    Timeout Time: 07:35       Pre-procedure: All elements of maximal sterile barrier technique followed? Yes    2% Chlorhexidine for cutaneous antisepsis, Hand hygiene performed prior to catheter insertion and Ultrasound guidance    Sterile Ultrasound Technique followed?: Yes        Ultrasound Image Stored? Image stored    Procedure:   Prep:  Chlorhexidine  Location:  Internal jugular  Orientation:  Right  Patient position:  Trendelenburg    Catheter size:  9 Fr    Number of attempts:  1  Successful placement: Yes      Assessment:   Post-procedure:  Catheter secured and sterile dressing applied  Assessment:  Blood return through all ports, free fluid flow and guidewire removal verified  Insertion:  Uncomplicated  Patient tolerance:  Patient tolerated the procedure well with no immediate complications  Anesthesiology Procedure Note    Risks, benefits, and alternatives of central line placement discussed. Patient understands and elects to proceed. right internal jugular Venous approach prepped with chlorhexidine. Ultrasound used to define anatomy and verify vessel patency. 7-Step Sterility Protocol followed. Seldinger technique with CVP identification. Pulmonary artery catheter placed without complications.

## 2019-09-23 NOTE — PROGRESS NOTES
TRANSFER - OUT REPORT:    Verbal report given to Laurelιdayana Correia RN(name) on [address]  being transferred to pre-op(unit) for ordered procedure       Report consisted of patients Situation, Background, Assessment and   Recommendations(SBAR). Information from the following report(s) SBAR and Cardiac Rhythm NSR was reviewed with the receiving nurse. Lines:   Peripheral IV 09/16/19 Anterior;Distal;Left Forearm (Active)   Site Assessment Clean, dry, & intact 9/23/2019  3:20 AM   Phlebitis Assessment 0 9/23/2019  3:20 AM   Infiltration Assessment 0 9/23/2019  3:20 AM   Dressing Status Clean, dry, & intact 9/23/2019  3:20 AM   Dressing Type Transparent;Tape 9/23/2019  3:20 AM   Hub Color/Line Status Pink; Infusing 9/23/2019  3:20 AM   Alcohol Cap Used No 9/22/2019  3:47 PM        Opportunity for questions and clarification was provided.       Patient transported with:   Registered Nurse

## 2019-09-23 NOTE — PROGRESS NOTES
Follow up visit to build relationship of connectedness, care & emotional / spiritual support.      ______    active listening/ guided questions to understand pt's spiritual needs    ______    exploration of hope & the presence of God to normalize struggles as Holy, reframe, introduce coping skills    ___x___   prayer / blessing of pt & struggles, to convey peace & lessen anxiety     Additional  Comments / interventions:       Pt's communication is limited to nodding / shaking her head. Pt indicated she is feeling better & that she continues to need prayers.     Waqas Warner MDiv, ThM, PhD  Shawn Dewey

## 2019-09-23 NOTE — ANESTHESIA PROCEDURE NOTES
LISA  Date/Time: 9/23/2019 8:11 AM  Ordering Provider: William Nicole MD    Procedure Details: probe placement, image aquisition & interpretation    07:55  Risks and benefits discussed with the patient and plans are to proceed    Procedure Note    Performed by: Evin Giraldo MD  Authorized by: Evin Giraldo MD       Indications: assessment of ascending aorta and assessment of surgical repair  Modalities: CF, 2D  Probe Type: biplane  Insertion: atraumatic  Patient Status: intubated and sedated    Echocardiographic and Doppler Measurements   Aorta  Size  Diam(cm)  Dissection PlaqueThick(mm)  Plaque Mobile    Ascending normal        Arch normal        Descending normal              Valves  Annulus  Stenosis  Area/Grad  Regurg  Leaflet   Morph  Leaflet   Motion    Aortic normal none  0 normal normal    Mitral normal none  1+ normal normal    Tricuspid normal none   normal normal          Atria  Size  SEC (smoke)  Thrombus  Tumor  Device    Rt Atrium normal        Lt Atrium normal         Interatrial Septum Morphology: normal    Interventricular Septum Morphology: normal    Ventricle  Cavity Size  Cavity Dimension Hypertrophy  Thrombus  Gloal FXN  EF    RV normal  No no normal     LV normal   No normal        Regional Function  (1 = normal, 2 = mildly hypokinetic, 3 = severely hypokinetic, 4 = akinetic, 5 = dyskinetic) LAV - Long [place] View   ME LAV = 0  ME LAV = 90  ME LAV = 130                                     Pericardium: normal    Post Intervention Follow-up Study  Ventricular Global Function: improved  Ventricular Regional Function: improved     Valve  Function  Regurgitation  Area    Aortic no change      Mitral no change      Tricuspid no change      Prosthetic        Complications: None

## 2019-09-23 NOTE — DIABETES MGMT
Improved control as blood glucose ranged 119-194 yesterday with patient receiving Lantus 50 units, Humalog 51 units, and pioglitazone 30 mg. Blood glucose 220 this morning. Patient to have CABG today. Will continue to follow along post op.

## 2019-09-23 NOTE — PROGRESS NOTES
09/23/19 1153   Patient Observations   Pulse (Heart Rate) 82   Resp Rate 16   O2 Sat (%) 100 %   Airway - Endotracheal Tube 09/23/19 Oral   Placement Date/Time: 09/23/19 0725   Number of Attempts: 1  Inserted By: Ted Martinez  Location: Oral  Placement Verified: BBS;EtCO2  Airway Types: Endotracheal, cuffed  Airway Tube Size: 7 mm  DL Glottic View: 1  Technique: Direct Laryngoscopy;Stylet  Kurt. ..    Insertion Depth (cm) 24 cm   Line Ady Lips   Side Secured Device   Cuff Pressure 30 cmH20   Respiratory   Respiratory (WDL) X   Ventilator Initiate/Discontinue   Ventilator Initiate Yes   Bio-Med ID #   (pre use done)   Vent Settings   FIO2 (%) 60 %   CMV Rate Set 16   Vt Set (ml) 500 ml   PEEP/VENT (cm H2O) 8 cm H20   I:E Ratio 1:2.78   Insp Time (sec) 1 sec   Insp Rise Time (sec) 0.1   Flow Trigger 5   Ventilator Measurements   Resp Rate Observed 16   Vt Exhaled (Machine Breath) (ml) 535 ml   Ve Observed (l/min) 8 l/min   PIP Observed (cm H2O) 24 cm H2O   Plateau Pressure (cm H2O) 21 cm H2O   MAP (cm H2O) 12   I:E Ratio Actual 1:2.7   Dynamic Compliance (ml/cm H20) 32.5 ml/cm H20   Safety & Alarms   Pressure Max 50 cm H2O   Pressure Min 2 cm H2O   Ve Min 2   Ve Max 22   RR Min 5   RR Max 50   Ambu Bag Yes   Ambu Mask Yes   Vent Method/Mode   Ventilation Method Conventional   Ventilator Mode Automode;PRVC   Ventilator Mode ID 20

## 2019-09-23 NOTE — PROGRESS NOTES
Admit Date:  2019  7:09 AM   Name:  Charlene Bone   Age:  75 y.o.  :  1945   MRN:  [id number]       Subjective:   Patient is a 65yo F with hx uncontrolled DM, HTN, CAD, and remote TIA admitted for cath, found to have severe multivessel disease and now pending CABG after plavix washout.     19: Pt sleeping when I stopped by. Fasting blood sugar today at 5:51 AM was 299. When woken up for the exam she said she wasn't \"in the mood\" to answer questions and wanted to go back to sleep. She is aware of CABG next week following Plavix washout. ROS:  Limited due to patient cooperation. Constitutional: (-) fever, chills, HA  Cardiology: (-) chest pain, palpitations, leg swelling  Pulmonology: (-) SOB, cough, wheezing  GI: (-) nausea, vomiting      Objective:     Patient Vitals for the past 24 hrs:   Temp Pulse Resp BP SpO2   19 0532 98.5 °F (36.9 °C) 65 16 113/56 98 %   19 0436 98.4 °F (36.9 °C) 67  135/58    19 2307 98.1 °F (36.7 °C) 77 18 136/63 95 %   19 1922 98.1 °F (36.7 °C) 74 18 118/56 97 %   19 1543 98.4 °F (36.9 °C) 63 18 111/54 100 %   19 1151  61  134/63    19 1148 97.1 °F (36.2 °C) 61 18 134/63 100 %   19 0728 98.7 °F (37.1 °C) 69 18 129/58 99 %     Oxygen Therapy  O2 Sat (%): 98 % (19 0532)  Pulse via Oximetry: 75 beats per minute (19 2307)  O2 Device: Nasal cannula (19 0540)  O2 Flow Rate (L/min): 3 l/min (19 0540)    Intake/Output Summary (Last 24 hours) at 2019 0611  Last data filed at 2019 0436  Gross per 24 hour   Intake 188.33 ml   Output 500 ml   Net -311.67 ml         Physical Examination (somewhat limited due to pt cooperation):  Physical Exam   Constitutional: She is well-developed, well-nourished, and in no distress. HENT:   Head: Normocephalic and atraumatic. Neck: No JVD present. No tracheal deviation present.    Cardiovascular: Normal rate, regular rhythm, normal heart sounds and intact distal pulses. Exam reveals no gallop and no friction rub. No murmur heard. Pulmonary/Chest: Effort normal and breath sounds normal. No stridor. No respiratory distress. She has no wheezes. She has no rales. Abdominal: Soft. Bowel sounds are normal.   Musculoskeletal: She exhibits no edema, tenderness or deformity. Neurological: She is alert. Skin: Skin is warm and dry. No rash noted. Psychiatric: Memory and affect normal.       Data Review:  Results for Yousif Veloz (MRN [id number]) as of 9/20/2019 06:46   Ref. Range 9/20/2019 05:31   WBC Latest Ref Range: 4.3 - 11.1 K/uL 6.7   RBC Latest Ref Range: 4.05 - 5.2 M/uL 3.88 (L)   HGB Latest Ref Range: 11.7 - 15.4 g/dL 12.8   HCT Latest Ref Range: 35.8 - 46.3 % 39.4   MCV Latest Ref Range: 79.6 - 97.8 .5 (H)   MCH Latest Ref Range: 26.1 - 32.9 PG 33.0 (H)   MCHC Latest Ref Range: 31.4 - 35.0 g/dL 32.5   RDW Latest Ref Range: 11.9 - 14.6 % 12.7   PLATELET Latest Ref Range: 150 - 450 K/uL 223   MPV Latest Ref Range: 9.4 - 12.3 FL 11.2   ABSOLUTE NRBC Latest Ref Range: 0.0 - 0.2 K/uL 0.00   Results for Yousif Veloz (MRN [id number]) as of 9/20/2019 06:46   Ref.  Range 9/20/2019 05:31   aPTT Latest Ref Range: 24.7 - 39.8 .8 (HH)          Current Meds:  Current Facility-Administered Medications   Medication Dose Route Frequency    lidocaine (XYLOCAINE) 10 mg/mL (1 %) injection 0.1 mL  0.1 mL SubCUTAneous PRN    lactated Ringers infusion  100 mL/hr IntraVENous CONTINUOUS    fentaNYL citrate (PF) injection 100 mcg  100 mcg IntraVENous ONCE    midazolam (VERSED) injection 2 mg  2 mg IntraVENous ONCE PRN    midazolam (VERSED) injection 2 mg  2 mg IntraVENous ONCE    ceFAZolin (ANCEF) 2 g/20 mL in sterile water IV syringe  2 g IntraVENous ON CALL TO OR    alum-mag hydroxide-simeth (MYLANTA) oral suspension 30 mL  30 mL Oral Q4H PRN    EPINEPHrine (ADRENALIN) 4 mg in 0.9% sodium chloride 250 mL infusion  1-10 mcg/min IntraVENous TITRATE    heparin 10,000 units in NS 1000 ml irrigation  1,000 mL Irrigation ONCE    insulin regular (NOVOLIN R, HUMULIN R) 100 Units in 0.9% sodium chloride 100 mL infusion  1-10 Units/hr IntraVENous TITRATE    aminocaproic acid (AMICAR) 5 g in 0.9% sodium chloride 250 mL infusion  5 g IntraVENous ONCE    aminocaproic acid (AMICAR) 10 g in 0.9% sodium chloride 250 mL infusion  10 g IntraVENous ONCE    heparin (porcine) 25,000 Units in 0.9% sodium chloride 500 mL infusion  12-25 Units/kg/hr IntraVENous CONTINUOUS    insulin glargine (LANTUS) injection 50 Units  50 Units SubCUTAneous QHS    insulin lispro (HUMALOG) injection 15 Units  15 Units SubCUTAneous TIDAC    magnesium oxide (MAG-OX) tablet 400 mg  400 mg Oral DAILY    mupirocin (BACTROBAN) 2 % ointment   Both Nostrils BID    diphenhydrAMINE (BENADRYL) capsule 25 mg  25 mg Oral Q6H PRN    saline peripheral flush soln 5 mL  5 mL InterCATHeter PRN    sodium chloride (NS) flush 5-40 mL  5-40 mL IntraVENous Q8H    sodium chloride (NS) flush 5-40 mL  5-40 mL IntraVENous PRN    acetaminophen (TYLENOL) tablet 650 mg  650 mg Oral Q4H PRN    HYDROcodone-acetaminophen (NORCO) 5-325 mg per tablet 1 Tab  1 Tab Oral Q4H PRN    morphine injection 1 mg  1 mg IntraVENous Q4H PRN    naloxone (NARCAN) injection 0.4 mg  0.4 mg IntraVENous PRN    amLODIPine (NORVASC) tablet 2.5 mg  2.5 mg Oral DAILY    atorvastatin (LIPITOR) tablet 40 mg  40 mg Oral DAILY    cyclobenzaprine (FLEXERIL) tablet 10 mg  10 mg Oral TID PRN    metoprolol succinate (TOPROL-XL) XL tablet 25 mg  25 mg Oral DAILY    montelukast (SINGULAIR) tablet 10 mg  10 mg Oral DAILY    pantoprazole (PROTONIX) tablet 40 mg  40 mg Oral ACB    pioglitazone (ACTOS) tablet 30 mg  30 mg Oral DAILY    influenza vaccine 2019-20 (6 mos+)(PF) (FLUARIX/FLULAVAL/FLUZONE QUAD) injection 0.5 mL  0.5 mL IntraMUSCular PRIOR TO DISCHARGE    insulin lispro (HUMALOG) injection   SubCUTAneous AC&HS Diet:  DIET NUTRITIONAL SUPPLEMENTS  DIET NPO    No new imaging or testing. Assessment and Plan:       CAD  - CABG surgery planned for Monday after Plavix washout.  - Continue cardiac monitoring  - The current medical regimen is effective;  continue present plan and medications. HTN  - w/in target range. The current medical regimen is effective;  continue present plan and medications. HLD  - Run lipid panel if needed  - Have followed by PCP    Uncontrolled DM2  - Allow hospitalist and PCP to manage.      Anticipate cabg today    Adri Marte MD

## 2019-09-23 NOTE — PROGRESS NOTES
Respiratory Mechanics completed and are as follows:  Weaning Parameters  Spontaneous Breathing Trial Complete: Yes  Resp Rate Observed: 18  Ve: 5.4  VT: 300  RSBI: 60  NIF: -27  Chu Agitation Sedation Scale (RASS): Moderate sedation  Patient extubated to 40L 21% HHF NC. Patient is able to communicate and is negative for stridor. Breath sounds are clear and diminished. No complications with extubation.      Ira Ramos, RT

## 2019-09-23 NOTE — CONSULTS
Cardiovascular ICU Consult Note: 9/23/2019  Adrianna García  Admission Date: 9/16/2019     The patient's chart is reviewed and the patient is discussed with the staff. Subjective:     Adrianna García is a 64yoF with h/o is seen at the request of Dr. Rikki Hdz for respiratory management status post CABG x 3 . Patient had  Currently is sedated in CV-ICU and orally intubated receiving  mechanical ventilation. She had normal PFTs preoperatively, though chart reports h/o COPD, nephrolithiasis, DM2, GERD, arthritis, hypothyroidism. No difficulty with intubation was reported at nursing signout from anesthesia. We have been asked to see in the CV-ICU for mechanical ventilation management and weaning. Prior to Admission Medications   Prescriptions Last Dose Informant Patient Reported? Taking? MULTIVITAMIN (MULTIPLE VITAMIN PO) 9/15/2019 at Unknown time  Yes Yes   Sig: Take 2 Tabs by mouth daily. acetaminophen (TYLENOL) 500 mg tablet Unknown at Unknown time  Yes No   Sig: Take 1,000 mg by mouth two (2) times a day. amLODIPine (NORVASC) 2.5 mg tablet 9/15/2019 at Unknown time  No Yes   Sig: Take 1 Tab by mouth daily. atorvastatin (LIPITOR) 40 mg tablet 9/15/2019 at Unknown time  No Yes   Sig: Take 1 Tab by mouth daily. biotin 1 mg tab 9/15/2019 at Unknown time  Yes Yes   Sig: Take  by mouth daily. clopidogrel (PLAVIX) 75 mg tab 9/16/2019 at 0756  No Yes   Sig: Take 1 Tab by mouth daily. cyclobenzaprine (FLEXERIL) 5 mg tablet Unknown at Unknown time  No No   Sig: Take 2 Tabs by mouth three (3) times daily as needed for Muscle Spasm(s). diphenhydrAMINE (BENADRYL) 25 mg tablet 9/15/2019 at Unknown time  Yes Yes   Sig: Take 50 mg by mouth two (2) times a day. ferrous sulfate 325 mg (65 mg iron) tablet 9/15/2019 at Unknown time  Yes Yes   Sig: Take 65 mg by mouth Daily (before breakfast).    insulin degludec (TRESIBA FLEXTOUCH U-100) 100 unit/mL (3 mL) inpn 9/15/2019 at Unknown time  No Yes   Sig: Use 26 units QHS   metFORMIN (GLUCOPHAGE) 500 mg tablet 9/15/2019 at Unknown time  No Yes   Sig: TAKE TWO TABLETS BY MOUTH TWICE DAILY WITH MEALS   metoprolol succinate (TOPROL XL) 25 mg XL tablet 9/15/2019 at Unknown time  No Yes   Sig: Take 1 Tab by mouth daily. montelukast (SINGULAIR) 10 mg tablet 9/15/2019 at Unknown time  No Yes   Sig: Take 1 Tab by mouth daily. omeprazole (PRILOSEC) 40 mg capsule 9/15/2019 at Unknown time  No Yes   Sig: Take 1 Cap by mouth daily. pioglitazone (ACTOS) 30 mg tablet 9/15/2019 at Unknown time  No Yes   Sig: TAKE ONE TABLET BY MOUTH EVERY DAY      Facility-Administered Medications: None       Review of Systems  Unable to obtain due to patients intubated/sedated state.     Past Medical History:   Diagnosis Date    Anxiety and depression     Arthritis     CAD (coronary artery disease) 9/16/2019    Chest discomfort     Apr 2014 - Lexiscan MPI - normal EKG, perfusion images and LVEF    Chronic back pain     [facility]     DM2 (diabetes mellitus, type 2) ([facility] [address] 6/21/2012    Frequent UTI     GERD (gastroesophageal reflux disease)     H/O seasonal allergies     Heart murmur     Hiatal hernia     History of kidney stones     HLD (hyperlipidemia) 6/21/2012    HTN (hypertension) 6/21/2012    Hypothyroidism     Psychiatric disorder     Varicose veins 6/21/2012     Past Surgical History:   Procedure Laterality Date    ENDOSCOPY, COLON, DIAGNOSTIC  2011    two polyps    HX APPENDECTOMY  1976    HX BREAST BIOPSY      HX CATARACT REMOVAL  2002 and 2006    bilateral    HX CHOLECYSTECTOMY  1997    HX CYST REMOVAL  1973    wrist    HX GYN  1997    vaginal repair    HX HEART CATHETERIZATION  2007    left side    HX HERNIA REPAIR  1997    HX HYSTERECTOMY  1982    HX MOHS PROCEDURES      HX ORTHOPAEDIC  1985    ankle    HX TUBAL LIGATION  1976    bilateral    HX UROLOGICAL  1997    bladder tack     Social History     Socioeconomic History    Marital status:      Spouse name: Not on file    Number of children: Not on file    Years of education: Not on file    Highest education level: Not on file   Occupational History    Not on file   Social Needs    Financial resource strain: Not on file    Food insecurity:     Worry: Not on file     Inability: Not on file    Transportation needs:     Medical: Not on file     Non-medical: Not on file   Tobacco Use    Smoking status: Passive Smoke Exposure - Never Smoker    Smokeless tobacco: Never Used   Substance and Sexual Activity    Alcohol use: No     Alcohol/week: 0.0 standard drinks    Drug use: No    Sexual activity: Not on file   Lifestyle    Physical activity:     Days per week: Not on file     Minutes per session: Not on file    Stress: Not on file   Relationships    Social connections:     Talks on phone: Not on file     Gets together: Not on file     Attends Faith service: Not on file     Active member of club or organization: Not on file     Attends meetings of clubs or organizations: Not on file     Relationship status: Not on file    Intimate partner violence:     Fear of current or ex partner: Not on file     Emotionally abused: Not on file     Physically abused: Not on file     Forced sexual activity: Not on file   Other Topics Concern    Not on file   Social History Narrative    Not on file     Family History   Problem Relation Age of Onset    Diabetes Mother     Colon Polyps Mother     Coronary Artery Disease Father     Diabetes Sister     Coronary Artery Disease Sister     Coronary Artery Disease Brother     Diabetes Brother     Breast Cancer Paternal Aunt 67     Allergies   Allergen Reactions    Aspirin Itching    Codeine Itching and Swelling    Keflex [Cephalexin] Rash    Pcn [Penicillins] Rash    Sulfa Dyne Itching       Current Facility-Administered Medications   Medication Dose Route Frequency    0.9% sodium chloride infusion  25 mL/hr IntraVENous CONTINUOUS    dextrose 5% - 0.45% NaCl with KCl 20 mEq/L infusion  25 mL/hr IntraVENous CONTINUOUS    sodium chloride (NS) flush 5-40 mL  5-40 mL IntraVENous Q8H    sodium chloride (NS) flush 5-40 mL  5-40 mL IntraVENous PRN    oxyCODONE-acetaminophen (PERCOCET) 5-325 mg per tablet 1 Tab  1 Tab Oral Q4H PRN    morphine 10 mg/ml injection 3-5 mg  3-5 mg IntraVENous Q1H PRN    naloxone (NARCAN) injection 0.4 mg  0.4 mg IntraVENous PRN    mupirocin (BACTROBAN) 2 % ointment   Both Nostrils BID    ceFAZolin (ANCEF) 2 g/20 mL in sterile water IV syringe  2 g IntraVENous Q8H    sodium bicarbonate (8.4%) injection 50 mEq  50 mEq IntraVENous PRN    EPINEPHrine (ADRENALIN) 4 mg in 0.9% sodium chloride 250 mL infusion  0.05-0.1 mcg/kg/min IntraVENous TITRATE    nitroglycerin (Tridil) 200 mcg/ml infusion   mcg/min IntraVENous TITRATE    PHENYLephrine (JOHNATHAN-SYNEPHRINE) 30 mg in 0.9% sodium chloride 250 mL infusion   mcg/min IntraVENous TITRATE    lidocaine (PF) (XYLOCAINE) 100 mg/5 mL (2 %) injection syringe  mg   mg IntraVENous ONCE PRN    amiodarone (CORDARONE) tablet 200 mg  200 mg Oral Q12H    [START ON 9/24/2019] metoprolol tartrate (LOPRESSOR) tablet 25 mg  25 mg Oral Q12H    atorvastatin (LIPITOR) tablet 80 mg  80 mg Oral QHS    insulin regular (NOVOLIN R, HUMULIN R) 100 Units in 0.9% sodium chloride 100 mL infusion  1 Units/hr IntraVENous TITRATE    dextrose (D50W) injection syrg 12.5 g  25 mL IntraVENous PRN    [START ON 9/24/2019] aspirin chewable tablet 81 mg  81 mg Oral DAILY    magnesium sulfate 1 g/100 ml IVPB (premix or compounded)  1 g IntraVENous PRN    potassium chloride 10 mEq in 50 ml IVPB  10 mEq IntraVENous PRN    midazolam (VERSED) injection 1 mg  1 mg IntraVENous Q1H PRN    propofol (DIPRIVAN) infusion  0-50 mcg/kg/min IntraVENous TITRATE    meperidine (DEMEROL) injection 25 mg  25 mg IntraVENous Q1H PRN    chlorhexidine (PERIDEX) 0.12 % mouthwash 10 mL  10 mL Oral BID    tuberculin injection 5 Units  5 Units IntraDERMal ONCE    alum-mag hydroxide-simeth (MYLANTA) oral suspension 30 mL  30 mL Oral Q4H PRN    heparin (porcine) 25,000 Units in 0.9% sodium chloride 500 mL infusion  12-25 Units/kg/hr IntraVENous CONTINUOUS    insulin glargine (LANTUS) injection 50 Units  50 Units SubCUTAneous QHS    insulin lispro (HUMALOG) injection 15 Units  15 Units SubCUTAneous TIDAC    magnesium oxide (MAG-OX) tablet 400 mg  400 mg Oral DAILY    diphenhydrAMINE (BENADRYL) capsule 25 mg  25 mg Oral Q6H PRN    acetaminophen (TYLENOL) tablet 650 mg  650 mg Oral Q4H PRN    amLODIPine (NORVASC) tablet 2.5 mg  2.5 mg Oral DAILY    cyclobenzaprine (FLEXERIL) tablet 10 mg  10 mg Oral TID PRN    montelukast (SINGULAIR) tablet 10 mg  10 mg Oral DAILY    pantoprazole (PROTONIX) tablet 40 mg  40 mg Oral ACB    pioglitazone (ACTOS) tablet 30 mg  30 mg Oral DAILY    influenza vaccine 2019-20 (6 mos+)(PF) (FLUARIX/FLULAVAL/FLUZONE QUAD) injection 0.5 mL  0.5 mL IntraMUSCular PRIOR TO DISCHARGE    insulin lispro (HUMALOG) injection   SubCUTAneous AC&HS         Objective:     Vitals:    09/23/19 1200 09/23/19 1205 09/23/19 1210 09/23/19 1215   BP:       Pulse: 83 83 80 82   Resp: 16 12 (!) 0 16   Temp:       SpO2: 100% 100% 100% 99%   Weight:       Height:           Intake and Output:   09/21 1901 - 09/23 0700  In: 458.3 [P.O.:270; I.V.:188.3]  Out: 1600 [Urine:1600]  09/23 0701 - 09/23 1900  In: 1870 [I.V.:1500]  Out: 500 [Urine:500]    Physical Exam:          Constitutional:  Sedated, orally intubated and mechanically ventilated. EENMT:  Sclera clear, pupils equal, oral mucosa moist and orally intubated  Respiratory: CTAB  Cardiovascular:  RRR with no M,G,R;  Gastrointestinal:  soft; no bowel sounds present  Musculoskeletal:  warm with no cyanosis, no lower extremity edema. Jefferson site left leg with ace wrap.   Sedated with no movements. SKIN:  no jaundice or ecchymosis   Neurologic:  sedated but no gross neuro deficits  Psychiatric:  sedated and unable to assess at this time    CXR:        LINES:  ETT, ventura, swan sonya, arterial line, chest tubes times 2 in epigastric area without air leak. DRIPS:   NTG      Ventilator Settings  Mode FIO2 Rate Tidal Volume Pressure PEEP   Automode, PRVC  60 %    500 ml     8 cm H20      Peak airway pressure: 24 cm H2O   Minute ventilation: 8 l/min     ABG:   Recent Labs     09/23/19  1211 09/23/19  1113 09/23/19  1035   PHI 7.425 7.358 7.417   PCO2I 33.8* 46.3* 38.8   PO2I 110* 423* 224*   HCO3I 22.2 26.1* 25.0        LAB  Recent Labs     09/22/19  0401   WBC 7.1   HGB 13.3   HCT 39.8        Recent Labs     09/21/19  0515      K 3.6   *   CO2 29   *   BUN 10   CREA 0.42*   CA 8.8     No results for input(s): LCAD, LAC in the last 72 hours. Assessment and Plan :  (Medical Decision Making)     Hospital Problems  Date Reviewed: 9/11/2019          Codes Class Noted POA    CAD (coronary artery disease) ICD-10-CM: I25.10  ICD-9-CM: 414.00  9/16/2019 Yes        Encounter for weaning from ventilator [facility]) ICD-10-CM: Z99.11  ICD-9-CM: V46.13  9/23/2019 Unknown        Atelectasis ICD-10-CM: J98.11  ICD-9-CM: 518.0  9/23/2019 Unknown        Hypoxia ICD-10-CM: R09.02  ICD-9-CM: 799.02  9/23/2019 Unknown        DM2 (diabetes mellitus, type 2) ([address] ICD-10-CM: E11.9  ICD-9-CM: 250.00  6/21/2012 Yes    Overview Signed 9/20/2016  9:58 AM by Noris Aponte     followed by PCP               HLD (hyperlipidemia) ICD-10-CM: E78.5  ICD-9-CM: 272.4  6/21/2012 Yes    Overview Signed 9/20/2016  9:59 AM by Noris Aponte     followed by PCP               HTN (hypertension) ICD-10-CM: I10  ICD-9-CM: 401.9  6/21/2012 Yes    Overview Signed 9/20/2016  9:58 AM by Noris Aponte     readings in target range                   Plan:   --Wean mechanical ventilation per protocol.   --Incentive spirometry every hour post extubation. --Review CXR  --Monitor for wheezing given chart documentation COPD (PFTs preop were normal). More than 50% of the time documented was spent in face-to-face contact with the patient and in the care of the patient on the floor/unit where the patient is located. Thank you for this referral.  We appreciate the opportunity to participate in this patient's care. Will follow along with you.     Bg Thakur MD

## 2019-09-23 NOTE — PROGRESS NOTES
Progress Note    2019  Admit Date: 2019  7:09 AM   NAME: Gi Cota   :  1945   MRN:  [id number]   Attending: Marisela Bales MD  PCP:  Ida Chaney MD  Treatment Team: Attending Provider: Marisela Bales MD; Consulting Provider: Natalie Roland MD; Care Manager: Ja Hankins; Utilization Review: Verena Severs, RN; Consulting Provider: Pancho Whitten MD; Consulting Provider: Wyatt Grimes MD; Staff Nurse: Joy Jewell RN    Full Code   SUBJECTIVE:   Ms. Blake Santana is a 74 yo female with PMH of CAD, COPD, DM II, HTN, hypothyroidism, hyperlipidemia who underwent CABG today. Hospitalist consulted for diabetes management. A1C 11.9. Diabetes educator following. BGL yesterday better. Received 1/2 dose lantus last night in preparation for surgery today. Pt on vent. Will continue to follow glucose control.       Past Medical History:   Diagnosis Date    Anxiety and depression     Arthritis     CAD (coronary artery disease) 2019    Chest discomfort     2014 - Lexiscan MPI - normal EKG, perfusion images and LVEF    Chronic back pain     [facility] obstructive [facility]) [facility])     DM2 (diabetes mellitus, type 2) ([facility] [address] 2012    Frequent UTI     GERD (gastroesophageal reflux disease)     H/O seasonal allergies     Heart murmur     Hiatal hernia     History of kidney stones     HLD (hyperlipidemia) 2012    HTN (hypertension) 2012    Hypothyroidism     Psychiatric disorder     Varicose veins 2012     Recent Results (from the past 24 hour(s))   GLUCOSE, POC    Collection Time: 19  4:45 PM   Result Value Ref Range    Glucose (POC) 119 (H) 65 - 100 mg/dL   GLUCOSE, POC    Collection Time: 19  9:06 PM   Result Value Ref Range    Glucose (POC) 194 (H) 65 - 100 mg/dL   GLUCOSE, POC    Collection Time: 19  4:43 AM   Result Value Ref Range    Glucose (POC) 194 (H) 65 - 100 mg/dL   POC CG8I    Collection Time: 09/23/19  8:00 AM   Result Value Ref Range    pH (POC) 7.484 (H) 7.35 - 7.45      pCO2 (POC) 38.4 35 - 45 MMHG    pO2 (POC) 257 (H) 75 - 100 MMHG    HCO3 (POC) 28.8 (H) 22 - 26 MMOL/L    sO2 (POC) 100 (H) 95 - 98 %    Base excess (POC) 5 mmol/L    Sodium,  136 - 145 MMOL/L    Potassium, POC 3.7 3.5 - 5.1 MMOL/L    Glucose,  (H) 65 - 100 MG/DL    Calcium, ionized (POC) 1.23 1.12 - 1.32 mmol/L   POC CG8I    Collection Time: 09/23/19  9:34 AM   Result Value Ref Range    pH (POC) 7.428 7.35 - 7.45      pCO2 (POC) 40.5 35 - 45 MMHG    pO2 (POC) 190 (H) 75 - 100 MMHG    HCO3 (POC) 26.8 (H) 22 - 26 MMOL/L    sO2 (POC) 100 (H) 95 - 98 %    Base excess (POC) 2 mmol/L    Sodium,  136 - 145 MMOL/L    Potassium, POC 3.6 3.5 - 5.1 MMOL/L    Glucose,  (H) 65 - 100 MG/DL    Calcium, ionized (POC) 1.23 1.12 - 1.32 mmol/L   POC CG8I    Collection Time: 09/23/19 10:09 AM   Result Value Ref Range    pH (POC) 7.392 7.35 - 7.45      pCO2 (POC) 40.2 35 - 45 MMHG    pO2 (POC) 50 (L) 75 - 100 MMHG    HCO3 (POC) 24.5 22 - 26 MMOL/L    sO2 (POC) 85 (L) 95 - 98 %    Base excess (POC) 0 mmol/L    Sodium,  136 - 145 MMOL/L    Potassium, POC 4.4 3.5 - 5.1 MMOL/L    Glucose,  (H) 65 - 100 MG/DL    Calcium, ionized (POC) 1.15 1.12 - 1.32 mmol/L   POC CG8I    Collection Time: 09/23/19 10:35 AM   Result Value Ref Range    pH (POC) 7.417 7.35 - 7.45      pCO2 (POC) 38.8 35 - 45 MMHG    pO2 (POC) 224 (H) 75 - 100 MMHG    HCO3 (POC) 25.0 22 - 26 MMOL/L    sO2 (POC) 100 (H) 95 - 98 %    Base excess (POC) 0 mmol/L    Sodium,  136 - 145 MMOL/L    Potassium, POC 4.0 3.5 - 5.1 MMOL/L    Glucose,  (H) 65 - 100 MG/DL    Calcium, ionized (POC) 1.16 1.12 - 1.32 mmol/L   POC CG8I    Collection Time: 09/23/19 11:13 AM   Result Value Ref Range    pH (POC) 7.358 7.35 - 7.45      pCO2 (POC) 46.3 (H) 35 - 45 MMHG    pO2 (POC) 423 (H) 75 - 100 MMHG    HCO3 (POC) 26.1 (H) 22 - 26 MMOL/L    sO2 (POC) 100 (H) 95 - 98 %    Base excess (POC) 0 mmol/L    Sodium,  136 - 145 MMOL/L    Potassium, POC 3.7 3.5 - 5.1 MMOL/L    Glucose,  (H) 65 - 100 MG/DL    Calcium, ionized (POC) 1.16 1.12 - 1.32 mmol/L   GLUCOSE, POC    Collection Time: 09/23/19 12:08 PM   Result Value Ref Range    Glucose (POC) 137 (H) 65 - 100 mg/dL   EKG, 12 LEAD, INITIAL    Collection Time: 09/23/19 12:09 PM   Result Value Ref Range    Ventricular Rate 82 BPM    Atrial Rate 82 BPM    P-R Interval 156 ms    QRS Duration 84 ms    Q-T Interval 450 ms    QTC Calculation (Bezet) 525 ms    Calculated P Axis 68 degrees    Calculated R Axis 51 degrees    Calculated T Axis 15 degrees    Diagnosis       Normal sinus rhythm  Nonspecific ST and T wave abnormality  Prolonged QT  Abnormal ECG  When compared with ECG of 16-SEP-2019 08:28,  ST now depressed in Lateral leads  Nonspecific T wave abnormality no longer evident in Lateral leads  QT has lengthened  Confirmed by [facility]  MD (), MAX ELY ([id number]) on 9/23/2019 1:02:27 PM     POC CG8I    Collection Time: 09/23/19 12:11 PM   Result Value Ref Range    Device: VENT      FIO2 (POC) 50 %    pH (POC) 7.425 7.35 - 7.45      pCO2 (POC) 33.8 (L) 35 - 45 MMHG    pO2 (POC) 110 (H) 75 - 100 MMHG    HCO3 (POC) 22.2 22 - 26 MMOL/L    sO2 (POC) 98 95 - 98 %    Base deficit (POC) 2 mmol/L    Mode Pressure regulated volume control      Tidal volume 500 ml    Set Rate 16 bpm    PEEP/CPAP (POC) 8 cmH2O    Allens test (POC) NOT APPLICABLE      Site DRAWN FROM ARTERIAL LINE      Patient temp.  98.6      Specimen type (POC) ARTERIAL      Sodium,  136 - 145 MMOL/L    Potassium, POC 3.3 (L) 3.5 - 5.1 MMOL/L    Glucose,  (H) 65 - 100 MG/DL    Calcium, ionized (POC) 1.26 1.12 - 1.32 mmol/L    Performed by [employer]in     Exhaled minute volume 8.00 L/min    COLLECT TIME 2,515     METABOLIC PANEL, BASIC    Collection Time: 09/23/19 12:22 PM   Result Value Ref Range    Sodium 145 136 - 145 mmol/L    Potassium 3.5 3.5 - 5.1 mmol/L    Chloride 112 (H) 98 - 107 mmol/L    CO2 25 21 - 32 mmol/L    Anion gap 8 7 - 16 mmol/L    Glucose 133 (H) 65 - 100 mg/dL    BUN 15 8 - 23 MG/DL    Creatinine 0.73 0.6 - 1.0 MG/DL    GFR est AA >60 >60 ml/min/1.73m2    GFR est non-AA >60 >60 ml/min/1.73m2    Calcium 8.3 8.3 - 10.4 MG/DL   MAGNESIUM    Collection Time: 09/23/19 12:22 PM   Result Value Ref Range    Magnesium 3.1 (H) 1.8 - 2.4 mg/dL   CBC W/O DIFF    Collection Time: 09/23/19 12:22 PM   Result Value Ref Range    WBC 13.0 (H) 4.3 - 11.1 K/uL    RBC 3.31 (L) 4.05 - 5.2 M/uL    HGB 11.1 (L) 11.7 - 15.4 g/dL    HCT 34.1 (L) 35.8 - 46.3 %    .0 (H) 79.6 - 97.8 FL    MCH 33.5 (H) 26.1 - 32.9 PG    MCHC 32.6 31.4 - 35.0 g/dL    RDW 12.8 11.9 - 14.6 %    PLATELET 746 683 - 774 K/uL    MPV 10.9 9.4 - 12.3 FL    ABSOLUTE NRBC 0.00 0.0 - 0.2 K/uL   PTT    Collection Time: 09/23/19 12:22 PM   Result Value Ref Range    aPTT 28.0 24.7 - 39.8 SEC   PROTHROMBIN TIME + INR    Collection Time: 09/23/19 12:22 PM   Result Value Ref Range    Prothrombin time 14.1 11.7 - 14.5 sec    INR 1.1     FIBRINOGEN    Collection Time: 09/23/19 12:22 PM   Result Value Ref Range    Fibrinogen 373 190 - 501 mg/dL     Allergies   Allergen Reactions    Aspirin Itching    Codeine Itching and Swelling    Keflex [Cephalexin] Rash    Pcn [Penicillins] Rash    Sulfa Dyne Itching     Current Facility-Administered Medications   Medication Dose Route Frequency Provider Last Rate Last Dose    0.9% sodium chloride infusion  25 mL/hr IntraVENous CONTINUOUS Christie Ochoa MD        dextrose 5% - 0.45% NaCl with KCl 20 mEq/L infusion  25 mL/hr IntraVENous CONTINUOUS Christie Ochoa MD        sodium chloride (NS) flush 5-40 mL  5-40 mL IntraVENous Q8H Christie Ochoa MD        sodium chloride (NS) flush 5-40 mL  5-40 mL IntraVENous PRN Christie Ochoa MD        oxyCODONE-acetaminophen (PERCOCET) 5-325 mg per tablet 1 Tab  1 Tab Oral Q4H PRN Andreea Ramos MD        morphine 10 mg/ml injection 3-5 mg  3-5 mg IntraVENous Q1H PRN Andreea Ramos MD        naloxone [facility]) injection 0.4 mg  0.4 mg IntraVENous PRN Andreea Ramos MD        mupirocin (BACTROBAN) 2 % ointment   Both Nostrils BID Andreea Ramos MD        ceFAZolin (ANCEF) 2 g/20 mL in sterile water IV syringe  2 g IntraVENous Q8H Andreea Ramos MD        sodium bicarbonate (8.4%) injection 50 mEq  50 mEq IntraVENous PRN Andreea Ramos MD        EPINEPHrine (ADRENALIN) 4 mg in 0.9% sodium chloride 250 mL infusion  0.05-0.1 mcg/kg/min IntraVENous TITRATE Andreea Ramos MD        nitroglycerin (Tridil) 200 mcg/ml infusion   mcg/min IntraVENous TITRATE Andreea Ramos MD        lidocaine (PF) (XYLOCAINE) 100 mg/5 mL (2 %) injection syringe  mg   mg IntraVENous ONCE PRN Andreea Ramos MD        amiodarone (CORDARONE) tablet 200 mg  200 mg Oral Q12H Andreea Ramos MD        [START ON 9/24/2019] metoprolol tartrate (LOPRESSOR) tablet 25 mg  25 mg Oral Q12H Andreea Ramos MD        atorvastatin (LIPITOR) tablet 80 mg  80 mg Oral QHS Andreea Ramos MD        insulin regular (NOVOLIN R, HUMULIN R) 100 Units in 0.9% sodium chloride 100 mL infusion  1 Units/hr IntraVENous TITRATE Andreea Ramos MD        dextrose (D50W) injection syrg 12.5 g  25 mL IntraVENous PRN Andreea Ramos MD        [START ON 9/24/2019] aspirin chewable tablet 81 mg  81 mg Oral DAILY Andreea Ramos MD        magnesium sulfate 1 g/100 ml IVPB (premix or compounded)  1 g IntraVENous PRN Andreea Ramos MD        potassium chloride 10 mEq in 50 ml IVPB  10 mEq IntraVENous PRN Andreea Ramos MD        midazolam (VERSED) injection 1 mg  1 mg IntraVENous Q1H PRN Andreea Ramos MD        propofol (DIPRIVAN) infusion  0-50 mcg/kg/min IntraVENous TITRATE Andreea Ramos MD        meperidine (DEMEROL) injection 25 mg  25 mg IntraVENous Q1H PRN Anastasiia Patel MD        chlorhexidine (PERIDEX) 0.12 % mouthwash 10 mL  10 mL Oral BID Anastasiia Patel MD        tuberculin injection 5 Units  5 Units IntraDERMal ONCE Anastasiia Patel MD        PHENYLephrine (PF)(JOHNATHAN-SYNEPHRINE) 30 mg in 0.9% sodium chloride 250 mL infusion   mcg/min IntraVENous TITRATE Anastasiia Patel MD        alum-mag hydroxide-[facility]) oral suspension 30 mL  30 mL Oral Q4H PRN Lucita Akers MD   30 mL at 09/22/19 1615    heparin (porcine) 25,000 Units in 0.9% sodium chloride 500 mL infusion  12-25 Units/kg/hr IntraVENous CONTINUOUS Lucita Akers MD   Stopped at 09/23/19 0400    [Held by provider] insulin glargine (LANTUS) injection 50 Units  50 Units SubCUTAneous QHS Remus Debra, DO   50 Units at 09/22/19 2130    [Held by provider] insulin lispro (HUMALOG) injection 15 Units  15 Units SubCUTAneous TIDAC Remus Debra, DO   15 Units at 09/22/19 1722    magnesium oxide (MAG-OX) tablet 400 mg  400 mg Oral DAILY Remus Debra, DO   400 mg at 09/22/19 7950    diphenhydrAMINE (BENADRYL) capsule 25 mg  25 mg Oral Q6H PRN Lucita Akers MD   25 mg at 09/21/19 2158    acetaminophen (TYLENOL) tablet 650 mg  650 mg Oral Q4H PRN Lucita Akers MD   650 mg at 09/20/19 0051    amLODIPine (NORVASC) tablet 2.5 mg  2.5 mg Oral DAILY Lucita Akers MD   2.5 mg at 09/22/19 0831    cyclobenzaprine (FLEXERIL) tablet 10 mg  10 mg Oral TID PRN Lucita Akers MD        montelukast (SINGULAIR) tablet 10 mg  10 mg Oral DAILY Lucita Akers MD   10 mg at 09/22/19 0831    pantoprazole (PROTONIX) tablet 40 mg  40 mg Oral ACB Lucita Akers MD   40 mg at 09/23/19 0434    pioglitazone (ACTOS) tablet 30 mg  30 mg Oral DAILY Lucita Distad, MD   30 mg at 09/22/19 0831    influenza vaccine 2019-20 (6 mos+)(PF) (FLUARIX/FLULAVAL/FLUZONE QUAD) injection 0.5 mL  0.5 mL IntraMUSCular PRIOR TO DISCHARGE Hazle Litten, MD        [Held by provider] insulin lispro (HUMALOG) injection   SubCUTAneous AC&HS Alexander Nunes MD   2 Units at 190       Review of Systems unable to obtain  PHYSICAL EXAM     Visit Vitals  /47 (BP 1 Location: Left arm, BP Patient Position: At rest) Comment: cvp 8, pa 24/12, ci 2.5   Pulse 81   Temp 97.8 °F (36.6 °C)   Resp 18   Ht 5' 2\" (1.575 m)   Wt 69.5 kg (153 lb 2.5 oz)   SpO2 99%   Breastfeeding? No   BMI 28.01 kg/m²      Temp (24hrs), Av.1 °F (36.7 °C), Min:97.5 °F (36.4 °C), Max:98.5 °F (36.9 °C)    Oxygen Therapy  O2 Sat (%): 99 % (19 1230)  Pulse via Oximetry: 82 beats per minute (19 1230)  O2 Device: Nasal cannula (19 0540)  O2 Flow Rate (L/min): 3 l/min (19 0540)  FIO2 (%): 60 % (19 1153)    Intake/Output Summary (Last 24 hours) at 2019 1313  Last data filed at 2019 1127  Gross per 24 hour   Intake 2058. 33 ml   Output 1000 ml   Net 1058.33 ml      General: Sedated, orally intubated, on vent   Lungs: CTA bilaterally. On vent  Heart:  S1S2 present without murmurs rubs gallops. RRR. No LE edema  Abdomen: Soft, non tender, non distended. BS present  Extremities: Sedated, no movement.  Left leg harvest site with ace wrap  Neurologic:  Sedated     Results summary of Diagnostic Studies/Procedures copied from within [facility] EMR:        [address] Problems    Diagnosis Date Noted    CAD (coronary artery disease) 2019     Priority: 1 - One    Encounter for weaning from ventilator ([facility] [address] 2019    Atelectasis 2019    Hypoxia 2019    DM2 (diabetes mellitus, type 2) ([facility] [address] 2012     followed by PCP        HLD (hyperlipidemia) 2012     followed by PCP        HTN (hypertension) 2012     readings in target range       Plan:    CAD  S/p CABG today with CT Surgery  On Vent, Pulmonary following    DM II  A1C 11.9  Diabetes management following  Holding insulin until taking PO, received 1/2 dose lantus last night    HTN  Per primary      Notes, labs, VS, diagnostic testing reviewed  Time spent with pt 15 min       Plan of Care Discussed with: Supervising MD  Dr. Ximena Smith, care team      Sathish Santana NP

## 2019-09-23 NOTE — ANESTHESIA POSTPROCEDURE EVALUATION
Procedure(s):  CORONARY ARTERY BYPASS GRAFT (CABG X 3)/ LIMA  VEIN HARVEST/ GREATER SAPHENOUS  ESOPHAGEAL TRANS ECHOCARDIOGRAM.    general    Anesthesia Post Evaluation      Multimodal analgesia: multimodal analgesia not used between 6 hours prior to anesthesia start to PACU discharge  Patient location during evaluation: ICU  Patient participation: complete - patient cannot participate  Level of consciousness: obtunded/minimal responses  Pain management: adequate  Airway patency: patent  Anesthetic complications: no  Cardiovascular status: acceptable and hemodynamically stable  Respiratory status: intubated, ETT and ventilator  Hydration status: acceptable  Post anesthesia nausea and vomiting:  controlled      Vitals Value Taken Time   /47 9/23/2019 11:59 AM   Temp 36.4 °C (97.5 °F) 9/23/2019 11:59 AM   Pulse 81 9/23/2019 12:07 PM   Resp 0 9/23/2019 12:06 PM   SpO2 100 % 9/23/2019 12:07 PM   Vitals shown include unvalidated device data.

## 2019-09-23 NOTE — ANESTHESIA PROCEDURE NOTES
Arterial Line Placement    Start time: 9/23/2019 7:14 AM  End time: 9/23/2019 7:18 AM  Performed by: Neftaly Andino CRNA  Authorized by: Seven Urbano MD     Pre-Procedure  Indications:  Arterial pressure monitoring and blood sampling  Preanesthetic Checklist: patient identified, risks and benefits discussed, anesthesia consent, site marked, patient being monitored, timeout performed and patient being monitored    Timeout Time: 07:14        Procedure:   Prep:  Chlorhexidine  Seldinger Technique?: Yes    Orientation:  Left  Location:  Radial artery  Catheter size:  20 G  Number of attempts:  1  Cont Cardiac Output Sensor: No      Assessment:   Post-procedure:  Line secured and sterile dressing applied  Patient Tolerance:  Patient tolerated the procedure well with no immediate complications

## 2019-09-23 NOTE — PROGRESS NOTES
TRANSFER - IN REPORT:    Verbal report received from Jennifer GLASS(name) on Adrianna Cox  being received from OR(unit) for routine post - op      Report consisted of patients Situation, Background, Assessment and   Recommendations(SBAR). Information from the following report(s) SBAR, OR Summary, Intake/Output, MAR, Recent Results and Cardiac Rhythm SR was reviewed with the receiving nurse. Per anesthesia on admission patient intubation Normal    Collaborative team agrees of surgeon, anesthesia, RT, and RN agrees to proceed with CV weaning protocol        Opportunity for questions and clarification was provided. Assessment completed upon patients arrival to unit and care assumed.   Received to john AHN

## 2019-09-24 ENCOUNTER — APPOINTMENT (OUTPATIENT)
Dept: GENERAL RADIOLOGY | Age: 74
DRG: 234 | End: 2019-09-24
Attending: THORACIC SURGERY (CARDIOTHORACIC VASCULAR SURGERY)
Payer: MEDICARE

## 2019-09-24 LAB
ANION GAP SERPL CALC-SCNC: 6 MMOL/L (ref 7–16)
ATRIAL RATE: 76 BPM
BUN SERPL-MCNC: 11 MG/DL (ref 8–23)
CALCIUM SERPL-MCNC: 8.1 MG/DL (ref 8.3–10.4)
CALCULATED P AXIS, ECG09: 53 DEGREES
CALCULATED R AXIS, ECG10: 33 DEGREES
CALCULATED T AXIS, ECG11: 89 DEGREES
CHLORIDE SERPL-SCNC: 110 MMOL/L (ref 98–107)
CO2 SERPL-SCNC: 25 MMOL/L (ref 21–32)
CREAT SERPL-MCNC: 0.43 MG/DL (ref 0.6–1)
DIAGNOSIS, 93000: NORMAL
ERYTHROCYTE [DISTWIDTH] IN BLOOD BY AUTOMATED COUNT: 13.3 % (ref 11.9–14.6)
GLUCOSE BLD STRIP.AUTO-MCNC: 111 MG/DL (ref 65–100)
GLUCOSE BLD STRIP.AUTO-MCNC: 113 MG/DL (ref 65–100)
GLUCOSE BLD STRIP.AUTO-MCNC: 115 MG/DL (ref 65–100)
GLUCOSE BLD STRIP.AUTO-MCNC: 117 MG/DL (ref 65–100)
GLUCOSE BLD STRIP.AUTO-MCNC: 183 MG/DL (ref 65–100)
GLUCOSE BLD STRIP.AUTO-MCNC: 210 MG/DL (ref 65–100)
GLUCOSE BLD STRIP.AUTO-MCNC: 236 MG/DL (ref 65–100)
GLUCOSE BLD STRIP.AUTO-MCNC: 238 MG/DL (ref 65–100)
GLUCOSE BLD STRIP.AUTO-MCNC: 255 MG/DL (ref 65–100)
GLUCOSE SERPL-MCNC: 100 MG/DL (ref 65–100)
HCT VFR BLD AUTO: 31.9 % (ref 35.8–46.3)
HGB BLD-MCNC: 10.3 G/DL (ref 11.7–15.4)
MAGNESIUM SERPL-MCNC: 2.1 MG/DL (ref 1.8–2.4)
MCH RBC QN AUTO: 33.8 PG (ref 26.1–32.9)
MCHC RBC AUTO-ENTMCNC: 32.3 G/DL (ref 31.4–35)
MCV RBC AUTO: 104.6 FL (ref 79.6–97.8)
MM INDURATION POC: 0 MM (ref 0–5)
NRBC # BLD: 0 K/UL (ref 0–0.2)
P-R INTERVAL, ECG05: 150 MS
PLATELET # BLD AUTO: 162 K/UL (ref 150–450)
PMV BLD AUTO: 11 FL (ref 9.4–12.3)
POTASSIUM SERPL-SCNC: 4.2 MMOL/L (ref 3.5–5.1)
PPD POC: NEGATIVE NEGATIVE
Q-T INTERVAL, ECG07: 418 MS
QRS DURATION, ECG06: 70 MS
QTC CALCULATION (BEZET), ECG08: 470 MS
RBC # BLD AUTO: 3.05 M/UL (ref 4.05–5.2)
SODIUM SERPL-SCNC: 141 MMOL/L (ref 136–145)
VENTRICULAR RATE, ECG03: 76 BPM
WBC # BLD AUTO: 10 K/UL (ref 4.3–11.1)

## 2019-09-24 PROCEDURE — 74011636637 HC RX REV CODE- 636/637: Performed by: FAMILY MEDICINE

## 2019-09-24 PROCEDURE — 65660000004 HC RM CVT STEPDOWN

## 2019-09-24 PROCEDURE — 71045 X-RAY EXAM CHEST 1 VIEW: CPT

## 2019-09-24 PROCEDURE — 74011250636 HC RX REV CODE- 250/636: Performed by: THORACIC SURGERY (CARDIOTHORACIC VASCULAR SURGERY)

## 2019-09-24 PROCEDURE — 83735 ASSAY OF MAGNESIUM: CPT

## 2019-09-24 PROCEDURE — 99232 SBSQ HOSP IP/OBS MODERATE 35: CPT | Performed by: INTERNAL MEDICINE

## 2019-09-24 PROCEDURE — 93005 ELECTROCARDIOGRAM TRACING: CPT | Performed by: THORACIC SURGERY (CARDIOTHORACIC VASCULAR SURGERY)

## 2019-09-24 PROCEDURE — 97161 PT EVAL LOW COMPLEX 20 MIN: CPT

## 2019-09-24 PROCEDURE — 80048 BASIC METABOLIC PNL TOTAL CA: CPT

## 2019-09-24 PROCEDURE — 94760 N-INVAS EAR/PLS OXIMETRY 1: CPT

## 2019-09-24 PROCEDURE — 74011250637 HC RX REV CODE- 250/637: Performed by: THORACIC SURGERY (CARDIOTHORACIC VASCULAR SURGERY)

## 2019-09-24 PROCEDURE — 74011250637 HC RX REV CODE- 250/637: Performed by: INTERNAL MEDICINE

## 2019-09-24 PROCEDURE — 85027 COMPLETE CBC AUTOMATED: CPT

## 2019-09-24 PROCEDURE — 97530 THERAPEUTIC ACTIVITIES: CPT

## 2019-09-24 PROCEDURE — 77010033678 HC OXYGEN DAILY

## 2019-09-24 PROCEDURE — 74011636637 HC RX REV CODE- 636/637: Performed by: THORACIC SURGERY (CARDIOTHORACIC VASCULAR SURGERY)

## 2019-09-24 PROCEDURE — 36600 WITHDRAWAL OF ARTERIAL BLOOD: CPT

## 2019-09-24 PROCEDURE — 82962 GLUCOSE BLOOD TEST: CPT

## 2019-09-24 RX ORDER — POTASSIUM CHLORIDE 20 MEQ/1
40 TABLET, EXTENDED RELEASE ORAL
Status: DISCONTINUED | OUTPATIENT
Start: 2019-09-24 | End: 2019-09-27 | Stop reason: HOSPADM

## 2019-09-24 RX ORDER — METFORMIN HYDROCHLORIDE 500 MG/1
1000 TABLET ORAL 2 TIMES DAILY WITH MEALS
Status: DISCONTINUED | OUTPATIENT
Start: 2019-09-24 | End: 2019-09-27 | Stop reason: HOSPADM

## 2019-09-24 RX ORDER — LANOLIN ALCOHOL/MO/W.PET/CERES
325 CREAM (GRAM) TOPICAL
Status: DISCONTINUED | OUTPATIENT
Start: 2019-09-25 | End: 2019-09-27 | Stop reason: HOSPADM

## 2019-09-24 RX ORDER — AMIODARONE HYDROCHLORIDE 200 MG/1
200 TABLET ORAL EVERY 12 HOURS
Status: DISCONTINUED | OUTPATIENT
Start: 2019-09-24 | End: 2019-09-26

## 2019-09-24 RX ORDER — SODIUM CHLORIDE 0.9 % (FLUSH) 0.9 %
5-40 SYRINGE (ML) INJECTION AS NEEDED
Status: DISCONTINUED | OUTPATIENT
Start: 2019-09-24 | End: 2019-09-27 | Stop reason: HOSPADM

## 2019-09-24 RX ORDER — SODIUM CHLORIDE 0.9 % (FLUSH) 0.9 %
5-40 SYRINGE (ML) INJECTION EVERY 8 HOURS
Status: DISCONTINUED | OUTPATIENT
Start: 2019-09-24 | End: 2019-09-27 | Stop reason: HOSPADM

## 2019-09-24 RX ORDER — DOCUSATE SODIUM 100 MG/1
100 CAPSULE, LIQUID FILLED ORAL DAILY
Status: DISCONTINUED | OUTPATIENT
Start: 2019-09-25 | End: 2019-09-24

## 2019-09-24 RX ORDER — ACETAMINOPHEN 325 MG/1
650 TABLET ORAL
Status: DISCONTINUED | OUTPATIENT
Start: 2019-09-24 | End: 2019-09-27 | Stop reason: HOSPADM

## 2019-09-24 RX ORDER — ATORVASTATIN CALCIUM 40 MG/1
80 TABLET, FILM COATED ORAL
Status: DISCONTINUED | OUTPATIENT
Start: 2019-09-24 | End: 2019-09-27 | Stop reason: HOSPADM

## 2019-09-24 RX ORDER — FUROSEMIDE 10 MG/ML
20 INJECTION INTRAMUSCULAR; INTRAVENOUS ONCE
Status: COMPLETED | OUTPATIENT
Start: 2019-09-24 | End: 2019-09-24

## 2019-09-24 RX ORDER — POTASSIUM CHLORIDE 20 MEQ/1
20 TABLET, EXTENDED RELEASE ORAL
Status: DISCONTINUED | OUTPATIENT
Start: 2019-09-24 | End: 2019-09-27 | Stop reason: HOSPADM

## 2019-09-24 RX ORDER — TRAMADOL HYDROCHLORIDE 50 MG/1
50 TABLET ORAL
Status: DISCONTINUED | OUTPATIENT
Start: 2019-09-24 | End: 2019-09-27 | Stop reason: HOSPADM

## 2019-09-24 RX ORDER — LANOLIN ALCOHOL/MO/W.PET/CERES
400 CREAM (GRAM) TOPICAL
Status: DISCONTINUED | OUTPATIENT
Start: 2019-09-24 | End: 2019-09-27 | Stop reason: HOSPADM

## 2019-09-24 RX ORDER — NITROGLYCERIN 0.4 MG/1
0.4 TABLET SUBLINGUAL
Status: DISCONTINUED | OUTPATIENT
Start: 2019-09-24 | End: 2019-09-27 | Stop reason: HOSPADM

## 2019-09-24 RX ORDER — MAG HYDROX/ALUMINUM HYD/SIMETH 200-200-20
30 SUSPENSION, ORAL (FINAL DOSE FORM) ORAL
Status: DISCONTINUED | OUTPATIENT
Start: 2019-09-24 | End: 2019-09-27 | Stop reason: HOSPADM

## 2019-09-24 RX ORDER — INSULIN GLARGINE 100 [IU]/ML
26 INJECTION, SOLUTION SUBCUTANEOUS
Status: DISCONTINUED | OUTPATIENT
Start: 2019-09-24 | End: 2019-09-24

## 2019-09-24 RX ORDER — INSULIN GLARGINE 100 [IU]/ML
10 INJECTION, SOLUTION SUBCUTANEOUS
Status: DISCONTINUED | OUTPATIENT
Start: 2019-09-24 | End: 2019-09-25

## 2019-09-24 RX ORDER — ONDANSETRON 2 MG/ML
4 INJECTION INTRAMUSCULAR; INTRAVENOUS
Status: DISCONTINUED | OUTPATIENT
Start: 2019-09-24 | End: 2019-09-27 | Stop reason: HOSPADM

## 2019-09-24 RX ORDER — AMOXICILLIN 250 MG
2 CAPSULE ORAL 2 TIMES DAILY
Status: DISCONTINUED | OUTPATIENT
Start: 2019-09-24 | End: 2019-09-27 | Stop reason: HOSPADM

## 2019-09-24 RX ORDER — LISINOPRIL 5 MG/1
2.5 TABLET ORAL DAILY
Status: DISCONTINUED | OUTPATIENT
Start: 2019-09-25 | End: 2019-09-27 | Stop reason: HOSPADM

## 2019-09-24 RX ORDER — MUPIROCIN 20 MG/G
OINTMENT TOPICAL 2 TIMES DAILY
Status: COMPLETED | OUTPATIENT
Start: 2019-09-24 | End: 2019-09-27

## 2019-09-24 RX ORDER — FUROSEMIDE 40 MG/1
40 TABLET ORAL DAILY
Status: DISCONTINUED | OUTPATIENT
Start: 2019-09-25 | End: 2019-09-27 | Stop reason: HOSPADM

## 2019-09-24 RX ORDER — FAMOTIDINE 20 MG/1
20 TABLET, FILM COATED ORAL EVERY 12 HOURS
Status: DISCONTINUED | OUTPATIENT
Start: 2019-09-24 | End: 2019-09-27 | Stop reason: HOSPADM

## 2019-09-24 RX ORDER — POTASSIUM CHLORIDE 750 MG/1
10 TABLET, EXTENDED RELEASE ORAL DAILY
Status: DISCONTINUED | OUTPATIENT
Start: 2019-09-25 | End: 2019-09-27 | Stop reason: HOSPADM

## 2019-09-24 RX ORDER — ASPIRIN 81 MG/1
81 TABLET ORAL DAILY
Status: DISCONTINUED | OUTPATIENT
Start: 2019-09-25 | End: 2019-09-24

## 2019-09-24 RX ADMIN — TRAMADOL HYDROCHLORIDE 50 MG: 50 TABLET, FILM COATED ORAL at 17:17

## 2019-09-24 RX ADMIN — INSULIN HUMAN 10 UNITS: 100 INJECTION, SOLUTION PARENTERAL at 14:35

## 2019-09-24 RX ADMIN — Medication 10 ML: at 17:18

## 2019-09-24 RX ADMIN — OXYCODONE HYDROCHLORIDE AND ACETAMINOPHEN 1 TABLET: 5; 325 TABLET ORAL at 23:09

## 2019-09-24 RX ADMIN — AMIODARONE HYDROCHLORIDE 200 MG: 200 TABLET ORAL at 09:34

## 2019-09-24 RX ADMIN — PANTOPRAZOLE SODIUM 40 MG: 40 TABLET, DELAYED RELEASE ORAL at 06:16

## 2019-09-24 RX ADMIN — ACETAMINOPHEN 650 MG: 325 TABLET, FILM COATED ORAL at 21:18

## 2019-09-24 RX ADMIN — ACETAMINOPHEN 650 MG: 325 TABLET, FILM COATED ORAL at 14:39

## 2019-09-24 RX ADMIN — AMIODARONE HYDROCHLORIDE 200 MG: 200 TABLET ORAL at 20:27

## 2019-09-24 RX ADMIN — MUPIROCIN: 20 OINTMENT TOPICAL at 20:29

## 2019-09-24 RX ADMIN — SENNOSIDES, DOCUSATE SODIUM 2 TABLET: 50; 8.6 TABLET, FILM COATED ORAL at 20:27

## 2019-09-24 RX ADMIN — FUROSEMIDE 20 MG: 10 INJECTION, SOLUTION INTRAMUSCULAR; INTRAVENOUS at 09:35

## 2019-09-24 RX ADMIN — METFORMIN HYDROCHLORIDE 1000 MG: 500 TABLET ORAL at 17:18

## 2019-09-24 RX ADMIN — INSULIN HUMAN 5 UNITS: 100 INJECTION, SOLUTION PARENTERAL at 18:22

## 2019-09-24 RX ADMIN — INSULIN GLARGINE 10 UNITS: 100 INJECTION, SOLUTION SUBCUTANEOUS at 20:27

## 2019-09-24 RX ADMIN — ATORVASTATIN CALCIUM 80 MG: 40 TABLET, FILM COATED ORAL at 20:27

## 2019-09-24 RX ADMIN — MONTELUKAST SODIUM 10 MG: 10 TABLET, FILM COATED ORAL at 09:34

## 2019-09-24 RX ADMIN — MAGNESIUM GLUCONATE 500 MG ORAL TABLET 400 MG: 500 TABLET ORAL at 09:34

## 2019-09-24 RX ADMIN — OXYCODONE HYDROCHLORIDE AND ACETAMINOPHEN 1 TABLET: 5; 325 TABLET ORAL at 00:29

## 2019-09-24 RX ADMIN — PIOGLITAZONE 30 MG: 30 TABLET ORAL at 09:34

## 2019-09-24 RX ADMIN — MUPIROCIN: 20 OINTMENT TOPICAL at 09:35

## 2019-09-24 RX ADMIN — TRAMADOL HYDROCHLORIDE 50 MG: 50 TABLET, FILM COATED ORAL at 10:39

## 2019-09-24 RX ADMIN — Medication 10 ML: at 06:15

## 2019-09-24 RX ADMIN — Medication 10 ML: at 20:29

## 2019-09-24 RX ADMIN — INSULIN HUMAN 10 UNITS: 100 INJECTION, SOLUTION PARENTERAL at 20:27

## 2019-09-24 RX ADMIN — INSULIN LISPRO 4 UNITS: 100 INJECTION, SOLUTION INTRAVENOUS; SUBCUTANEOUS at 11:59

## 2019-09-24 RX ADMIN — FAMOTIDINE 20 MG: 20 TABLET ORAL at 20:27

## 2019-09-24 RX ADMIN — Medication 2 G: at 04:44

## 2019-09-24 RX ADMIN — OXYCODONE HYDROCHLORIDE AND ACETAMINOPHEN 1 TABLET: 5; 325 TABLET ORAL at 04:43

## 2019-09-24 RX ADMIN — METOPROLOL TARTRATE 25 MG: 25 TABLET ORAL at 09:35

## 2019-09-24 NOTE — PROGRESS NOTES
TRANSFER - OUT REPORT:    Verbal report given to Jennifer Walsh RN on Araseli Cornea  being transferred to [facility] for routine progression of care       Report consisted of patients Situation, Background, Assessment and   Recommendations(SBAR). Information from the following report(s) SBAR, Kardex, OR Summary, Intake/Output, MAR, Recent Results and Cardiac Rhythm NSR was reviewed with the receiving nurse. Lines:   Peripheral IV 09/16/19 Anterior;Distal;Left Forearm (Active)   Site Assessment Clean, dry, & intact 9/24/2019  7:58 AM   Phlebitis Assessment 0 9/24/2019  7:58 AM   Infiltration Assessment 0 9/24/2019  7:58 AM   Dressing Status Clean, dry, & intact 9/24/2019  7:58 AM   Dressing Type Transparent;Tape 9/24/2019  7:58 AM   Hub Color/Line Status Pink;Flushed 9/24/2019  7:58 AM   Alcohol Cap Used No 9/22/2019  3:47 PM        Opportunity for questions and clarification was provided.       Patient transported with:   Monitor, O2, RN

## 2019-09-24 NOTE — PROGRESS NOTES
Progress Note    Patient: Larissa Styles MRN: [id number]  SSN: [id number]    YOB: 1945  Age: 75 y.o. Sex: female      Admit Date: 9/16/2019    LOS: 8 days     Subjective:   F/U CABG, DM type II    76 yo PMH of CAD, COPD, DM II, HTN, hypothyroidism, hyperlipidemia S/p CABG on 9/23. Extubated on 9/23. We are consulted for DM management. Currently all insulin regimen has been held except for Actos. Now on clear liquid diet. Denies significant pain. No other concerns.    Current Facility-Administered Medications   Medication Dose Route Frequency    insulin glargine (LANTUS) injection 26 Units  26 Units SubCUTAneous QHS    tapentadol (NUCYNTA) tablet 100 mg  100 mg Oral Q4H PRN    traMADol (ULTRAM) tablet 50 mg  50 mg Oral Q6H PRN    0.9% sodium chloride infusion  25 mL/hr IntraVENous CONTINUOUS    dextrose 5% - 0.45% NaCl with KCl 20 mEq/L infusion  25 mL/hr IntraVENous CONTINUOUS    sodium chloride (NS) flush 5-40 mL  5-40 mL IntraVENous Q8H    sodium chloride (NS) flush 5-40 mL  5-40 mL IntraVENous PRN    oxyCODONE-acetaminophen (PERCOCET) 5-325 mg per tablet 1 Tab  1 Tab Oral Q4H PRN    naloxone (NARCAN) injection 0.4 mg  0.4 mg IntraVENous PRN    mupirocin (BACTROBAN) 2 % ointment   Both Nostrils BID    sodium bicarbonate (8.4%) injection 50 mEq  50 mEq IntraVENous PRN    EPINEPHrine (ADRENALIN) 4 mg in 0.9% sodium chloride 250 mL infusion  0.05-0.1 mcg/kg/min IntraVENous TITRATE    nitroglycerin (Tridil) 200 mcg/ml infusion   mcg/min IntraVENous TITRATE    lidocaine (PF) (XYLOCAINE) 100 mg/5 mL (2 %) injection syringe  mg   mg IntraVENous ONCE PRN    amiodarone (CORDARONE) tablet 200 mg  200 mg Oral Q12H    metoprolol tartrate (LOPRESSOR) tablet 25 mg  25 mg Oral Q12H    atorvastatin (LIPITOR) tablet 80 mg  80 mg Oral QHS    insulin regular (NOVOLIN R, HUMULIN R) 100 Units in 0.9% sodium chloride 100 mL infusion  1 Units/hr IntraVENous TITRATE    dextrose (D50W) injection syrg 12.5 g  25 mL IntraVENous PRN    magnesium sulfate 1 g/100 ml IVPB (premix or compounded)  1 g IntraVENous PRN    potassium chloride 10 mEq in 50 ml IVPB  10 mEq IntraVENous PRN    midazolam (VERSED) injection 1 mg  1 mg IntraVENous Q1H PRN    propofol (DIPRIVAN) infusion  0-50 mcg/kg/min IntraVENous TITRATE    meperidine (DEMEROL) injection 25 mg  25 mg IntraVENous Q1H PRN    chlorhexidine (PERIDEX) 0.12 % mouthwash 10 mL  10 mL Oral BID    tuberculin injection 5 Units  5 Units IntraDERMal ONCE    PHENYLephrine (PF)(JOHNATHAN-SYNEPHRINE) 30 mg in 0.9% sodium chloride 250 mL infusion   mcg/min IntraVENous TITRATE    alum-mag hydroxide-simeth (MYLANTA) oral suspension 30 mL  30 mL Oral Q4H PRN    heparin (porcine) 25,000 Units in 0.9% sodium chloride 500 mL infusion  12-25 Units/kg/hr IntraVENous CONTINUOUS    [Held by provider] insulin glargine (LANTUS) injection 50 Units  50 Units SubCUTAneous QHS    [Held by provider] insulin lispro (HUMALOG) injection 15 Units  15 Units SubCUTAneous TIDAC    magnesium oxide (MAG-OX) tablet 400 mg  400 mg Oral DAILY    diphenhydrAMINE (BENADRYL) capsule 25 mg  25 mg Oral Q6H PRN    acetaminophen (TYLENOL) tablet 650 mg  650 mg Oral Q4H PRN    amLODIPine (NORVASC) tablet 2.5 mg  2.5 mg Oral DAILY    cyclobenzaprine (FLEXERIL) tablet 10 mg  10 mg Oral TID PRN    montelukast (SINGULAIR) tablet 10 mg  10 mg Oral DAILY    pantoprazole (PROTONIX) tablet 40 mg  40 mg Oral ACB    pioglitazone (ACTOS) tablet 30 mg  30 mg Oral DAILY    influenza vaccine 2019-20 (6 mos+)(PF) (FLUARIX/FLULAVAL/FLUZONE QUAD) injection 0.5 mL  0.5 mL IntraMUSCular PRIOR TO DISCHARGE    [Held by provider] insulin lispro (HUMALOG) injection   SubCUTAneous AC&HS       Objective:     Vitals:    09/24/19 0359 09/24/19 0459 09/24/19 0559 09/24/19 0934   BP: 112/54 116/56 111/53 118/56   Pulse: 82 80 79 80   Resp: 23 22 19    Temp: 99.2 °F (37.3 °C)      SpO2: 94% 99% 100%    Weight:  77.4 kg (170 lb 10.2 oz)     Height:             Intake and Output:  Current Shift: No intake/output data recorded. Last three shifts: 09/22 1901 - 09/24 0700  In: 5754.3 [I.V.:5384.3]  Out: 8613 [Urine:3155]    Physical Exam:   General:  Alert, cooperative, no distress, appears stated age. Eyes:  Conjunctivae/corneas clear. Ears:  Normal TMs and external ear canals both ears. Nose: Nares normal. Septum midline. Mouth/Throat: Lips, mucosa, and tongue normal.    Neck:  no JVD. Back:   deferred. Lungs:   Clear to auscultation bilaterally. Heart:  Regular rate and rhythm, S1, S2 normal, no murmur, click, rub or gallop. Abdomen:   Soft, non-tender. Bowel sounds normal.    Extremities: Extremities normal, atraumatic, no cyanosis or edema. Pulses: 2+ and symmetric all extremities. Skin: Anterior chest wall with bandage. No obvious signs of bleeding   Lymph nodes: Cervical, supraclavicular, and axillary nodes normal.   Neurologic: CNII-XII intact.         Lab/Data Review:    Recent Results (from the past 24 hour(s))   GLUCOSE, POC    Collection Time: 09/23/19 12:08 PM   Result Value Ref Range    Glucose (POC) 137 (H) 65 - 100 mg/dL   EKG, 12 LEAD, INITIAL    Collection Time: 09/23/19 12:09 PM   Result Value Ref Range    Ventricular Rate 82 BPM    Atrial Rate 82 BPM    P-R Interval 156 ms    QRS Duration 84 ms    Q-T Interval 450 ms    QTC Calculation (Bezet) 525 ms    Calculated P Axis 68 degrees    Calculated R Axis 51 degrees    Calculated T Axis 15 degrees    Diagnosis       Normal sinus rhythm  Nonspecific ST and T wave abnormality  Prolonged QT  Abnormal ECG  When compared with ECG of 16-SEP-2019 08:28,  ST now depressed in Lateral leads  Nonspecific T wave abnormality no longer evident in Lateral leads  QT has lengthened  Confirmed by Tonia Montalvo MD (), MAX ELY ([id number]) on 9/23/2019 1:02:27 PM     POC CG8I    Collection Time: 09/23/19 12:11 PM   Result Value Ref Range Device: VENT      FIO2 (POC) 50 %    pH (POC) 7.425 7.35 - 7.45      pCO2 (POC) 33.8 (L) 35 - 45 MMHG    pO2 (POC) 110 (H) 75 - 100 MMHG    HCO3 (POC) 22.2 22 - 26 MMOL/L    sO2 (POC) 98 95 - 98 %    Base deficit (POC) 2 mmol/L    Mode Pressure regulated volume control      Tidal volume 500 ml    Set Rate 16 bpm    PEEP/CPAP (POC) 8 cmH2O    Allens test (POC) NOT APPLICABLE      Site DRAWN FROM ARTERIAL LINE      Patient temp.  98.6      Specimen type (POC) ARTERIAL      Sodium,  136 - 145 MMOL/L    Potassium, POC 3.3 (L) 3.5 - 5.1 MMOL/L    Glucose,  (H) 65 - 100 MG/DL    Calcium, ionized (POC) 1.26 1.12 - 1.32 mmol/L    Performed by [employer]     Exhaled minute volume 8.00 L/min    COLLECT TIME 0,382     METABOLIC PANEL, BASIC    Collection Time: 09/23/19 12:22 PM   Result Value Ref Range    Sodium 145 136 - 145 mmol/L    Potassium 3.5 3.5 - 5.1 mmol/L    Chloride 112 (H) 98 - 107 mmol/L    CO2 25 21 - 32 mmol/L    Anion gap 8 7 - 16 mmol/L    Glucose 133 (H) 65 - 100 mg/dL    BUN 15 8 - 23 MG/DL    Creatinine 0.73 0.6 - 1.0 MG/DL    GFR est AA >60 >60 ml/min/1.73m2    GFR est non-AA >60 >60 ml/min/1.73m2    Calcium 8.3 8.3 - 10.4 MG/DL   MAGNESIUM    Collection Time: 09/23/19 12:22 PM   Result Value Ref Range    Magnesium 3.1 (H) 1.8 - 2.4 mg/dL   CBC W/O DIFF    Collection Time: 09/23/19 12:22 PM   Result Value Ref Range    WBC 13.0 (H) 4.3 - 11.1 K/uL    RBC 3.31 (L) 4.05 - 5.2 M/uL    HGB 11.1 (L) 11.7 - 15.4 g/dL    HCT 34.1 (L) 35.8 - 46.3 %    .0 (H) 79.6 - 97.8 FL    MCH 33.5 (H) 26.1 - 32.9 PG    MCHC 32.6 31.4 - 35.0 g/dL    RDW 12.8 11.9 - 14.6 %    PLATELET 697 281 - 717 K/uL    MPV 10.9 9.4 - 12.3 FL    ABSOLUTE NRBC 0.00 0.0 - 0.2 K/uL   PTT    Collection Time: 09/23/19 12:22 PM   Result Value Ref Range    aPTT 28.0 24.7 - 39.8 SEC   PROTHROMBIN TIME + INR    Collection Time: 09/23/19 12:22 PM   Result Value Ref Range    Prothrombin time 14.1 11.7 - 14.5 sec    INR 1.1 FIBRINOGEN    Collection Time: 09/23/19 12:22 PM   Result Value Ref Range    Fibrinogen 373 190 - 501 mg/dL   GLUCOSE, POC    Collection Time: 09/23/19  2:07 PM   Result Value Ref Range    Glucose (POC) 87 65 - 100 mg/dL   GLUCOSE, POC    Collection Time: 09/23/19  3:09 PM   Result Value Ref Range    Glucose (POC) 86 65 - 100 mg/dL   GLUCOSE, POC    Collection Time: 09/23/19  4:22 PM   Result Value Ref Range    Glucose (POC) 86 65 - 100 mg/dL   MAGNESIUM    Collection Time: 09/23/19  4:24 PM   Result Value Ref Range    Magnesium 2.1 1.8 - 2.4 mg/dL   POTASSIUM    Collection Time: 09/23/19  4:24 PM   Result Value Ref Range    Potassium 3.6 3.5 - 5.1 mmol/L   HGB & HCT    Collection Time: 09/23/19  4:24 PM   Result Value Ref Range    HGB 10.4 (L) 11.7 - 15.4 g/dL    HCT 31.9 (L) 35.8 - 46.3 %   GLUCOSE, POC    Collection Time: 09/23/19  6:07 PM   Result Value Ref Range    Glucose (POC) 106 (H) 65 - 100 mg/dL   GLUCOSE, POC    Collection Time: 09/23/19  7:44 PM   Result Value Ref Range    Glucose (POC) 116 (H) 65 - 100 mg/dL   HGB & HCT    Collection Time: 09/23/19  9:11 PM   Result Value Ref Range    HGB 10.6 (L) 11.7 - 15.4 g/dL    HCT 33.3 (L) 35.8 - 46.3 %   GLUCOSE, POC    Collection Time: 09/23/19  9:15 PM   Result Value Ref Range    Glucose (POC) 113 (H) 65 - 100 mg/dL   MAGNESIUM    Collection Time: 09/23/19  9:16 PM   Result Value Ref Range    Magnesium 1.9 1.8 - 2.4 mg/dL   POTASSIUM    Collection Time: 09/23/19  9:16 PM   Result Value Ref Range    Potassium 4.5 3.5 - 5.1 mmol/L   GLUCOSE, POC    Collection Time: 09/23/19 10:05 PM   Result Value Ref Range    Glucose (POC) 111 (H) 65 - 100 mg/dL   GLUCOSE, POC    Collection Time: 09/24/19 12:23 AM   Result Value Ref Range    Glucose (POC) 117 (H) 65 - 100 mg/dL   GLUCOSE, POC    Collection Time: 09/24/19  2:04 AM   Result Value Ref Range    Glucose (POC) 113 (H) 65 - 100 mg/dL   GLUCOSE, POC    Collection Time: 09/24/19  3:50 AM   Result Value Ref Range Glucose (POC) 111 (H) 65 - 100 mg/dL   MAGNESIUM    Collection Time: 09/24/19  3:52 AM   Result Value Ref Range    Magnesium 2.1 1.8 - 2.4 mg/dL   METABOLIC PANEL, BASIC    Collection Time: 09/24/19  3:52 AM   Result Value Ref Range    Sodium 141 136 - 145 mmol/L    Potassium 4.2 3.5 - 5.1 mmol/L    Chloride 110 (H) 98 - 107 mmol/L    CO2 25 21 - 32 mmol/L    Anion gap 6 (L) 7 - 16 mmol/L    Glucose 100 65 - 100 mg/dL    BUN 11 8 - 23 MG/DL    Creatinine 0.43 (L) 0.6 - 1.0 MG/DL    GFR est AA >60 >60 ml/min/1.73m2    GFR est non-AA >60 >60 ml/min/1.73m2    Calcium 8.1 (L) 8.3 - 10.4 MG/DL   CBC W/O DIFF    Collection Time: 09/24/19  3:52 AM   Result Value Ref Range    WBC 10.0 4.3 - 11.1 K/uL    RBC 3.05 (L) 4.05 - 5.2 M/uL    HGB 10.3 (L) 11.7 - 15.4 g/dL    HCT 31.9 (L) 35.8 - 46.3 %    .6 (H) 79.6 - 97.8 FL    MCH 33.8 (H) 26.1 - 32.9 PG    MCHC 32.3 31.4 - 35.0 g/dL    RDW 13.3 11.9 - 14.6 %    PLATELET 265 719 - 788 K/uL    MPV 11.0 9.4 - 12.3 FL    ABSOLUTE NRBC 0.00 0.0 - 0.2 K/uL   GLUCOSE, POC    Collection Time: 09/24/19  6:19 AM   Result Value Ref Range    Glucose (POC) 115 (H) 65 - 100 mg/dL   EKG, 12 LEAD, SUBSEQUENT    Collection Time: 09/24/19  7:22 AM   Result Value Ref Range    Ventricular Rate 76 BPM    Atrial Rate 76 BPM    P-R Interval 150 ms    QRS Duration 70 ms    Q-T Interval 418 ms    QTC Calculation (Bezet) 470 ms    Calculated P Axis 53 degrees    Calculated R Axis 33 degrees    Calculated T Axis 89 degrees    Diagnosis       Normal sinus rhythm  Nonspecific ST and T wave abnormality  Prolonged QT  Abnormal ECG  When compared with ECG of 23-SEP-2019 12:09,  ST no longer depressed in Lateral leads  Nonspecific T wave abnormality now evident in Lateral leads  Confirmed by Viviane Stoner MD (), MAX ELY ([id number]) on 9/24/2019 7:56:58 AM         Assessment/ Plan:      Active Problems:    CAD (coronary artery disease) (9/16/2019)      [facility] (6/21/2012) Overview: followed by PCP            HLD (hyperlipidemia) (6/21/2012)      Overview: followed by PCP            HTN (hypertension) (6/21/2012)      Overview: readings in target range      Encounter for weaning from ventilator [facility]) (9/23/2019)      Atelectasis (9/23/2019)      Hypoxia (9/23/2019)    DM type II - Add back SS. Actos    CAD s/p CABG - Cardiology as primary. Amiodarone, amlodipine, statin, BB.      Protonix    DVT prophylaxis - SCD  Signed By: Flory Dorman DO     September 24, 2019

## 2019-09-24 NOTE — PROGRESS NOTES
POD 1 Day Post-Op    Procedure:  Procedure(s):  CORONARY ARTERY BYPASS GRAFT (CABG X 3)/ LIMA  VEIN HARVEST/ GREATER SAPHENOUS  ESOPHAGEAL TRANS ECHOCARDIOGRAM      Subjective:     Patient has No significant medical complaints      Objective:     Patient Vitals for the past 8 hrs:   BP Temp Pulse Resp SpO2 Weight   19 0559 111/53  79 19 100 %    19 0459 116/56  80 22 99 % 170 lb 10.2 oz (77.4 kg)   19 0359 112/54 99.2 °F (37.3 °C) 82 23 94 %    19 0259 110/54  82 24 94 %    19 0159 114/59  84 (!) 36 96 %    19 0143 109/54  82 23 94 %    19 0132 110/56  83 26 94 %    19 0100   83 25 95 %    19 0030   84 16 95 %    19 0015   83 22 95 %      Temp (24hrs), Av.8 °F (37.1 °C), Min:97.5 °F (36.4 °C), Max:100.6 °F (38.1 °C)        Hemodynamics  PAP Systolic: 30 PAP  CO (l/min): 4.6 l/min CO  CI (l/min/m2): 2.7 l/min/m2 CI    No intake/output data recorded.    1901 -  0700  In: 5754.3 [I.V.:5384.3]  Out: 3424 [Urine:3155]    CT Drainage              total of all CT's    Heart:  regular rate and rhythm, S1, S2 normal, no murmur, click, rub or gallop  Lung:  clear to auscultation bilaterally  Neuro: Grossly non focal  Incisions: Clean, dry, and intact    Labs:  Recent Results (from the past 24 hour(s))   POC CG8I    Collection Time: 19  9:34 AM   Result Value Ref Range    pH (POC) 7.428 7.35 - 7.45      pCO2 (POC) 40.5 35 - 45 MMHG    pO2 (POC) 190 (H) 75 - 100 MMHG    HCO3 (POC) 26.8 (H) 22 - 26 MMOL/L    sO2 (POC) 100 (H) 95 - 98 %    Base excess (POC) 2 mmol/L    Sodium,  136 - 145 MMOL/L    Potassium, POC 3.6 3.5 - 5.1 MMOL/L    Glucose,  (H) 65 - 100 MG/DL    Calcium, ionized (POC) 1.23 1.12 - 1.32 mmol/L   POC CG8I    Collection Time: 19 10:09 AM   Result Value Ref Range    pH (POC) 7.392 7.35 - 7.45      pCO2 (POC) 40.2 35 - 45 MMHG    pO2 (POC) 50 (L) 75 - 100 MMHG    HCO3 (POC) 24.5 22 - 26 MMOL/L sO2 (POC) 85 (L) 95 - 98 %    Base excess (POC) 0 mmol/L    Sodium,  136 - 145 MMOL/L    Potassium, POC 4.4 3.5 - 5.1 MMOL/L    Glucose,  (H) 65 - 100 MG/DL    Calcium, ionized (POC) 1.15 1.12 - 1.32 mmol/L   POC CG8I    Collection Time: 09/23/19 10:35 AM   Result Value Ref Range    pH (POC) 7.417 7.35 - 7.45      pCO2 (POC) 38.8 35 - 45 MMHG    pO2 (POC) 224 (H) 75 - 100 MMHG    HCO3 (POC) 25.0 22 - 26 MMOL/L    sO2 (POC) 100 (H) 95 - 98 %    Base excess (POC) 0 mmol/L    Sodium,  136 - 145 MMOL/L    Potassium, POC 4.0 3.5 - 5.1 MMOL/L    Glucose,  (H) 65 - 100 MG/DL    Calcium, ionized (POC) 1.16 1.12 - 1.32 mmol/L   POC CG8I    Collection Time: 09/23/19 11:13 AM   Result Value Ref Range    pH (POC) 7.358 7.35 - 7.45      pCO2 (POC) 46.3 (H) 35 - 45 MMHG    pO2 (POC) 423 (H) 75 - 100 MMHG    HCO3 (POC) 26.1 (H) 22 - 26 MMOL/L    sO2 (POC) 100 (H) 95 - 98 %    Base excess (POC) 0 mmol/L    Sodium,  136 - 145 MMOL/L    Potassium, POC 3.7 3.5 - 5.1 MMOL/L    Glucose,  (H) 65 - 100 MG/DL    Calcium, ionized (POC) 1.16 1.12 - 1.32 mmol/L   GLUCOSE, POC    Collection Time: 09/23/19 12:08 PM   Result Value Ref Range    Glucose (POC) 137 (H) 65 - 100 mg/dL   EKG, 12 LEAD, INITIAL    Collection Time: 09/23/19 12:09 PM   Result Value Ref Range    Ventricular Rate 82 BPM    Atrial Rate 82 BPM    P-R Interval 156 ms    QRS Duration 84 ms    Q-T Interval 450 ms    QTC Calculation (Bezet) 525 ms    Calculated P Axis 68 degrees    Calculated R Axis 51 degrees    Calculated T Axis 15 degrees    Diagnosis       Normal sinus rhythm  Nonspecific ST and T wave abnormality  Prolonged QT  Abnormal ECG  When compared with ECG of 16-SEP-2019 08:28,  ST now depressed in Lateral leads  Nonspecific T wave abnormality no longer evident in Lateral leads  QT has lengthened  Confirmed by Kyleigh Cordero MD (), MAX ELY ([id number]) on 9/23/2019 1:02:27 PM     POC CG8I    Collection Time: 09/23/19 12:11 PM Result Value Ref Range    Device: VENT      FIO2 (POC) 50 %    pH (POC) 7.425 7.35 - 7.45      pCO2 (POC) 33.8 (L) 35 - 45 MMHG    pO2 (POC) 110 (H) 75 - 100 MMHG    HCO3 (POC) 22.2 22 - 26 MMOL/L    sO2 (POC) 98 95 - 98 %    Base deficit (POC) 2 mmol/L    Mode Pressure regulated volume control      Tidal volume 500 ml    Set Rate 16 bpm    PEEP/CPAP (POC) 8 cmH2O    Allens test (POC) NOT APPLICABLE      Site DRAWN FROM ARTERIAL LINE      Patient temp.  98.6      Specimen type (POC) ARTERIAL      Sodium,  136 - 145 MMOL/L    Potassium, POC 3.3 (L) 3.5 - 5.1 MMOL/L    Glucose,  (H) 65 - 100 MG/DL    Calcium, ionized (POC) 1.26 1.12 - 1.32 mmol/L    Performed by [employer]     Exhaled minute volume 8.00 L/min    COLLECT TIME 9,159     METABOLIC PANEL, BASIC    Collection Time: 09/23/19 12:22 PM   Result Value Ref Range    Sodium 145 136 - 145 mmol/L    Potassium 3.5 3.5 - 5.1 mmol/L    Chloride 112 (H) 98 - 107 mmol/L    CO2 25 21 - 32 mmol/L    Anion gap 8 7 - 16 mmol/L    Glucose 133 (H) 65 - 100 mg/dL    BUN 15 8 - 23 MG/DL    Creatinine 0.73 0.6 - 1.0 MG/DL    GFR est AA >60 >60 ml/min/1.73m2    GFR est non-AA >60 >60 ml/min/1.73m2    Calcium 8.3 8.3 - 10.4 MG/DL   MAGNESIUM    Collection Time: 09/23/19 12:22 PM   Result Value Ref Range    Magnesium 3.1 (H) 1.8 - 2.4 mg/dL   CBC W/O DIFF    Collection Time: 09/23/19 12:22 PM   Result Value Ref Range    WBC 13.0 (H) 4.3 - 11.1 K/uL    RBC 3.31 (L) 4.05 - 5.2 M/uL    HGB 11.1 (L) 11.7 - 15.4 g/dL    HCT 34.1 (L) 35.8 - 46.3 %    .0 (H) 79.6 - 97.8 FL    MCH 33.5 (H) 26.1 - 32.9 PG    MCHC 32.6 31.4 - 35.0 g/dL    RDW 12.8 11.9 - 14.6 %    PLATELET 290 255 - 422 K/uL    MPV 10.9 9.4 - 12.3 FL    ABSOLUTE NRBC 0.00 0.0 - 0.2 K/uL   PTT    Collection Time: 09/23/19 12:22 PM   Result Value Ref Range    aPTT 28.0 24.7 - 39.8 SEC   PROTHROMBIN TIME + INR    Collection Time: 09/23/19 12:22 PM   Result Value Ref Range    Prothrombin time 14.1 11.7 - 14.5 sec    INR 1.1     FIBRINOGEN    Collection Time: 09/23/19 12:22 PM   Result Value Ref Range    Fibrinogen 373 190 - 501 mg/dL   GLUCOSE, POC    Collection Time: 09/23/19  2:07 PM   Result Value Ref Range    Glucose (POC) 87 65 - 100 mg/dL   GLUCOSE, POC    Collection Time: 09/23/19  3:09 PM   Result Value Ref Range    Glucose (POC) 86 65 - 100 mg/dL   GLUCOSE, POC    Collection Time: 09/23/19  4:22 PM   Result Value Ref Range    Glucose (POC) 86 65 - 100 mg/dL   MAGNESIUM    Collection Time: 09/23/19  4:24 PM   Result Value Ref Range    Magnesium 2.1 1.8 - 2.4 mg/dL   POTASSIUM    Collection Time: 09/23/19  4:24 PM   Result Value Ref Range    Potassium 3.6 3.5 - 5.1 mmol/L   HGB & HCT    Collection Time: 09/23/19  4:24 PM   Result Value Ref Range    HGB 10.4 (L) 11.7 - 15.4 g/dL    HCT 31.9 (L) 35.8 - 46.3 %   GLUCOSE, POC    Collection Time: 09/23/19  6:07 PM   Result Value Ref Range    Glucose (POC) 106 (H) 65 - 100 mg/dL   GLUCOSE, POC    Collection Time: 09/23/19  7:44 PM   Result Value Ref Range    Glucose (POC) 116 (H) 65 - 100 mg/dL   HGB & HCT    Collection Time: 09/23/19  9:11 PM   Result Value Ref Range    HGB 10.6 (L) 11.7 - 15.4 g/dL    HCT 33.3 (L) 35.8 - 46.3 %   GLUCOSE, POC    Collection Time: 09/23/19  9:15 PM   Result Value Ref Range    Glucose (POC) 113 (H) 65 - 100 mg/dL   MAGNESIUM    Collection Time: 09/23/19  9:16 PM   Result Value Ref Range    Magnesium 1.9 1.8 - 2.4 mg/dL   POTASSIUM    Collection Time: 09/23/19  9:16 PM   Result Value Ref Range    Potassium 4.5 3.5 - 5.1 mmol/L   GLUCOSE, POC    Collection Time: 09/23/19 10:05 PM   Result Value Ref Range    Glucose (POC) 111 (H) 65 - 100 mg/dL   GLUCOSE, POC    Collection Time: 09/24/19 12:23 AM   Result Value Ref Range    Glucose (POC) 117 (H) 65 - 100 mg/dL   GLUCOSE, POC    Collection Time: 09/24/19  2:04 AM   Result Value Ref Range    Glucose (POC) 113 (H) 65 - 100 mg/dL   GLUCOSE, POC    Collection Time: 09/24/19  3:50 AM Result Value Ref Range    Glucose (POC) 111 (H) 65 - 100 mg/dL   MAGNESIUM    Collection Time: 09/24/19  3:52 AM   Result Value Ref Range    Magnesium 2.1 1.8 - 2.4 mg/dL   METABOLIC PANEL, BASIC    Collection Time: 09/24/19  3:52 AM   Result Value Ref Range    Sodium 141 136 - 145 mmol/L    Potassium 4.2 3.5 - 5.1 mmol/L    Chloride 110 (H) 98 - 107 mmol/L    CO2 25 21 - 32 mmol/L    Anion gap 6 (L) 7 - 16 mmol/L    Glucose 100 65 - 100 mg/dL    BUN 11 8 - 23 MG/DL    Creatinine 0.43 (L) 0.6 - 1.0 MG/DL    GFR est AA >60 >60 ml/min/1.73m2    GFR est non-AA >60 >60 ml/min/1.73m2    Calcium 8.1 (L) 8.3 - 10.4 MG/DL   CBC W/O DIFF    Collection Time: 09/24/19  3:52 AM   Result Value Ref Range    WBC 10.0 4.3 - 11.1 K/uL    RBC 3.05 (L) 4.05 - 5.2 M/uL    HGB 10.3 (L) 11.7 - 15.4 g/dL    HCT 31.9 (L) 35.8 - 46.3 %    .6 (H) 79.6 - 97.8 FL    MCH 33.8 (H) 26.1 - 32.9 PG    MCHC 32.3 31.4 - 35.0 g/dL    RDW 13.3 11.9 - 14.6 %    PLATELET 300 144 - 351 K/uL    MPV 11.0 9.4 - 12.3 FL    ABSOLUTE NRBC 0.00 0.0 - 0.2 K/uL   GLUCOSE, POC    Collection Time: 09/24/19  6:19 AM   Result Value Ref Range    Glucose (POC) 115 (H) 65 - 100 mg/dL   EKG, 12 LEAD, SUBSEQUENT    Collection Time: 09/24/19  7:22 AM   Result Value Ref Range    Ventricular Rate 76 BPM    Atrial Rate 76 BPM    P-R Interval 150 ms    QRS Duration 70 ms    Q-T Interval 418 ms    QTC Calculation (Bezet) 470 ms    Calculated P Axis 53 degrees    Calculated R Axis 33 degrees    Calculated T Axis 89 degrees    Diagnosis       Normal sinus rhythm  Nonspecific ST and T wave abnormality  Prolonged QT  Abnormal ECG  When compared with ECG of 23-SEP-2019 12:09,  ST no longer depressed in Lateral leads  Nonspecific T wave abnormality now evident in Lateral leads  Confirmed by [facility]  MD (), MAX ELY ([id number]) on 9/24/2019 7:56:58 AM         Assessment:     Active Problems:    CAD (coronary artery disease) (9/16/2019)      DM2 (diabetes mellitus, type 2) ([facility] [address] (6/21/2012)      Overview: followed by PCP            HLD (hyperlipidemia) (6/21/2012)      Overview: followed by PCP            HTN (hypertension) (6/21/2012)      Overview: readings in target range      Encounter for weaning from ventilator [facility]) (9/23/2019)      Atelectasis (9/23/2019)      Hypoxia (9/23/2019)        Plan/Recommendations/Medical Decision Making:     Discontinue:  chest tubes    See orders    Stable, to floor, protocol

## 2019-09-24 NOTE — PROGRESS NOTES
BSR given to incoming RN. Updated on events of evening. In recliner this am. Walked 30 ft without difficulty.

## 2019-09-24 NOTE — PROGRESS NOTES
TRANSFER - IN REPORT:    Verbal report received from [facility] L[facility]) on Elena Live  being received from [facility] ICU(unit) for routine post - op      Report consisted of patients Situation, Background, Assessment and   Recommendations(SBAR). Information from the following report(s) SBAR, Intake/Output and Cardiac Rhythm NSR was reviewed with the receiving nurse. Opportunity for questions and clarification was provided. Assessment completed upon patients arrival to unit and care assumed. Dual skin assessment completed with RN. Allevyn removed blanchable redness noted at sacral area. Prevena intact MS, Ace wrap LLE.

## 2019-09-24 NOTE — PROGRESS NOTES
Critical Care Daily Progress Note: 9/24/2019    Adrianna Fernandez Danger   Admission Date: 9/16/2019         The patient's chart is reviewed and the patient is discussed with the staff.     75 y old WF, s/p CABG x 3       Subjective:     Extubated last night  On 3L    Current Facility-Administered Medications   Medication Dose Route Frequency    0.9% sodium chloride infusion  25 mL/hr IntraVENous CONTINUOUS    dextrose 5% - 0.45% NaCl with KCl 20 mEq/L infusion  25 mL/hr IntraVENous CONTINUOUS    sodium chloride (NS) flush 5-40 mL  5-40 mL IntraVENous Q8H    sodium chloride (NS) flush 5-40 mL  5-40 mL IntraVENous PRN    oxyCODONE-acetaminophen (PERCOCET) 5-325 mg per tablet 1 Tab  1 Tab Oral Q4H PRN    morphine 10 mg/ml injection 3-5 mg  3-5 mg IntraVENous Q1H PRN    naloxone (NARCAN) injection 0.4 mg  0.4 mg IntraVENous PRN    mupirocin (BACTROBAN) 2 % ointment   Both Nostrils BID    sodium bicarbonate (8.4%) injection 50 mEq  50 mEq IntraVENous PRN    EPINEPHrine (ADRENALIN) 4 mg in 0.9% sodium chloride 250 mL infusion  0.05-0.1 mcg/kg/min IntraVENous TITRATE    nitroglycerin (Tridil) 200 mcg/ml infusion   mcg/min IntraVENous TITRATE    lidocaine (PF) (XYLOCAINE) 100 mg/5 mL (2 %) injection syringe  mg   mg IntraVENous ONCE PRN    amiodarone (CORDARONE) tablet 200 mg  200 mg Oral Q12H    metoprolol tartrate (LOPRESSOR) tablet 25 mg  25 mg Oral Q12H    atorvastatin (LIPITOR) tablet 80 mg  80 mg Oral QHS    insulin regular (NOVOLIN R, HUMULIN R) 100 Units in 0.9% sodium chloride 100 mL infusion  1 Units/hr IntraVENous TITRATE    dextrose (D50W) injection syrg 12.5 g  25 mL IntraVENous PRN    magnesium sulfate 1 g/100 ml IVPB (premix or compounded)  1 g IntraVENous PRN    potassium chloride 10 mEq in 50 ml IVPB  10 mEq IntraVENous PRN    midazolam (VERSED) injection 1 mg  1 mg IntraVENous Q1H PRN    propofol (DIPRIVAN) infusion  0-50 mcg/kg/min IntraVENous TITRATE    meperidine (DEMEROL) injection 25 mg  25 mg IntraVENous Q1H PRN    chlorhexidine (PERIDEX) 0.12 % mouthwash 10 mL  10 mL Oral BID    tuberculin injection 5 Units  5 Units IntraDERMal ONCE    PHENYLephrine (PF)(JOHNATHAN-SYNEPHRINE) 30 mg in 0.9% sodium chloride 250 mL infusion   mcg/min IntraVENous TITRATE    alum-mag hydroxide-simeth (MYLANTA) oral suspension 30 mL  30 mL Oral Q4H PRN    heparin (porcine) 25,000 Units in 0.9% sodium chloride 500 mL infusion  12-25 Units/kg/hr IntraVENous CONTINUOUS    [Held by provider] insulin glargine (LANTUS) injection 50 Units  50 Units SubCUTAneous QHS    [Held by provider] insulin lispro (HUMALOG) injection 15 Units  15 Units SubCUTAneous TIDAC    magnesium oxide (MAG-OX) tablet 400 mg  400 mg Oral DAILY    diphenhydrAMINE (BENADRYL) capsule 25 mg  25 mg Oral Q6H PRN    acetaminophen (TYLENOL) tablet 650 mg  650 mg Oral Q4H PRN    amLODIPine (NORVASC) tablet 2.5 mg  2.5 mg Oral DAILY    cyclobenzaprine (FLEXERIL) tablet 10 mg  10 mg Oral TID PRN    montelukast (SINGULAIR) tablet 10 mg  10 mg Oral DAILY    pantoprazole (PROTONIX) tablet 40 mg  40 mg Oral ACB    pioglitazone (ACTOS) tablet 30 mg  30 mg Oral DAILY    influenza vaccine 2019-20 (6 mos+)(PF) (FLUARIX/FLULAVAL/FLUZONE QUAD) injection 0.5 mL  0.5 mL IntraMUSCular PRIOR TO DISCHARGE    [Held by provider] insulin lispro (HUMALOG) injection   SubCUTAneous AC&HS       Review of Systems    Constitutional:  negative for fever, chills, sweats  Cardiovascular:  negative for chest pain, palpitations, syncope, edema  Gastrointestinal:  negative for dysphagia, reflux, vomiting, diarrhea, abdominal pain, or melena  Neurologic:  negative for focal weakness, numbness, headache      Objective:     Vitals:    09/24/19 0259 09/24/19 0359 09/24/19 0459 09/24/19 0559   BP: 110/54 112/54 116/56 111/53   Pulse: 82 82 80 79   Resp: 24 23 22 19   Temp:  99.2 °F (37.3 °C)     SpO2: 94% 94% 99% 100%   Weight:   170 lb 10.2 oz (77.4 kg)    Height:             Intake/Output Summary (Last 24 hours) at 9/24/2019 0560  Last data filed at 9/24/2019 8162  Gross per 24 hour   Intake 5565.94 ml   Output 2924 ml   Net 2641.94 ml       Physical Exam:          Constitutional:  the patient is well developed and in no acute distress  EENMT:  Sclera clear, pupils equal, oral mucosa moist  Respiratory: clear  Cardiovascular:  RRR without M,G,R  Gastrointestinal: soft and non-tender; with positive bowel sounds. Musculoskeletal: warm without cyanosis. There is no lower extremity edema.   Skin:  no jaundice or rashes, surgicaL wounds   Neurologic: no gross neuro deficits     Psychiatric:  alert and oriented x 3    LINES:  RIJ cordis, CT, Echavarria     DRIPS:  Insulin gtt    CXR:       LAB  Recent Labs     09/24/19  0619 09/24/19  0350 09/24/19  0204 09/24/19  0023 09/23/19  2205   GLUCPOC 115* 111* 113* 117* 111*      Recent Labs     09/24/19  0352 09/23/19 2111 09/23/19  1624 09/23/19  1222 09/22/19  0401   WBC 10.0  --   --  13.0* 7.1   HGB 10.3* 10.6* 10.4* 11.1* 13.3   HCT 31.9* 33.3* 31.9* 34.1* 39.8     --   --  164 229   INR  --   --   --  1.1  --      Recent Labs     09/24/19  0352 09/23/19 2116 09/23/19  1624 09/23/19  1222     --   --  145   K 4.2 4.5 3.6 3.5   *  --   --  112*   CO2 25  --   --  25     --   --  133*   BUN 11  --   --  15   CREA 0.43*  --   --  0.73   MG 2.1 1.9 2.1 3.1*   CA 8.1*  --   --  8.3     Recent Labs     09/23/19  1211 09/23/19  1113 09/23/19  1035   PHI 7.425 7.358 7.417   PCO2I 33.8* 46.3* 38.8   PO2I 110* 423* 224*   HCO3I 22.2 26.1* 25.0       Assessment:  (Medical Decision Making)     Hospital Problems  Date Reviewed: 9/11/2019          Codes Class Noted POA    CAD (coronary artery disease) ICD-10-CM: I25.10  ICD-9-CM: 414.00  9/16/2019 Yes        Encounter for weaning from ventilator [facility])   extubated  ICD-10-CM: Z99.11  ICD-9-CM: V46.13  9/23/2019 Unknown        Atelectasis ICD-10-CM: J98.11  ICD-9-CM: 518.0  9/23/2019 Unknown        Hypoxia on 4L NC  ICD-10-CM: R09.02  ICD-9-CM: 799.02  9/23/2019 Unknown        DM2 (diabetes mellitus, type 2) ([facility] ICD-10-CM: E11.9  ICD-9-CM: 250.00  6/21/2012 Yes    Overview Signed 9/20/2016  9:58 AM by Lesa Morales     followed by PCP               HLD (hyperlipidemia) ICD-10-CM: E78.5  ICD-9-CM: 272.4  6/21/2012 Yes    Overview Signed 9/20/2016  9:59 AM by Lesa Morales     followed by PCP               HTN (hypertension) ICD-10-CM: I10  ICD-9-CM: 401.9  6/21/2012 Yes    Overview Signed 9/20/2016  9:58 AM by Lesa Morales     readings in target range                   Plan:  (Medical Decision Making)     -- IS, pulmonary toilet, mobilize, likely going to step down later today     More than 50% of the time documented was spent in face-to-face contact with the patient and in the care of the patient on the floor/unit where the patient is located.     Trell Ramos MD

## 2019-09-24 NOTE — PROGRESS NOTES
Problem: Mobility Impaired (Adult and Pediatric)  Goal: *Acute Goals and Plan of Care (Insert Text)  Description  LTG:  (1.)Ms. Lawyer Pham will move from supine to sit and sit to supine , scoot up and down and roll side to side with INDEPENDENT within 7 treatment day(s) from flat surface without handrail. (2.)Ms. Lawyer Pham will transfer from bed to chair and chair to bed with INDEPENDENT using the least restrictive device within 7 treatment day(s). (3.)Ms. Lawyer Pham will ambulate with MODIFIED INDEPENDENCE for 500+ feet with the least restrictive device within 7 treatment day(s) while maintaining normal vital signs. _____________________________________________________________________________________________   Outcome: Progressing Towards Goal     PHYSICAL THERAPY: Initial Assessment and AM 9/24/2019  INPATIENT: PT Visit Days : 1  Payor: Precious Witt / Plan: [address] [place] [address] HMO / Product Type: [employer] /       NAME/AGE/GENDER: Duane Corley is a 75 y.o. female   PRIMARY DIAGNOSIS: Abnormal nuclear stress test [R94.39]  CAD (coronary artery disease) [I25.10] <principal problem not specified>   <principal problem not specified>    Procedure(s) (LRB):  CORONARY ARTERY BYPASS GRAFT (CABG X 3)/ LIMA (N/A)  VEIN HARVEST/ GREATER SAPHENOUS (Left)  ESOPHAGEAL TRANS ECHOCARDIOGRAM (N/A)  1 Day Post-Op  ICD-10: Treatment Diagnosis:    Difficulty in walking, Not elsewhere classified (R26.2)   Precaution/Allergies:  Aspirin; Codeine; Keflex [cephalexin]; Pcn [penicillins]; and Sulfa dyne      ASSESSMENT:     Ms. Lawyer Pham presents with decreased bed mobility, transfers, ambulation, balance, activity tolerance, strength and overall general functional mobility s/p hospital admission with CABG x 3 on 9/23/19. Pt seen in CVICU, chest tube, ventura cathter, IV in place. Pt A & O x 3, states no pain at rest, appears drowsy, cues to open eyes during treatment. Pt presently on 2 L/min O2.  Pt states independent at baseline, uses cane at times on unlevel surfaces but mostly independent with ambulation. Pt drives, reports some falls at times but none in past 6 months. Pt lives with family. Pt in chair on arrival, MIN A for transfers and ambulation with upright walker for 60'. Pt required cues for walker safety, posture, pursed lip breathing. Pt complaining of chest pain with mobility. PT to follow for acute care needs to address deficits noted above. Discharge needs pending progress with activity. This section established at most recent assessment   PROBLEM LIST (Impairments causing functional limitations):  Decreased ADL/Functional Activities  Decreased Transfer Abilities  Decreased Ambulation Ability/Technique  Decreased Balance  Increased Pain  Decreased Activity Tolerance  Increased Fatigue  Decreased Power with Home Exercise Program   INTERVENTIONS PLANNED: (Benefits and precautions of physical therapy have been discussed with the patient.)  Balance Exercise  Bed Mobility  Family Education  Gait Training  Home Exercise Program (HEP)  Therapeutic Activites  Therapeutic Exercise/Strengthening  Transfer Training     TREATMENT PLAN: Frequency/Duration: twice daily for duration of hospital stay  Rehabilitation Potential For Stated Goals: Good     REHAB RECOMMENDATIONS (at time of discharge pending progress):    Placement: It is my opinion, based on this patient's performance to date, that Ms. Jenni Diamond may benefit from participating in 1-2 additional therapy sessions in order to continue to assess for rehab potential and then make recommendation for disposition at discharge.   Equipment:   None at this time              HISTORY:   History of Present Injury/Illness (Reason for Referral):  S/p CABG x 3, generalized weakness  Past Medical History/Comorbidities:   Ms. Jenni Diamond  has a past medical history of Anxiety and depression, Arthritis, CAD (coronary artery disease) (9/16/2019), Chest discomfort, Chronic back pain, COPD (chronic obstructive pulmonary disease) ([facility] DM2 (diabetes mellitus, type 2) ([facility] (6/21/2012), Frequent UTI, GERD (gastroesophageal reflux disease), H/O seasonal allergies, Heart murmur, Hiatal hernia, History of kidney stones, HLD (hyperlipidemia) (6/21/2012), HTN (hypertension) (6/21/2012), Hypothyroidism, Psychiatric disorder, and Varicose veins (6/21/2012). She also has no past medical history of Arrhythmia, Asthma, Autoimmune disease ([address] Cancer ([address] Chronic kidney disease, Difficult intubation, Heart failure ([address] Liver disease, Malignant hyperthermia due to anesthesia, Nausea & vomiting, Pseudocholinesterase deficiency, PUD (peptic ulcer disease), Seizures ([address] Sleep apnea, Stroke ([address] or Thromboembolus ([facility] Ms. Megan Norton  has a past surgical history that includes hx appendectomy (1976); hx cholecystectomy (1997); hx hernia repair (1997); hx tubal ligation (1976); hx hysterectomy (1982); hx gyn (1997); hx urological (1997); hx heart catheterization (2007); hx cyst removal (1973); hx orthopaedic (1985); hx cataract removal (2002 and 2006); hx mohs procedure; endoscopy, colon, diagnostic (2011); and hx breast biopsy. Social History/Living Environment:   Home Environment: Trailer/mobile home  # Steps to Enter: (ramp)  One/Two Story Residence: One story  Living Alone: No  Support Systems: Friends \ neighbors  Patient Expects to be Discharged to[de-identified] Private residence  Current DME Used/Available at Home: Glucometer, Cane, straight(doesnt alwaays use cane)  Tub or Shower Type: Shower  Prior Level of Function/Work/Activity:  Independent at baseline to [employer]  Personal Factors:          Sex:  female        Age:  75 y.o.         Overall Behavior:  agreeable, drowsy    Number of Personal Factors/Comorbidities that affect the Plan of Care:  Age, COPD, DM, CAD 3+: HIGH COMPLEXITY   EXAMINATION:   Most Recent Physical Functioning:   Gross Assessment:  AROM: Generally decreased, functional(B LE)  Strength: Generally decreased, functional(B LE)  Coordination: Generally decreased, functional               Posture:  Posture (WDL): Exceptions to WDL  Posture Assessment: Rounded shoulders  Balance:  Sitting: Intact  Standing: Impaired  Standing - Static: Fair  Standing - Dynamic : Fair Bed Mobility:  Supine to Sit: (up in chair)  Sit to Supine: (left up in chair)  Scooting: Minimum assistance; Moderate assistance(in chair due to height)  Wheelchair Mobility:     Transfers:  Sit to Stand: Minimum assistance  Stand to Sit: Minimum assistance  Gait:     Base of Support: Narrowed  Speed/Stephania: Slow  Step Length: Left shortened;Right shortened  Swing Pattern: Left symmetrical;Right symmetrical  Gait Abnormalities: Decreased step clearance  Distance (ft): 60 Feet (ft)  Assistive Device: (CVICU upright walker)  Ambulation - Level of Assistance: Minimal assistance  Interventions: Safety awareness training;Verbal cues      Body Structures Involved:  Heart  Lungs  Muscles Body Functions Affected:  Cardio  Respiratory  Movement Related Activities and Participation Affected:  General Tasks and Demands  Mobility  Self Care   Number of elements that affect the Plan of Care: 4+: HIGH COMPLEXITY   CLINICAL PRESENTATION:   Presentation: Evolving clinical presentation with changing clinical characteristics: MODERATE COMPLEXITY   CLINICAL DECISION MAKIN[address] [place], [place] [address] 6 Clicks   Basic Mobility Inpatient Short Form  How much difficulty does the patient currently have. .. Unable A Lot A Little None   1. Turning over in bed (including adjusting bedclothes, sheets and blankets)? ? 1   ? 2   ? 3   ? 4   2. Sitting down on and standing up from a chair with arms ( e.g., wheelchair, bedside commode, etc.)   ? 1   ? 2   ? 3   ? 4   3. Moving from lying on back to sitting on the side of the bed?   ? 1   ? 2   ? 3   ? 4   How much help from another person does the patient currently need. .. Total A Lot A Little None   4.   Moving to and from a bed to a chair (including a wheelchair)? ? 1   ? 2   ? 3   ? 4   5. Need to walk in hospital room? ? 1   ? 2   ? 3   ? 4   6. Climbing 3-5 steps with a railing? ? 1   ? 2   ? 3   ? 4   © 2007, Trustees of [address] [place] [address] [address] [address], under license to [employer]. All rights reserved      Score:  Initial: 15 Most Recent: X (Date: -- )    Interpretation of Tool:  Represents activities that are increasingly more difficult (i.e. Bed mobility, Transfers, Gait). Medical Necessity:     Patient is expected to demonstrate progress in strength, range of motion, balance and coordination   to decrease assistance required with overall functional mobility, transfers, ambulation   . Patient demonstrates good   rehab potential due to higher previous functional level. Reason for Services/Other Comments:  Patient continues to require present interventions due to patient's inability to perform bed mobility, transfers, ambulation safely and effectively   . Use of outcome tool(s) and clinical judgement create a POC that gives a: Clear prediction of patient's progress: LOW COMPLEXITY            TREATMENT:   (In addition to Assessment/Re-Assessment sessions the following treatments were rendered)   Pre-treatment Symptoms/Complaints:  \"I guess I am ready\"  Pain: Initial:   Pain Intensity 1: 0(at rest)  Post Session:  does not rate post session, appears comfortable in chair     Therapeutic Activity: (    10 min): Therapeutic activities including Chair transfers, Ambulation on level ground and walker safety, posture, pursed lip breathing  to improve mobility, strength, balance and coordination. Required minimal Safety awareness training;Verbal cues to promote static and dynamic balance in standing and promote coordination of bilateral, lower extremity(s).        Braces/Orthotics/Lines/Etc:   IV  ventura catheter  Chest tube   O2 Device: Nasal cannula  Treatment/Session Assessment:    Response to Treatment:  tolerated well  Interdisciplinary Collaboration:   Physical Therapist  Registered Nurse  After treatment position/precautions:   Up in chair  Bed alarm/tab alert on  Bed/Chair-wheels locked  Caregiver at bedside  Call light within reach  Family at bedside  Nurse at bedside   Compliance with Program/Exercises: Will assess as treatment progresses  Recommendations/Intent for next treatment session: \"Next visit will focus on advancements to more challenging activities\".   Total Treatment Duration:  PT Patient Time In/Time Out  Time In: 0955  Time Out: [address] Sophia Wade, PT

## 2019-09-24 NOTE — OP NOTES
[facility]  OPERATIVE REPORT    Name:  Albertina Do  MR#:  [id number]  :  1945  ACCOUNT #:  [de-identified]  DATE OF SERVICE:  2019    PREOPERATIVE DIAGNOSIS:  Severe multivessel atherosclerotic coronary artery disease. POSTOPERATIVE DIAGNOSIS:  Severe multivessel atherosclerotic coronary artery disease. PROCEDURE PERFORMED:   Coronary artery bypass grafting x3. Graft consistin. LIMA to the LAD. 2.  Reverse saphenous vein graft to OM1  3. Reverse saphenous vein graft to OM2. SURGEON:  Katheren Gitelman, MD    ASSISTANT:      ANESTHESIA:  Endotracheal.    COMPLICATIONS:  .    SPECIMENS REMOVED:  .    IMPLANTS:  .    ESTIMATED BLOOD LOSS:  Minimal.    OPERATIVE FINDINGS:  Saphenous vein 3 to 4 mm, LIMA 3 mm. Aorta was soft, no palpable plaque. LAD was chronically occluded, 1.4 mm, severe distal disease. OM1 is 1.2 mm, severe distal disease. OM2 is 1.5 mm, severe distal disease. INDICATION:  The patient is a very pleasant 16-year-old lady who presented with worsening exertional dyspnea. She underwent stress testing which was abnormal for anteroapical ischemia. Left heart cath showed severe multivessel disease including a chronically occluded LAD. DESCRIPTION OF PROCEDURE:  After informed consent was obtained for the above-mentioned procedure, the patient was then taken to the operating room. Appropriate monitoring lines were placed by the Anesthesia Department. After administration of general endotracheal anesthesia, the patient was prepped and draped in the usual fashion with Betadine scrub and paint and Ioban cardiovascular drapes. The chest was then entered through a standard mediastinotomy incision while simultaneous harvesting of peripheral conduit was undertaken. After harvesting the conduit, it was inspected and noted to be adequate. The incisions were subsequently closed in a 2-layer fashion of 3-0 and 4-0 Vicryl.   The left internal mammary harvest was then undertaken in pedicle fashion and was injected with Papaverine solution. After administration of heparin, the distal pedicle was incised and noted to bleed briskly. A thoracostomy tube was then placed. This was brought out through a separate stab incision inferiorly and affixed to the skin. The pericardium was then opened and tacked up into a pericardial well, revealing the heart. Pursestrings were then placed into the aorta, the right atrial appendage, and the right atrium. After adequate ACT was obtained, the patient was then cannulated through these pursestrings, at which point cardiopulmonary bypass was started. Target vessels were identified and marked. A crossclamp was then applied. Antegrade and retrograde cardioplegia was administered initially and then given intermittently throughout the case. A latticed heart support was placed to assist with the distal anastomoses, which were performed by using 7-0 Prolene for vein to artery anastomosis and 8-0 Prolene for artery-to-artery anastomosis. Proximal anastomoses to the aorta were also placed using 6-0 Prolene. At the conclusion of the final proximal anastomosis, the aorta and right ventricle were flushed of debris and the anastomosis was completed. The crossclamp was then removed. The heart then underwent meticulous de-airing. The patient was warmed and weaned from the cardiopulmonary bypass. The distal anastomoses were visualized and noted to be hemostatic. All grafts were dopplered and noted to have an excellent flow. The venous cannula was then removed. Ventricular and atrial pacing wires were then placed upon the heart, brought out through stab wounds inferiorly and affixed to the skin. A single straight mediastinal tube was placed, brought out through a separate stab incision inferiorly, and also affixed to the skin.   After meticulous hemostasis was made, the sternum was reapproximated with heavy gauge stainless steel wire.  The midline fascia was subsequently closed separately. Vicryl was then used in a running fashion for subcutaneous tissue and skin. Dressings were then applied to all wounds. The patient was then transported with monitors to the intensive care unit intubated and ventilated. All instruments and sponge counts were correct at the end of the case.       Karen Bernal MD      [id number]  D:  09/23/2019 12:14  T:  09/23/2019 23:28  JOB #:  [id number]

## 2019-09-24 NOTE — PROGRESS NOTES
Dr. Bang Squires paged with FSBS results of 236.  She ordered to follow the SSI dose to cover current Blood glucose

## 2019-09-24 NOTE — PROGRESS NOTES
[facility] CARDIOLOGY PROGRESS NOTE           9/24/2019 7:21 AM    Admit Date: 9/16/2019      Subjective:   Patient POD#1 after CABG yesterday. Notes sternotomy pain under improved control. Hemodynamically stable. In sinus rhythm. On 2 liters of oxygen. ROS:  Cardiovascular:  As noted above    Objective:      Vitals:    09/24/19 0259 09/24/19 0359 09/24/19 0459 09/24/19 0559   BP: 110/54 112/54 116/56 111/53   Pulse: 82 82 80 79   Resp: 24 23 22 19   Temp:  99.2 °F (37.3 °C)     SpO2: 94% 94% 99% 100%   Weight:   77.4 kg (170 lb 10.2 oz)    Height:           Physical Exam:  General-No Acute Distress  Neck- supple, no JVD  CV- regular rate and rhythm no MRG  Lung- clear bilaterally  Abd- soft, nontender, nondistended  Ext- trivial edema bilaterally. Skin- warm and dry      Data Review:   Recent Labs     09/24/19  0352 09/23/19 2116 09/23/19 2111 09/23/19  1222     --   --   --  145   K 4.2 4.5  --    < > 3.5   MG 2.1 1.9  --    < > 3.1*   BUN 11  --   --   --  15   CREA 0.43*  --   --   --  0.73     --   --   --  133*   WBC 10.0  --   --   --  13.0*   HGB 10.3*  --  10.6*   < > 11.1*   HCT 31.9*  --  33.3*   < > 34.1*     --   --   --  164   INR  --   --   --   --  1.1    < > = values in this interval not displayed. No results found for: ABDULAZIZ Rodriguez    Echo (9/17/19):  -  Left ventricle: Systolic function was normal. Ejection fraction was estimated in the range of 60 % to 65 %. There were no regional wall motion  abnormalities. Left ventricular diastolic function parameters were abnormal.  -  Right ventricle: There was mild pulmonary artery hypertension.  -  Aortic valve: The valve was trileaflet. Leaflets exhibited mild sclerosis. -  Mitral valve: There was mild regurgitation. -  Tricuspid valve: There was mild to moderate regurgitation.     Assessment/Plan:     Active Problems:    CAD (coronary artery disease) (9/16/2019)    Multi-vessel CAD at cath.  POD #1 after CABG. Normal pre-op EF. Allergic to Aspirin. Start Plavix when OK with CT surgery. DM2 (diabetes mellitus, type 2) ([facility] [address] (6/21/2012)    On insulin. Monitor glucose. HLD (hyperlipidemia) (6/21/2012)    ON Lipitor. HTN (hypertension) (6/21/2012)    BP controlled. oN Lopressor and Norvasc. Hypoxia (9/23/2019)    Incentive spirometry and wean oxygen as tolerated.                    Deni Palomino MD  9/24/2019 7:21 AM

## 2019-09-25 ENCOUNTER — APPOINTMENT (OUTPATIENT)
Dept: GENERAL RADIOLOGY | Age: 74
DRG: 234 | End: 2019-09-25
Attending: THORACIC SURGERY (CARDIOTHORACIC VASCULAR SURGERY)
Payer: MEDICARE

## 2019-09-25 PROBLEM — Z95.1 S/P CABG X 3: Status: ACTIVE | Noted: 2019-09-25

## 2019-09-25 LAB
ANION GAP SERPL CALC-SCNC: 5 MMOL/L (ref 7–16)
BUN SERPL-MCNC: 12 MG/DL (ref 8–23)
CALCIUM SERPL-MCNC: 8.7 MG/DL (ref 8.3–10.4)
CHLORIDE SERPL-SCNC: 102 MMOL/L (ref 98–107)
CO2 SERPL-SCNC: 29 MMOL/L (ref 21–32)
CREAT SERPL-MCNC: 0.45 MG/DL (ref 0.6–1)
ERYTHROCYTE [DISTWIDTH] IN BLOOD BY AUTOMATED COUNT: 13 % (ref 11.9–14.6)
GLUCOSE BLD STRIP.AUTO-MCNC: 175 MG/DL (ref 65–100)
GLUCOSE BLD STRIP.AUTO-MCNC: 178 MG/DL (ref 65–100)
GLUCOSE BLD STRIP.AUTO-MCNC: 190 MG/DL (ref 65–100)
GLUCOSE BLD STRIP.AUTO-MCNC: 227 MG/DL (ref 65–100)
GLUCOSE SERPL-MCNC: 177 MG/DL (ref 65–100)
HCT VFR BLD AUTO: 30.5 % (ref 35.8–46.3)
HGB BLD-MCNC: 9.8 G/DL (ref 11.7–15.4)
MAGNESIUM SERPL-MCNC: 1.8 MG/DL (ref 1.8–2.4)
MCH RBC QN AUTO: 33.7 PG (ref 26.1–32.9)
MCHC RBC AUTO-ENTMCNC: 32.1 G/DL (ref 31.4–35)
MCV RBC AUTO: 104.8 FL (ref 79.6–97.8)
MM INDURATION POC: 0 MM (ref 0–5)
NRBC # BLD: 0 K/UL (ref 0–0.2)
PLATELET # BLD AUTO: 166 K/UL (ref 150–450)
PMV BLD AUTO: 11.4 FL (ref 9.4–12.3)
POTASSIUM SERPL-SCNC: 4.3 MMOL/L (ref 3.5–5.1)
PPD POC: NEGATIVE NEGATIVE
RBC # BLD AUTO: 2.91 M/UL (ref 4.05–5.2)
SODIUM SERPL-SCNC: 136 MMOL/L (ref 136–145)
WBC # BLD AUTO: 11.7 K/UL (ref 4.3–11.1)

## 2019-09-25 PROCEDURE — 97530 THERAPEUTIC ACTIVITIES: CPT

## 2019-09-25 PROCEDURE — 74011250637 HC RX REV CODE- 250/637: Performed by: PHYSICIAN ASSISTANT

## 2019-09-25 PROCEDURE — 36415 COLL VENOUS BLD VENIPUNCTURE: CPT

## 2019-09-25 PROCEDURE — 65660000004 HC RM CVT STEPDOWN

## 2019-09-25 PROCEDURE — 74011636637 HC RX REV CODE- 636/637: Performed by: FAMILY MEDICINE

## 2019-09-25 PROCEDURE — 71046 X-RAY EXAM CHEST 2 VIEWS: CPT

## 2019-09-25 PROCEDURE — 99232 SBSQ HOSP IP/OBS MODERATE 35: CPT | Performed by: INTERNAL MEDICINE

## 2019-09-25 PROCEDURE — 80048 BASIC METABOLIC PNL TOTAL CA: CPT

## 2019-09-25 PROCEDURE — 83735 ASSAY OF MAGNESIUM: CPT

## 2019-09-25 PROCEDURE — 74011250637 HC RX REV CODE- 250/637: Performed by: THORACIC SURGERY (CARDIOTHORACIC VASCULAR SURGERY)

## 2019-09-25 PROCEDURE — 82962 GLUCOSE BLOOD TEST: CPT

## 2019-09-25 PROCEDURE — 85027 COMPLETE CBC AUTOMATED: CPT

## 2019-09-25 PROCEDURE — 77030012890

## 2019-09-25 PROCEDURE — 74011250636 HC RX REV CODE- 250/636: Performed by: THORACIC SURGERY (CARDIOTHORACIC VASCULAR SURGERY)

## 2019-09-25 RX ORDER — INSULIN LISPRO 100 [IU]/ML
INJECTION, SOLUTION INTRAVENOUS; SUBCUTANEOUS
Status: DISCONTINUED | OUTPATIENT
Start: 2019-09-25 | End: 2019-09-25

## 2019-09-25 RX ORDER — SCOLOPAMINE TRANSDERMAL SYSTEM 1 MG/1
1 PATCH, EXTENDED RELEASE TRANSDERMAL
Status: DISCONTINUED | OUTPATIENT
Start: 2019-09-25 | End: 2019-09-27 | Stop reason: HOSPADM

## 2019-09-25 RX ORDER — INSULIN LISPRO 100 [IU]/ML
5 INJECTION, SOLUTION INTRAVENOUS; SUBCUTANEOUS
Status: DISCONTINUED | OUTPATIENT
Start: 2019-09-25 | End: 2019-09-26

## 2019-09-25 RX ORDER — INSULIN LISPRO 100 [IU]/ML
0-10 INJECTION, SOLUTION INTRAVENOUS; SUBCUTANEOUS
Status: DISCONTINUED | OUTPATIENT
Start: 2019-09-25 | End: 2019-09-27 | Stop reason: HOSPADM

## 2019-09-25 RX ORDER — INSULIN GLARGINE 100 [IU]/ML
15 INJECTION, SOLUTION SUBCUTANEOUS
Status: DISCONTINUED | OUTPATIENT
Start: 2019-09-25 | End: 2019-09-26

## 2019-09-25 RX ADMIN — FAMOTIDINE 20 MG: 20 TABLET ORAL at 12:12

## 2019-09-25 RX ADMIN — Medication 10 ML: at 14:00

## 2019-09-25 RX ADMIN — INSULIN LISPRO 5 UNITS: 100 INJECTION, SOLUTION INTRAVENOUS; SUBCUTANEOUS at 10:10

## 2019-09-25 RX ADMIN — INSULIN LISPRO 5 UNITS: 100 INJECTION, SOLUTION INTRAVENOUS; SUBCUTANEOUS at 17:49

## 2019-09-25 RX ADMIN — SENNOSIDES, DOCUSATE SODIUM 2 TABLET: 50; 8.6 TABLET, FILM COATED ORAL at 12:12

## 2019-09-25 RX ADMIN — Medication 10 ML: at 22:02

## 2019-09-25 RX ADMIN — FAMOTIDINE 20 MG: 20 TABLET ORAL at 21:59

## 2019-09-25 RX ADMIN — ONDANSETRON 4 MG: 2 INJECTION INTRAMUSCULAR; INTRAVENOUS at 20:07

## 2019-09-25 RX ADMIN — FUROSEMIDE 40 MG: 40 TABLET ORAL at 12:13

## 2019-09-25 RX ADMIN — ONDANSETRON 4 MG: 2 INJECTION INTRAMUSCULAR; INTRAVENOUS at 02:55

## 2019-09-25 RX ADMIN — ONDANSETRON 4 MG: 2 INJECTION INTRAMUSCULAR; INTRAVENOUS at 10:04

## 2019-09-25 RX ADMIN — INSULIN LISPRO 2 UNITS: 100 INJECTION, SOLUTION INTRAVENOUS; SUBCUTANEOUS at 10:11

## 2019-09-25 RX ADMIN — INSULIN LISPRO 2 UNITS: 100 INJECTION, SOLUTION INTRAVENOUS; SUBCUTANEOUS at 13:11

## 2019-09-25 RX ADMIN — POTASSIUM CHLORIDE 10 MEQ: 10 TABLET, EXTENDED RELEASE ORAL at 12:12

## 2019-09-25 RX ADMIN — OXYCODONE HYDROCHLORIDE AND ACETAMINOPHEN 1 TABLET: 5; 325 TABLET ORAL at 05:56

## 2019-09-25 RX ADMIN — TRAMADOL HYDROCHLORIDE 50 MG: 50 TABLET, FILM COATED ORAL at 03:08

## 2019-09-25 RX ADMIN — MUPIROCIN: 20 OINTMENT TOPICAL at 22:06

## 2019-09-25 RX ADMIN — AMIODARONE HYDROCHLORIDE 200 MG: 200 TABLET ORAL at 12:12

## 2019-09-25 RX ADMIN — AMIODARONE HYDROCHLORIDE 200 MG: 200 TABLET ORAL at 21:59

## 2019-09-25 RX ADMIN — FERROUS SULFATE TAB 325 MG (65 MG ELEMENTAL FE) 325 MG: 325 (65 FE) TAB at 05:50

## 2019-09-25 RX ADMIN — LISINOPRIL 2.5 MG: 5 TABLET ORAL at 12:11

## 2019-09-25 RX ADMIN — MUPIROCIN: 20 OINTMENT TOPICAL at 12:15

## 2019-09-25 RX ADMIN — INSULIN GLARGINE 15 UNITS: 100 INJECTION, SOLUTION SUBCUTANEOUS at 22:06

## 2019-09-25 RX ADMIN — METFORMIN HYDROCHLORIDE 1000 MG: 500 TABLET ORAL at 12:12

## 2019-09-25 RX ADMIN — INSULIN LISPRO 5 UNITS: 100 INJECTION, SOLUTION INTRAVENOUS; SUBCUTANEOUS at 13:10

## 2019-09-25 RX ADMIN — Medication 10 ML: at 05:51

## 2019-09-25 RX ADMIN — INSULIN LISPRO 2 UNITS: 100 INJECTION, SOLUTION INTRAVENOUS; SUBCUTANEOUS at 17:48

## 2019-09-25 RX ADMIN — INSULIN LISPRO 4 UNITS: 100 INJECTION, SOLUTION INTRAVENOUS; SUBCUTANEOUS at 22:06

## 2019-09-25 NOTE — DIABETES MGMT
Patient s/p CABG x3. Blood glucose range yesterday 100-255 with pt receiving Lantus 10 units, Humalog 4 units, Regular 25 units, and Metformin 1000mg. This AM blood glucose 177. Reviewed pt current regimen: Lantus 10 units nightly and Regular SSI. Spoke with provider discussed pt glycemic control. New orders received to initiate prandial insulin Humalog 5 units with meals and change SSI to Humalog normal sensitivity scale.  New orders also received to increase pt basal insulin to Lantus 15 units as fasting blood glucose is not at goal.

## 2019-09-25 NOTE — PROGRESS NOTES
Problem: Mobility Impaired (Adult and Pediatric)  Goal: *Acute Goals and Plan of Care (Insert Text)  Description  LTG:  (1.)Ms. Daniel Diez will move from supine to sit and sit to supine , scoot up and down and roll side to side with INDEPENDENT within 7 treatment day(s) from flat surface without handrail. (2.)Ms. Daniel Diez will transfer from bed to chair and chair to bed with INDEPENDENT using the least restrictive device within 7 treatment day(s). (3.)Ms. Daniel Diez will ambulate with MODIFIED INDEPENDENCE for 500+ feet with the least restrictive device within 7 treatment day(s) while maintaining normal vital signs. _____________________________________________________________________________________________   Outcome: Progressing Towards Goal     PHYSICAL THERAPY: Daily Note and AM 9/25/2019  INPATIENT: PT Visit Days : 2  Payor: Roger Crabtree / Plan: [address] [place] [address] HMO / Product Type: [employer] /       NAME/AGE/GENDER: Natalie Grandchild is a 75 y.o. female   PRIMARY DIAGNOSIS: Abnormal nuclear stress test [R94.39]  CAD (coronary artery disease) [I25.10] S/P CABG x 3   S/P CABG x 3    Procedure(s) (LRB):  CORONARY ARTERY BYPASS GRAFT (CABG X 3)/ LIMA (N/A)  VEIN HARVEST/ GREATER SAPHENOUS (Left)  ESOPHAGEAL TRANS ECHOCARDIOGRAM (N/A)  2 Days Post-Op  ICD-10: Treatment Diagnosis:    · Difficulty in walking, Not elsewhere classified (R26.2)   Precaution/Allergies:  Aspirin; Codeine; Keflex [cephalexin]; Pcn [penicillins]; and Sulfa dyne      ASSESSMENT:     Ms. Daniel Diez presents with decreased bed mobility, transfers, ambulation, balance, activity tolerance, strength and overall general functional mobility s/p hospital admission with CABG x 3 on 9/23/19. Pt states independent at baseline, uses cane at times on unlevel surfaces but mostly independent with ambulation. Pt drives, reports some falls at times but none in past 6 months. Pt lives with family. Pt presently on 1 L/min O2.   Pt in chair on contact. CGA for transfers and ambulation with RW for 75'. Pt required cues for walker safety, posture. Ambulates extremely slowly. After about 39' O2 sat was checked on 1L/min and was 100%. RN notified and requested nasal cannula be removed. Pt returned to room and wanted to go to the BR. She sat on toilet with only VC's for safety. After voiding she amb back and sat in chair. She performed AP's and LAQ's x 10 each. PT to follow for acute care needs to address deficits noted above. Discharge needs pending progress with activity. This section established at most recent assessment   PROBLEM LIST (Impairments causing functional limitations):  1. Decreased ADL/Functional Activities  2. Decreased Transfer Abilities  3. Decreased Ambulation Ability/Technique  4. Decreased Balance  5. Increased Pain  6. Decreased Activity Tolerance  7. Increased Fatigue  8. Decreased Concepcion with Home Exercise Program   INTERVENTIONS PLANNED: (Benefits and precautions of physical therapy have been discussed with the patient.)  1. Balance Exercise  2. Bed Mobility  3. Family Education  4. Gait Training  5. Home Exercise Program (HEP)  6. Therapeutic Activites  7. Therapeutic Exercise/Strengthening  8. Transfer Training     TREATMENT PLAN: Frequency/Duration: twice daily for duration of hospital stay  Rehabilitation Potential For Stated Goals: Good     REHAB RECOMMENDATIONS (at time of discharge pending progress):    Placement: It is my opinion, based on this patient's performance to date, that Ms. Mark Arango may benefit from participating in 1-2 additional therapy sessions in order to continue to assess for rehab potential and then make recommendation for disposition at discharge.   Equipment:    None at this time              HISTORY:   History of Present Injury/Illness (Reason for Referral):  S/p CABG x 3, generalized weakness  Past Medical History/Comorbidities:   Ms. Mark Arango  has a past medical history of Anxiety and depression, Arthritis, CAD (coronary artery disease) (9/16/2019), Chest discomfort, Chronic back pain, COPD (chronic obstructive pulmonary disease) ([facility] [address] DM2 (diabetes mellitus, type 2) ([facility] [address] (6/21/2012), Frequent UTI, GERD (gastroesophageal reflux disease), H/O seasonal allergies, Heart murmur, Hiatal hernia, History of kidney stones, HLD (hyperlipidemia) (6/21/2012), HTN (hypertension) (6/21/2012), Hypothyroidism, Psychiatric disorder, and Varicose veins (6/21/2012). She also has no past medical history of Arrhythmia, Asthma, Autoimmune disease ([facility] [address] Cancer ([address] Chronic kidney disease, Difficult intubation, Heart failure ([address] Liver disease, Malignant hyperthermia due to anesthesia, Nausea & vomiting, Pseudocholinesterase deficiency, PUD (peptic ulcer disease), Seizures ([address] Sleep apnea, Stroke ([facility] [address] or Thromboembolus ([address] Ms. David Moncada  has a past surgical history that includes hx appendectomy (1976); hx cholecystectomy (1997); hx hernia repair (1997); hx tubal ligation (1976); hx hysterectomy (1982); hx gyn (1997); hx urological (1997); hx heart catheterization (2007); hx cyst removal (1973); hx orthopaedic (1985); hx cataract removal (2002 and 2006); hx mohs procedure; endoscopy, colon, diagnostic (2011); and hx breast biopsy. Social History/Living Environment:   Home Environment: Trailer/mobile home  # Steps to Enter: (ramp)  One/Two Story Residence: One story  Living Alone: No  Support Systems: Friends \ neighbors  Patient Expects to be Discharged to[de-identified] Private residence  Current DME Used/Available at Home: Glucometer, Cane, straight(doesnt alwaays use cane)  Tub or Shower Type: Shower  Prior Level of Function/Work/Activity:  Independent at baseline to [employer]  Personal Factors:          Sex:  female        Age:  75 y.o.         Overall Behavior:  agreeable, drowsy    Number of Personal Factors/Comorbidities that affect the Plan of Care:  Age, COPD, DM, CAD 3+: HIGH COMPLEXITY   EXAMINATION:   Most Recent Physical Functioning:   Gross Assessment:                  Posture:     Balance:  Sitting: Intact  Standing: Impaired  Standing - Static: Fair  Standing - Dynamic : Fair Bed Mobility:     Wheelchair Mobility:     Transfers:  Sit to Stand: Stand-by assistance  Stand to Sit: Stand-by assistance  Gait:     Base of Support: Narrowed  Speed/Stephania: Shuffled; Slow  Step Length: Left shortened;Right shortened  Gait Abnormalities: Decreased step clearance;Shuffling gait  Distance (ft): 75 Feet (ft)  Assistive Device: Walker, rolling  Ambulation - Level of Assistance: Contact guard assistance  Interventions: Safety awareness training;Verbal cues      Body Structures Involved:  1. Heart  2. Lungs  3. Muscles Body Functions Affected:  1. Cardio  2. Respiratory  3. Movement Related Activities and Participation Affected:  1. General Tasks and Demands  2. Mobility  3. Self Care   Number of elements that affect the Plan of Care: 4+: HIGH COMPLEXITY   CLINICAL PRESENTATION:   Presentation: Evolving clinical presentation with changing clinical characteristics: MODERATE COMPLEXITY   CLINICAL DECISION MAKIN [facility] Inpatient Short Form  How much difficulty does the patient currently have. .. Unable A Lot A Little None   1. Turning over in bed (including adjusting bedclothes, sheets and blankets)? ? 1   ? 2   ? 3   ? 4   2. Sitting down on and standing up from a chair with arms ( e.g., wheelchair, bedside commode, etc.)   ? 1   ? 2   ? 3   ? 4   3. Moving from lying on back to sitting on the side of the bed?   ? 1   ? 2   ? 3   ? 4   How much help from another person does the patient currently need. .. Total A Lot A Little None   4. Moving to and from a bed to a chair (including a wheelchair)? ? 1   ? 2   ? 3   ? 4   5. Need to walk in hospital room? ? 1   ? 2   ? 3   ? 4   6. Climbing 3-5 steps with a railing?    ? 1   ? 2   ? 3   ? 4   © 2007, [address] [address], under license to [employer]. All rights reserved      Score:  Initial: 15 Most Recent: X (Date: -- )    Interpretation of Tool:  Represents activities that are increasingly more difficult (i.e. Bed mobility, Transfers, Gait). Medical Necessity:     · Patient is expected to demonstrate progress in strength, range of motion, balance and coordination  ·  to decrease assistance required with overall functional mobility, transfers, ambulation   · .  · Patient demonstrates good  ·  rehab potential due to higher previous functional level. Reason for Services/Other Comments:  · Patient continues to require present interventions due to patient's inability to perform bed mobility, transfers, ambulation safely and effectively   · . Use of outcome tool(s) and clinical judgement create a POC that gives a: Clear prediction of patient's progress: LOW COMPLEXITY            TREATMENT:   (In addition to Assessment/Re-Assessment sessions the following treatments were rendered)   Pre-treatment Symptoms/Complaints:  \"I guess I am ready\"  Pain: Initial:      Post Session:  does not rate post session, appears comfortable in chair     Therapeutic Activity: (    23 min): Therapeutic activities including Chair and toilet transfers, Ambulation on level ground and walker safety, posture, LE ex  to improve mobility, strength, balance and coordination. Required minimal Safety awareness training;Verbal cues to promote static and dynamic balance in standing and promote coordination of bilateral, lower extremity(s).        Braces/Orthotics/Lines/Etc:   · O2 Device: Nasal cannula  Treatment/Session Assessment:    · Response to Treatment:  tolerated well  · Interdisciplinary Collaboration:   o Physical Therapy Assistant  o Registered Nurse  · After treatment position/precautions:   o Up in chair  o Bed alarm/tab alert on  o Bed/Chair-wheels locked  o Caregiver at bedside  o Call light within reach  o RN notified  o Family at bedside   · Compliance with Program/Exercises: Will assess as treatment progresses  · Recommendations/Intent for next treatment session: \"Next visit will focus on advancements to more challenging activities\".   Total Treatment Duration:  PT Patient Time In/Time Out  Time In: 1137  Time Out: [address] Sophia Hanna, JOSE ANTONIO

## 2019-09-25 NOTE — PROGRESS NOTES
Critical Care Daily Progress Note: 9/25/2019    Adrianna Hendricks   Admission Date: 9/16/2019         The patient's chart is reviewed and the patient is discussed with the staff. 75 y old WF, s/p CABG x 3       Subjective:     Transferred to stepdown. Down to 1L NC. Nausea, no dyspnea.      Current Facility-Administered Medications   Medication Dose Route Frequency    insulin glargine (LANTUS) injection 15 Units  15 Units SubCUTAneous QHS    insulin lispro (HUMALOG) injection 5 Units  5 Units SubCUTAneous TIDAC    insulin lispro (HUMALOG) injection 0-10 Units  0-10 Units SubCUTAneous AC&HS    scopolamine (TRANSDERM-SCOP) 1 mg over 3 days 1 Patch  1 Patch TransDERmal Q72H    ferrous sulfate tablet 325 mg  325 mg Oral ACB    metFORMIN (GLUCOPHAGE) tablet 1,000 mg  1,000 mg Oral BID WITH MEALS    sodium chloride (NS) flush 5-40 mL  5-40 mL IntraVENous Q8H    sodium chloride (NS) flush 5-40 mL  5-40 mL IntraVENous PRN    furosemide (LASIX) tablet 40 mg  40 mg Oral DAILY    alum-mag hydroxide-simeth (MYLANTA) oral suspension 30 mL  30 mL Oral Q4H PRN    famotidine (PEPCID) tablet 20 mg  20 mg Oral Q12H    ondansetron (ZOFRAN) injection 4 mg  4 mg IntraVENous Q6H PRN    acetaminophen (TYLENOL) tablet 650 mg  650 mg Oral Q4H PRN    atorvastatin (LIPITOR) tablet 80 mg  80 mg Oral QHS    amiodarone (CORDARONE) tablet 200 mg  200 mg Oral Q12H    mupirocin (BACTROBAN) 2 % ointment   Both Nostrils BID    magnesium oxide (MAG-OX) tablet 400 mg  400 mg Oral TID PRN    magnesium oxide (MAG-OX) tablet 400 mg  400 mg Oral QID PRN    potassium chloride (KLOR-CON) tablet 10 mEq  10 mEq Oral DAILY    potassium chloride (K-DUR, KLOR-CON) SR tablet 20 mEq  20 mEq Oral BID PRN    potassium chloride (K-DUR, KLOR-CON) SR tablet 40 mEq  40 mEq Oral BID PRN    lisinopril (PRINIVIL, ZESTRIL) tablet 2.5 mg  2.5 mg Oral DAILY    nitroglycerin (NITROSTAT) tablet 0.4 mg  0.4 mg SubLINGual Q5MIN PRN    traMADol (ULTRAM) tablet 50 mg  50 mg Oral Q6H PRN    influenza vaccine 2019-20 (6 mos+)(PF) (FLUARIX/FLULAVAL/FLUZONE QUAD) injection 0.5 mL  0.5 mL IntraMUSCular PRIOR TO DISCHARGE    senna-docusate (PERICOLACE) 8.6-50 mg per tablet 2 Tab  2 Tab Oral BID    oxyCODONE-acetaminophen (PERCOCET) 5-325 mg per tablet 1 Tab  1 Tab Oral Q4H PRN    cyclobenzaprine (FLEXERIL) tablet 10 mg  10 mg Oral TID PRN    [Held by provider] pioglitazone (ACTOS) tablet 30 mg  30 mg Oral DAILY     Review of Systems  Constitutional:  negative for fever, chills, sweats  Cardiovascular:  negative for chest pain, palpitations, syncope, edema  Gastrointestinal:  negative for dysphagia, reflux, vomiting, diarrhea, abdominal pain, or melena  Neurologic:  negative for focal weakness, numbness, headache    Objective:     Vitals:    09/24/19 1821 09/24/19 2254 09/25/19 0303 09/25/19 0720   BP: 100/48 116/56 123/58 95/55   Pulse: 73 77 80 68   Resp: 18 20 12 14   Temp: 97.8 °F (36.6 °C) 97.2 °F (36.2 °C) 96.6 °F (35.9 °C) 97 °F (36.1 °C)   SpO2:  98% 97% 98%   Weight:   163 lb 1.6 oz (74 kg)    Height:           Intake/Output Summary (Last 24 hours) at 9/25/2019 1135  Last data filed at 9/25/2019 5899  Gross per 24 hour   Intake 390 ml   Output 1440 ml   Net -1050 ml     Physical Exam:          Constitutional:  the patient is well developed and in no acute distress  EENMT:  Sclera clear, pupils equal, oral mucosa moist  Respiratory: clear  Cardiovascular:  RRR without M,G,R  Gastrointestinal: soft and non-tender; with positive bowel sounds. Musculoskeletal: warm without cyanosis. There is no lower extremity edema.   Skin:  no jaundice or rashes, surgicaL wounds   Neurologic: no gross neuro deficits     Psychiatric:  alert and oriented x 3    CXR: 9/25: tiny L effusion, otherwise clear       9/24      LAB  Recent Labs     09/25/19  0553 09/24/19  2016 09/24/19  1653 09/24/19  1406 09/24/19  1255   GLUCPOC 175* 210* 183* 238* 255*      Recent Labs     09/25/19  0623 09/24/19  0352 09/23/19  2111  09/23/19  1222   WBC 11.7* 10.0  --   --  13.0*   HGB 9.8* 10.3* 10.6*   < > 11.1*   HCT 30.5* 31.9* 33.3*   < > 34.1*    162  --   --  164   INR  --   --   --   --  1.1    < > = values in this interval not displayed. Recent Labs     09/25/19  0623 09/24/19  0352 09/23/19 2116 09/23/19  1222    141  --   --  145   K 4.3 4.2 4.5   < > 3.5    110*  --   --  112*   CO2 29 25  --   --  25   * 100  --   --  133*   BUN 12 11  --   --  15   CREA 0.45* 0.43*  --   --  0.73   MG 1.8 2.1 1.9   < > 3.1*   CA 8.7 8.1*  --   --  8.3    < > = values in this interval not displayed.      Recent Labs     09/23/19  1211 09/23/19  1113 09/23/19  1035   PHI 7.425 7.358 7.417   PCO2I 33.8* 46.3* 38.8   PO2I 110* 423* 224*   HCO3I 22.2 26.1* 25.0     Assessment:  (Medical Decision Making)     Hospital Problems  Date Reviewed: 9/11/2019          Codes Class Noted POA    CAD (coronary artery disease) ICD-10-CM: I25.10  ICD-9-CM: 414.00  9/16/2019 Yes        Encounter for weaning from ventilator [facility])   extubated  ICD-10-CM: Z99.11  ICD-9-CM: V46.13  9/23/2019 Unknown        Atelectasis ICD-10-CM: J98.11  ICD-9-CM: 518.0  9/23/2019 Unknown        Hypoxia on 4L NC  ICD-10-CM: R09.02  ICD-9-CM: 799.02  9/23/2019 Unknown        DM2 (diabetes mellitus, type 2) [facility] ICD-10-CM: E11.9  ICD-9-CM: 250.00  6/21/2012 Yes    Overview Signed 9/20/2016  9:58 AM by Rodney Helm     followed by PCP               HLD (hyperlipidemia) ICD-10-CM: E78.5  ICD-9-CM: 272.4  6/21/2012 Yes    Overview Signed 9/20/2016  9:59 AM by Rodney Helm     followed by PCP               HTN (hypertension) ICD-10-CM: I10  ICD-9-CM: 401.9  6/21/2012 Yes    Overview Signed 9/20/2016  9:58 AM by Rodney Helm     readings in target range                 Plan:  (Medical Decision Making)     -- IS, pulmonary toilet, mobilize  -- wean O2 as tolerated  -- lasix per protocol   -- pain control and nausea therapy for comfort    More than 50% of the time documented was spent in face-to-face contact with the patient and in the care of the patient on the floor/unit where the patient is located.     López Chan MD

## 2019-09-25 NOTE — PROGRESS NOTES
Problem: Falls - Risk of  Goal: *Absence of Falls  Description  Document Candida Lightning Fall Risk and appropriate interventions in the flowsheet. Outcome: Progressing Towards Goal  Note:   Fall Risk Interventions:  Mobility Interventions: Communicate number of staff needed for ambulation/transfer, Patient to call before getting OOB    Mentation Interventions: Adequate sleep, hydration, pain control, Bed/chair exit alarm    Medication Interventions: Bed/chair exit alarm, Patient to call before getting OOB    Elimination Interventions: Call light in reach, Bed/chair exit alarm    History of Falls Interventions: Bed/chair exit alarm         Problem: Pressure Injury - Risk of  Goal: *Prevention of pressure injury  Description  Document Tobi Scale and appropriate interventions in the flowsheet.   Outcome: Progressing Towards Goal  Note:   Pressure Injury Interventions:  Sensory Interventions: Assess changes in LOC, Keep linens dry and wrinkle-free, Maintain/enhance activity level, Minimize linen layers         Activity Interventions: Increase time out of bed, Pressure redistribution bed/mattress(bed type)    Mobility Interventions: Pressure redistribution bed/mattress (bed type)    Nutrition Interventions: Document food/fluid/supplement intake    Friction and Shear Interventions: Minimize layers

## 2019-09-25 NOTE — PROGRESS NOTES
Pacing wires pulled per [place], [place]. Patient instructed to one hour bedrest with every 15 minute blood pressure checks for one hour. Pt voices understanding.

## 2019-09-25 NOTE — PROGRESS NOTES
Bedside report given to Glenn Yoo [facility]. Opportunity for questions, concerns. Patient involved.

## 2019-09-25 NOTE — PROGRESS NOTES
Pt complains for leg cramps. [facility] said that since electrolytes were WNL to make sure the patient is drinking enough. Also, mustard could be used to help with cramps. Pt notified. Will continue to monitor.

## 2019-09-25 NOTE — PROGRESS NOTES
9/25/2019 5:39 PM    Admit Date: 9/16/2019    Admit Diagnosis: Abnormal nuclear stress test [R94.39];CAD (coronary artery disease) [I25.10]      Subjective:   No sob- cp appropriate      Objective:      Visit Vitals  /54   Pulse 73   Temp 97 °F (36.1 °C)   Resp 16   Ht 5' 2\" (1.575 m)   Wt 74 kg (163 lb 1.6 oz)   SpO2 96%   Breastfeeding? No   BMI 29.83 kg/m²       Physical Exam:  Sandy Boby, Well Nourished, No Acute Distress, Alert & Oriented x 3, appropriate mood. Neck- supple, no JVD  CV- regular rate and rhythm no MRG  Lung- clear bilaterally  Abd- soft, nontender, nondistended  Ext- no edema bilaterally. Skin- warm and dry        Data Review:   Recent Labs     09/25/19  0623  09/23/19  1222      < > 145   K 4.3   < > 3.5   BUN 12   < > 15   CREA 0.45*   < > 0.73   WBC 11.7*   < > 13.0*   HGB 9.8*   < > 11.1*   HCT 30.5*   < > 34.1*      < > 164   INR  --   --  1.1    < > = values in this interval not displayed. Assessment/Plan:     Principal Problem:    S/P CABG x 3 (9/25/2019)Stable. Continue current medical therapy. ASA allergic - add plavix when ok with CTS    Active Problems:    CAD (coronary artery disease) (9/16/2019)      DM2 (diabetes mellitus[facility] type 2) ([facility] (6/21/2012)      Overview: followed by PCP            HLD (hyperlipidemia) (6/21/2012)Stable. Continue current medical therapy.         Overview: followed by PCP            HTN (hypertension) (6/21/2012)      Overview: readings in target range      Encounter for weaning from ventilator [facility]) (9/23/2019)      Atelectasis (9/23/2019)      Hypoxia (9/23/2019)

## 2019-09-25 NOTE — PROGRESS NOTES
Problem: Mobility Impaired (Adult and Pediatric)  Goal: *Acute Goals and Plan of Care (Insert Text)  Description  LTG:  (1.)Ms. Niraj Newman will move from supine to sit and sit to supine , scoot up and down and roll side to side with INDEPENDENT within 7 treatment day(s) from flat surface without handrail. (2.)Ms. Niraj Newman will transfer from bed to chair and chair to bed with INDEPENDENT using the least restrictive device within 7 treatment day(s). (3.)Ms. Niraj Newman will ambulate with MODIFIED INDEPENDENCE for 500+ feet with the least restrictive device within 7 treatment day(s) while maintaining normal vital signs. _____________________________________________________________________________________________   Outcome: Progressing Towards Goal     PHYSICAL THERAPY: Daily Note and PM 9/25/2019  INPATIENT: PT Visit Days : 2  Payor: Guido Vivas / Plan: [address] [place] [address] HMO / Product Type: [employer] /       NAME/AGE/GENDER: Abel Mendez is a 75 y.o. female   PRIMARY DIAGNOSIS: Abnormal nuclear stress test [R94.39]  CAD (coronary artery disease) [I25.10] S/P CABG x 3   S/P CABG x 3    Procedure(s) (LRB):  CORONARY ARTERY BYPASS GRAFT (CABG X 3)/ LIMA (N/A)  VEIN HARVEST/ GREATER SAPHENOUS (Left)  ESOPHAGEAL TRANS ECHOCARDIOGRAM (N/A)  2 Days Post-Op  ICD-10: Treatment Diagnosis:    · Difficulty in walking, Not elsewhere classified (R26.2)   Precaution/Allergies:  Aspirin; Codeine; Keflex [cephalexin]; Pcn [penicillins]; and Sulfa dyne      ASSESSMENT:     Ms. Niraj Newman presents with decreased bed mobility, transfers, ambulation, balance, activity tolerance, strength and overall general functional mobility s/p hospital admission with CABG x 3 on 9/23/19. Pt states independent at baseline, uses cane at times on unlevel surfaces but mostly independent with ambulation. Pt drives, reports some falls at times but none in past 6 months. Pt lives with family. Pt presently in chair on contact.   CGA for transfers and ambulation with RW for 75'. Pt required cues for walker safety, posture. Ambulates extremely slowly. After about 45' O2 sat was checked on RA and was 95%. Pt returned to room and sat in chair. She performed bilateral LE ex as listed below. PT to follow for acute care needs to address deficits noted above. Discharge needs pending progress with activity. This section established at most recent assessment   PROBLEM LIST (Impairments causing functional limitations):  1. Decreased ADL/Functional Activities  2. Decreased Transfer Abilities  3. Decreased Ambulation Ability/Technique  4. Decreased Balance  5. Increased Pain  6. Decreased Activity Tolerance  7. Increased Fatigue  8. Decreased [place] with Home Exercise Program   INTERVENTIONS PLANNED: (Benefits and precautions of physical therapy have been discussed with the patient.)  1. Balance Exercise  2. Bed Mobility  3. Family Education  4. Gait Training  5. Home Exercise Program (HEP)  6. Therapeutic Activites  7. Therapeutic Exercise/Strengthening  8. Transfer Training     TREATMENT PLAN: Frequency/Duration: twice daily for duration of hospital stay  Rehabilitation Potential For Stated Goals: Good     REHAB RECOMMENDATIONS (at time of discharge pending progress):    Placement: It is my opinion, based on this patient's performance to date, that Ms. Tellez may benefit from participating in 1-2 additional therapy sessions in order to continue to assess for rehab potential and then make recommendation for disposition at discharge.   Equipment:    None at this time              HISTORY:   History of Present Injury/Illness (Reason for Referral):  S/p CABG x 3, generalized weakness  Past Medical History/Comorbidities:   Ms. Tellez  has a past medical history of Anxiety and depression, Arthritis, CAD (coronary artery disease) (9/16/2019), Chest discomfort, Chronic back pain, COPD (chronic obstructive pulmonary disease) ([facility] [address] DM2 (diabetes mellitus, type 2) ([address] (6/21/2012), Frequent UTI, GERD (gastroesophageal reflux disease), H/O seasonal allergies, Heart murmur, Hiatal hernia, History of kidney stones, HLD (hyperlipidemia) (6/21/2012), HTN (hypertension) (6/21/2012), Hypothyroidism, Psychiatric disorder, and Varicose veins (6/21/2012). She also has no past medical history of Arrhythmia, Asthma, Autoimmune disease ([address] Cancer ([address] Chronic kidney disease, Difficult intubation, Heart failure ([address] Liver disease, Malignant hyperthermia due to anesthesia, Nausea & vomiting, Pseudocholinesterase deficiency, PUD (peptic ulcer disease), Seizures ([address] Sleep apnea, Stroke ([address] or Thromboembolus ([address] Ms. Sabra Tuttle  has a past surgical history that includes hx appendectomy (1976); hx cholecystectomy (1997); hx hernia repair (1997); hx tubal ligation (1976); hx hysterectomy (1982); hx gyn (1997); hx urological (1997); hx heart catheterization (2007); hx cyst removal (1973); hx orthopaedic (1985); hx cataract removal (2002 and 2006); hx mohs procedure; endoscopy, colon, diagnostic (2011); and hx breast biopsy. Social History/Living Environment:   Home Environment: Trailer/mobile home  # Steps to Enter: (ramp)  One/Two Story Residence: One story  Living Alone: No  Support Systems: Friends \ neighbors  Patient Expects to be Discharged to[de-identified] Private residence  Current DME Used/Available at Home: Glucometer, Cane, straight(doesnt alwaays use cane)  Tub or Shower Type: Shower  Prior Level of Function/Work/Activity:  Independent at baseline to [employer]  Personal Factors:          Sex:  female        Age:  75 y.o.         Overall Behavior:  agreeable, drowsy    Number of Personal Factors/Comorbidities that affect the Plan of Care:  Age, COPD, DM, CAD 3+: HIGH COMPLEXITY   EXAMINATION:   Most Recent Physical Functioning:   Gross Assessment:                  Posture:     Balance:  Sitting: Intact  Standing: Impaired  Standing - Static: Fair  Standing - Dynamic : Fair Bed Mobility:     Wheelchair Mobility:     Transfers:  Sit to Stand: Stand-by assistance  Stand to Sit: Stand-by assistance  Gait:     Base of Support: Narrowed  Speed/Stephania: Shuffled; Slow  Step Length: Left shortened;Right shortened  Gait Abnormalities: Decreased step clearance;Shuffling gait  Distance (ft): 75 Feet (ft)  Assistive Device: Walker, rolling  Ambulation - Level of Assistance: Contact guard assistance  Interventions: Safety awareness training;Verbal cues      Body Structures Involved:  1. Heart  2. Lungs  3. Muscles Body Functions Affected:  1. Cardio  2. Respiratory  3. Movement Related Activities and Participation Affected:  1. General Tasks and Demands  2. Mobility  3. Self Care   Number of elements that affect the Plan of Care: 4+: HIGH COMPLEXITY   CLINICAL PRESENTATION:   Presentation: Evolving clinical presentation with changing clinical characteristics: MODERATE COMPLEXITY   CLINICAL DECISION MAKIN [facility] Inpatient Short Form  How much difficulty does the patient currently have. .. Unable A Lot A Little None   1. Turning over in bed (including adjusting bedclothes, sheets and blankets)? ? 1   ? 2   ? 3   ? 4   2. Sitting down on and standing up from a chair with arms ( e.g., wheelchair, bedside commode, etc.)   ? 1   ? 2   ? 3   ? 4   3. Moving from lying on back to sitting on the side of the bed?   ? 1   ? 2   ? 3   ? 4   How much help from another person does the patient currently need. .. Total A Lot A Little None   4. Moving to and from a bed to a chair (including a wheelchair)? ? 1   ? 2   ? 3   ? 4   5. Need to walk in hospital room? ? 1   ? 2   ? 3   ? 4   6. Climbing 3-5 steps with a railing? ? 1   ? 2   ? 3   ? 4   © 2007, Trustees of [address] [place] [address] [address] [id number], under license to [employer].  All rights reserved      Score:  Initial: 15 Most Recent: X (Date: -- )    Interpretation of Tool:  Represents activities that are increasingly more difficult (i.e. Bed mobility, Transfers, Gait). Medical Necessity:     · Patient is expected to demonstrate progress in strength, range of motion, balance and coordination  ·  to decrease assistance required with overall functional mobility, transfers, ambulation   · .  · Patient demonstrates good  ·  rehab potential due to higher previous functional level. Reason for Services/Other Comments:  · Patient continues to require present interventions due to patient's inability to perform bed mobility, transfers, ambulation safely and effectively   · . Use of outcome tool(s) and clinical judgement create a POC that gives a: Clear prediction of patient's progress: LOW COMPLEXITY            TREATMENT:   (In addition to Assessment/Re-Assessment sessions the following treatments were rendered)   Pre-treatment Symptoms/Complaints:  \"I guess I am ready\"  Pain: Initial:      Post Session:  does not rate post session, appears comfortable in chair     Therapeutic Activity: (    23 min): Therapeutic activities including Chair , Ambulation on level ground and walker safety, posture, LE ex  to improve mobility, strength, balance and coordination. Required minimal Safety awareness training;Verbal cues to promote static and dynamic balance in standing and promote coordination of bilateral, lower extremity(s).       Date:  9/25/19 Date:   Date:     Activity/Exercise Seated Parameters Parameters Parameters   Heel raises X 15 B     Toe raises X 15 B     LAQ's X 15 B     Hip Flex X 15 B     Hip ABD X 15 B                           Braces/Orthotics/Lines/Etc:   · O2 Device: Nasal cannula  Treatment/Session Assessment:    · Response to Treatment:  tolerated well  · Interdisciplinary Collaboration:   o Physical Therapy Assistant  o Registered Nurse  · After treatment position/precautions:   o Up in chair  o Bed alarm/tab alert on  o Bed/Chair-wheels locked  o Caregiver at bedside  o Call light within reach  o RN notified  o Family at bedside   · Compliance with Program/Exercises: Will assess as treatment progresses  · Recommendations/Intent for next treatment session: \"Next visit will focus on advancements to more challenging activities\".   Total Treatment Duration:  PT Patient Time In/Time Out  Time In: 1454  Time Out: [address], PTA

## 2019-09-25 NOTE — PROGRESS NOTES
This CM met with pt and daughter at bedside this afternoon to discuss discharge planning. Reviewed with pt and daughter that it is the physician recommendation that pt would benefit from additional short term rehab when stable for discharge from the hospital.  Discussed that insurance would need to approve authorization to pt to go to rehab. Pt and daughter agreeable to think about and discuss it with family tonight - this CM provided them with SNF list for choices. No additional discharge needs at this time. This CM to follow up tomorrow for their decision. Will continue to follow.

## 2019-09-25 NOTE — PROGRESS NOTES
Today's Date: 9/25/2019  Date of Admission: 9/16/2019    Chart Reviewed. Subjective:     Patient feels nauseated. She denies any dyspnea. Medications Reviewed. Objective:     Vitals:    09/24/19 1821 09/24/19 2254 09/25/19 0303 09/25/19 0720   BP: 100/48 116/56 123/58 95/55   Pulse: 73 77 80 68   Resp: 18 20 12 14   Temp: 97.8 °F (36.6 °C) 97.2 °F (36.2 °C) 96.6 °F (35.9 °C) 97 °F (36.1 °C)   SpO2:  98% 97% 98%   Weight:   163 lb 1.6 oz (74 kg)    Height:           Intake and Output  Current Shift: 09/25 0701 - 09/25 1900  In: -   Out: 300 [Urine:300]   Last 3 Shifts: 09/23 1901 - 09/25 0700  In: 1267.6 [P.O.:390; I.V.:877.6]  Out: 3384 [Urine:3180]    Physical Exam:  General: Well Developed, Well Nourished, No Acute Distress, Alert & Oriented x 3, Appropriate mood  Neck: supple, no JVD  Heart: S1S2 with RRR without murmurs or gallops  Lungs: decreased at bilateral bases  Abd: soft, nontender, nondistended, with good bowel sounds  Ext: no edema bilaterally  Sternal incision: clean, dry, and intact  Skin: warm and dry    LABS  Data Review:   Recent Labs     09/25/19  0623 09/24/19  0352  09/23/19  1222    141  --  145   K 4.3 4.2   < > 3.5   MG 1.8 2.1   < > 3.1*   BUN 12 11  --  15   CREA 0.45* 0.43*  --  0.73   * 100  --  133*   WBC 11.7* 10.0  --  13.0*   HGB 9.8* 10.3*   < > 11.1*   HCT 30.5* 31.9*   < > 34.1*    162  --  164   INR  --   --   --  1.1    < > = values in this interval not displayed. Estimated Creatinine Clearance: 66.5 mL/min (A) (by C-G formula based on SCr of 0.45 mg/dL (L)).       Assessment/Plan:     Principal Problem:    S/P CABG x 3 (9/25/2019)  No aspirin due to allergy, on BB, ACE-I, statin, pacing wires removed, on O2 by NC, continue PT    Active Problems:    CAD (coronary artery disease) (9/16/2019)  S/p CABG, continue medical therapy       DM2 (diabetes mellitus, type 2) ([facility] [address] (6/21/2012)  Continue current regimen, may need to adjust if appetite remains poor       HLD (hyperlipidemia) (6/21/2012)  Statin       HTN (hypertension) (6/21/2012)  BP low at times       Encounter for weaning from ventilator [facility]) (9/23/2019)        Atelectasis (9/23/2019)  IS      Hypoxia (9/23/2019)  On O2 by PRAMOD Sun PA-C

## 2019-09-25 NOTE — PROGRESS NOTES
Nutrition follow-up:  Reason for initial assessment:   Referral received from nursing admission Malnutrition Screening Tool   Recently Lost Weight Without Trying: Yes  If Yes, How Much Weight Loss: 14 - 23 lbs  Eating Poorly Due to Decreased Appetite: No  Assessment:   Diet: DIET NUTRITIONAL SUPPLEMENTS All Meals; Glucerna Shake  DIET DIABETIC CONSISTENT CARB No options chosen      Food/Nutrition Patient History:   s/p CABG, c/o nausea, poor appetite, \"esophagus bothering her. \"  Met with patient and granddaughter at bedside. She indicates that her she has a history of esophageal spasms and currently feels like she is doing best with liquids and soft items like pudding, she denies any signs of choking just an uncomfortable feeling like she is unable to swallow meats. She drank a glucerna this am and accepted a second serving from me as she feels her nausea is improving. A1C 11.9 today (down from 12.9 on 5/20/19). CDE following. Anthropometrics:Height: 5' 2\" (157.5 cm),  Weight: 68.4 kg (150 lb 12.8 oz), Weight Source: Bed, Body mass index is 27.58 kg/m². BMI class of normal weight. Weight: 74 kg (163 lb 1.6 oz) CVSD bed 9/25  Macronutrient needs:(68.4 kg)  EER:  6386-1139 kcal /day (20-25 kcal/kg listed BW)  EPR:  68-82 grams protein/day (1-1.2 grams/kg listed BW)  Intake/Comparative Standards: Per RD meal rounds: po intake ~25% meals, varied intake of glucerna potentially meeting 25-50% kcal and protein needs.       Nutrition Diagnosis:      Intervention:  Meals and snacks: Continue current diet. Nutrition Supplement Therapy: Continue glucerna shake TID - strawberry flavor. Coordination of Nutrition Care: Discussed with Elian Langston RN. Discharge nutrition plan: Too soon to determine.     [place],  [address] [facility] [address], [address]

## 2019-09-25 NOTE — PROGRESS NOTES
Bedside report received from [facility]. Opportunity for questions, concerns. Patient involved.

## 2019-09-25 NOTE — PROGRESS NOTES
Progress Note    Patient: Abel Mendez MRN: [id number]  SSN: [id number]    YOB: 1945  Age: 75 y.o. Sex: female      Admit Date: 9/16/2019    LOS: 9 days     Subjective:   F/U CABG, DM type II    76 yo PMH of CAD, COPD, DM II, HTN, hypothyroidism, hyperlipidemia S/p CABG on 9/23. Extubated on 9/23. We are consulted for DM management. Denies significant pain. Admits to nausea with little PO intake. Glucose levels controlled.    Current Facility-Administered Medications   Medication Dose Route Frequency    insulin glargine (LANTUS) injection 15 Units  15 Units SubCUTAneous QHS    insulin lispro (HUMALOG) injection 5 Units  5 Units SubCUTAneous TIDAC    insulin lispro (HUMALOG) injection 0-10 Units  0-10 Units SubCUTAneous AC&HS    scopolamine (TRANSDERM-SCOP) 1 mg over 3 days 1 Patch  1 Patch TransDERmal Q72H    ferrous sulfate tablet 325 mg  325 mg Oral ACB    metFORMIN (GLUCOPHAGE) tablet 1,000 mg  1,000 mg Oral BID WITH MEALS    sodium chloride (NS) flush 5-40 mL  5-40 mL IntraVENous Q8H    sodium chloride (NS) flush 5-40 mL  5-40 mL IntraVENous PRN    furosemide (LASIX) tablet 40 mg  40 mg Oral DAILY    alum-mag hydroxide-simeth (MYLANTA) oral suspension 30 mL  30 mL Oral Q4H PRN    famotidine (PEPCID) tablet 20 mg  20 mg Oral Q12H    ondansetron (ZOFRAN) injection 4 mg  4 mg IntraVENous Q6H PRN    acetaminophen (TYLENOL) tablet 650 mg  650 mg Oral Q4H PRN    atorvastatin (LIPITOR) tablet 80 mg  80 mg Oral QHS    amiodarone (CORDARONE) tablet 200 mg  200 mg Oral Q12H    mupirocin (BACTROBAN) 2 % ointment   Both Nostrils BID    magnesium oxide (MAG-OX) tablet 400 mg  400 mg Oral TID PRN    magnesium oxide (MAG-OX) tablet 400 mg  400 mg Oral QID PRN    potassium chloride (KLOR-CON) tablet 10 mEq  10 mEq Oral DAILY    potassium chloride (K-DUR, KLOR-CON) SR tablet 20 mEq  20 mEq Oral BID PRN    potassium chloride (K-DUR, KLOR-CON) SR tablet 40 mEq  40 mEq Oral BID PRN    lisinopril (PRINIVIL, ZESTRIL) tablet 2.5 mg  2.5 mg Oral DAILY    nitroglycerin (NITROSTAT) tablet 0.4 mg  0.4 mg SubLINGual Q5MIN PRN    traMADol (ULTRAM) tablet 50 mg  50 mg Oral Q6H PRN    influenza vaccine 2019-20 (6 mos+)(PF) (FLUARIX/FLULAVAL/FLUZONE QUAD) injection 0.5 mL  0.5 mL IntraMUSCular PRIOR TO DISCHARGE    senna-docusate (PERICOLACE) 8.6-50 mg per tablet 2 Tab  2 Tab Oral BID    oxyCODONE-acetaminophen (PERCOCET) 5-325 mg per tablet 1 Tab  1 Tab Oral Q4H PRN    cyclobenzaprine (FLEXERIL) tablet 10 mg  10 mg Oral TID PRN    [Held by provider] pioglitazone (ACTOS) tablet 30 mg  30 mg Oral DAILY       Objective:     Vitals:    09/24/19 2254 09/25/19 0303 09/25/19 0720 09/25/19 1113   BP: 116/56 123/58 95/55 109/53   Pulse: 77 80 68 67   Resp: 20 12 14 16   Temp: 97.2 °F (36.2 °C) 96.6 °F (35.9 °C) 97 °F (36.1 °C) 97.9 °F (36.6 °C)   SpO2: 98% 97% 98% 97%   Weight:  74 kg (163 lb 1.6 oz)     Height:             Intake and Output:  Current Shift: 09/25 0701 - 09/25 1900  In: 100 [P.O.:100]  Out: 300 [Urine:300]  Last three shifts: 09/23 1901 - 09/25 0700  In: 1267.6 [P.O.:390; I.V.:877.6]  Out: 3384 [Urine:3180]    Physical Exam:   General:  Alert, cooperative, no distress, appears stated age. More talkative today    Eyes:  Conjunctivae/corneas clear. Ears:  Normal TMs and external ear canals both ears. Nose: Nares normal. Septum midline. Mouth/Throat: Lips, mucosa, and tongue normal.    Neck:  no JVD. Back:   deferred. Lungs:   Clear to auscultation bilaterally. Heart:  Regular rate and rhythm, S1, S2 normal   Abdomen:   Soft, non-tender. Bowel sounds normal.    Extremities: Extremities normal, atraumatic, no cyanosis or edema. Pulses: 2+ and symmetric all extremities. Skin: Anterior chest wall with bandage. No obvious signs of bleeding   Lymph nodes: Cervical, supraclavicular, and axillary nodes normal.   Neurologic: CNII-XII intact.         [place] Review:    Recent Results (from the past 24 hour(s))   GLUCOSE, POC    Collection Time: 09/24/19  2:06 PM   Result Value Ref Range    Glucose (POC) 238 (H) 65 - 100 mg/dL   GLUCOSE, POC    Collection Time: 09/24/19  4:53 PM   Result Value Ref Range    Glucose (POC) 183 (H) 65 - 100 mg/dL   GLUCOSE, POC    Collection Time: 09/24/19  8:16 PM   Result Value Ref Range    Glucose (POC) 210 (H) 65 - 100 mg/dL   PLEASE READ & DOCUMENT PPD TEST IN 24 HRS    Collection Time: 09/24/19  9:00 PM   Result Value Ref Range    PPD Negative Negative    mm Induration 0 0 - 5 mm   GLUCOSE, POC    Collection Time: 09/25/19  5:53 AM   Result Value Ref Range    Glucose (POC) 175 (H) 65 - 518 mg/dL   METABOLIC PANEL, BASIC    Collection Time: 09/25/19  6:23 AM   Result Value Ref Range    Sodium 136 136 - 145 mmol/L    Potassium 4.3 3.5 - 5.1 mmol/L    Chloride 102 98 - 107 mmol/L    CO2 29 21 - 32 mmol/L    Anion gap 5 (L) 7 - 16 mmol/L    Glucose 177 (H) 65 - 100 mg/dL    BUN 12 8 - 23 MG/DL    Creatinine 0.45 (L) 0.6 - 1.0 MG/DL    GFR est AA >60 >60 ml/min/1.73m2    GFR est non-AA >60 >60 ml/min/1.73m2    Calcium 8.7 8.3 - 10.4 MG/DL   MAGNESIUM    Collection Time: 09/25/19  6:23 AM   Result Value Ref Range    Magnesium 1.8 1.8 - 2.4 mg/dL   CBC W/O DIFF    Collection Time: 09/25/19  6:23 AM   Result Value Ref Range    WBC 11.7 (H) 4.3 - 11.1 K/uL    RBC 2.91 (L) 4.05 - 5.2 M/uL    HGB 9.8 (L) 11.7 - 15.4 g/dL    HCT 30.5 (L) 35.8 - 46.3 %    .8 (H) 79.6 - 97.8 FL    MCH 33.7 (H) 26.1 - 32.9 PG    MCHC 32.1 31.4 - 35.0 g/dL    RDW 13.0 11.9 - 14.6 %    PLATELET 436 360 - 071 K/uL    MPV 11.4 9.4 - 12.3 FL    ABSOLUTE NRBC 0.00 0.0 - 0.2 K/uL   GLUCOSE, POC    Collection Time: 09/25/19 11:26 AM   Result Value Ref Range    Glucose (POC) 190 (H) 65 - 100 mg/dL       Assessment/ Plan:     Principal Problem:    S/P CABG x 3 (9/25/2019)    Active Problems:    CAD (coronary artery disease) (9/16/2019)      DM2 (diabetes mellitus, type 2) ([facility] [address] (6/21/2012)      Overview: followed by PCP            HLD (hyperlipidemia) (6/21/2012)      Overview: followed by PCP            HTN (hypertension) (6/21/2012)      Overview: readings in target range      Encounter for weaning from ventilator [facility]) (9/23/2019)      Atelectasis (9/23/2019)      Hypoxia (9/23/2019)    DM type II - Add back Lantus to 15 units. SS. Humalog 5 units before meals. Actos has been held. Metformin started by cardiology     CAD s/p CABG - Cardiology as primary. Amiodarone, amlodipine, statin, ACE, BB. Lasix 40mg daily for the next 3 days. Pepcid BID. Scopolamine every 72 hours.      DVT prophylaxis - SCD  Signed By: Keisha Marie DO     September 25, 2019

## 2019-09-26 LAB
ABO + RH BLD: NORMAL
BLD PROD TYP BPU: NORMAL
BLOOD GROUP ANTIBODIES SERPL: NORMAL
BPU ID: NORMAL
CROSSMATCH RESULT,%XM: NORMAL
GLUCOSE BLD STRIP.AUTO-MCNC: 107 MG/DL (ref 65–100)
GLUCOSE BLD STRIP.AUTO-MCNC: 134 MG/DL (ref 65–100)
GLUCOSE BLD STRIP.AUTO-MCNC: 210 MG/DL (ref 65–100)
GLUCOSE BLD STRIP.AUTO-MCNC: 242 MG/DL (ref 65–100)
MAGNESIUM SERPL-MCNC: 1.7 MG/DL (ref 1.8–2.4)
MM INDURATION POC: 0 MM (ref 0–5)
POTASSIUM SERPL-SCNC: 4 MMOL/L (ref 3.5–5.1)
PPD POC: NEGATIVE NEGATIVE
SPECIMEN EXP DATE BLD: NORMAL
STATUS OF UNIT,%ST: NORMAL
UNIT DIVISION, %UDIV: 0

## 2019-09-26 PROCEDURE — 83735 ASSAY OF MAGNESIUM: CPT

## 2019-09-26 PROCEDURE — 65660000004 HC RM CVT STEPDOWN

## 2019-09-26 PROCEDURE — 36415 COLL VENOUS BLD VENIPUNCTURE: CPT

## 2019-09-26 PROCEDURE — 82962 GLUCOSE BLOOD TEST: CPT

## 2019-09-26 PROCEDURE — 74011250637 HC RX REV CODE- 250/637: Performed by: THORACIC SURGERY (CARDIOTHORACIC VASCULAR SURGERY)

## 2019-09-26 PROCEDURE — 97166 OT EVAL MOD COMPLEX 45 MIN: CPT

## 2019-09-26 PROCEDURE — 74011250636 HC RX REV CODE- 250/636: Performed by: THORACIC SURGERY (CARDIOTHORACIC VASCULAR SURGERY)

## 2019-09-26 PROCEDURE — 77030012890

## 2019-09-26 PROCEDURE — 99232 SBSQ HOSP IP/OBS MODERATE 35: CPT | Performed by: INTERNAL MEDICINE

## 2019-09-26 PROCEDURE — 97530 THERAPEUTIC ACTIVITIES: CPT

## 2019-09-26 PROCEDURE — 97535 SELF CARE MNGMENT TRAINING: CPT

## 2019-09-26 PROCEDURE — 84132 ASSAY OF SERUM POTASSIUM: CPT

## 2019-09-26 PROCEDURE — 74011636637 HC RX REV CODE- 636/637: Performed by: FAMILY MEDICINE

## 2019-09-26 RX ORDER — CLOPIDOGREL BISULFATE 75 MG/1
75 TABLET ORAL DAILY
Status: DISCONTINUED | OUTPATIENT
Start: 2019-09-26 | End: 2019-09-27 | Stop reason: HOSPADM

## 2019-09-26 RX ORDER — INSULIN GLARGINE 100 [IU]/ML
20 INJECTION, SOLUTION SUBCUTANEOUS
Status: DISCONTINUED | OUTPATIENT
Start: 2019-09-26 | End: 2019-09-27 | Stop reason: HOSPADM

## 2019-09-26 RX ORDER — INSULIN LISPRO 100 [IU]/ML
7 INJECTION, SOLUTION INTRAVENOUS; SUBCUTANEOUS
Status: DISCONTINUED | OUTPATIENT
Start: 2019-09-26 | End: 2019-09-27 | Stop reason: HOSPADM

## 2019-09-26 RX ADMIN — MUPIROCIN: 20 OINTMENT TOPICAL at 22:03

## 2019-09-26 RX ADMIN — FAMOTIDINE 20 MG: 20 TABLET ORAL at 22:01

## 2019-09-26 RX ADMIN — ONDANSETRON 4 MG: 2 INJECTION INTRAMUSCULAR; INTRAVENOUS at 03:37

## 2019-09-26 RX ADMIN — INSULIN LISPRO 4 UNITS: 100 INJECTION, SOLUTION INTRAVENOUS; SUBCUTANEOUS at 09:29

## 2019-09-26 RX ADMIN — ATORVASTATIN CALCIUM 80 MG: 40 TABLET, FILM COATED ORAL at 22:00

## 2019-09-26 RX ADMIN — FAMOTIDINE 20 MG: 20 TABLET ORAL at 09:27

## 2019-09-26 RX ADMIN — MUPIROCIN: 20 OINTMENT TOPICAL at 09:33

## 2019-09-26 RX ADMIN — INSULIN LISPRO 4 UNITS: 100 INJECTION, SOLUTION INTRAVENOUS; SUBCUTANEOUS at 11:46

## 2019-09-26 RX ADMIN — POTASSIUM CHLORIDE 20 MEQ: 20 TABLET, EXTENDED RELEASE ORAL at 17:50

## 2019-09-26 RX ADMIN — SENNOSIDES, DOCUSATE SODIUM 2 TABLET: 50; 8.6 TABLET, FILM COATED ORAL at 09:27

## 2019-09-26 RX ADMIN — ACETAMINOPHEN 650 MG: 325 TABLET, FILM COATED ORAL at 17:48

## 2019-09-26 RX ADMIN — ACETAMINOPHEN 650 MG: 325 TABLET, FILM COATED ORAL at 09:27

## 2019-09-26 RX ADMIN — ACETAMINOPHEN 650 MG: 325 TABLET, FILM COATED ORAL at 22:00

## 2019-09-26 RX ADMIN — INSULIN GLARGINE 20 UNITS: 100 INJECTION, SOLUTION SUBCUTANEOUS at 22:01

## 2019-09-26 RX ADMIN — TRAMADOL HYDROCHLORIDE 50 MG: 50 TABLET, FILM COATED ORAL at 03:37

## 2019-09-26 RX ADMIN — SENNOSIDES, DOCUSATE SODIUM 2 TABLET: 50; 8.6 TABLET, FILM COATED ORAL at 22:00

## 2019-09-26 RX ADMIN — POTASSIUM CHLORIDE 20 MEQ: 20 TABLET, EXTENDED RELEASE ORAL at 06:22

## 2019-09-26 RX ADMIN — Medication 10 ML: at 22:07

## 2019-09-26 RX ADMIN — INSULIN LISPRO 7 UNITS: 100 INJECTION, SOLUTION INTRAVENOUS; SUBCUTANEOUS at 11:47

## 2019-09-26 RX ADMIN — TRAMADOL HYDROCHLORIDE 50 MG: 50 TABLET, FILM COATED ORAL at 22:44

## 2019-09-26 RX ADMIN — Medication 10 ML: at 06:24

## 2019-09-26 RX ADMIN — AMIODARONE HYDROCHLORIDE 200 MG: 200 TABLET ORAL at 09:27

## 2019-09-26 RX ADMIN — INSULIN LISPRO 5 UNITS: 100 INJECTION, SOLUTION INTRAVENOUS; SUBCUTANEOUS at 09:30

## 2019-09-26 RX ADMIN — ONDANSETRON 4 MG: 2 INJECTION INTRAMUSCULAR; INTRAVENOUS at 11:51

## 2019-09-26 RX ADMIN — INSULIN LISPRO 7 UNITS: 100 INJECTION, SOLUTION INTRAVENOUS; SUBCUTANEOUS at 17:48

## 2019-09-26 RX ADMIN — Medication 10 ML: at 11:52

## 2019-09-26 RX ADMIN — FERROUS SULFATE TAB 325 MG (65 MG ELEMENTAL FE) 325 MG: 325 (65 FE) TAB at 06:21

## 2019-09-26 NOTE — PROGRESS NOTES
Progress Note    Patient: Saman Kelly MRN: [id number]  SSN: [id number]    YOB: 1945  Age: 75 y.o. Sex: female      Admit Date: 9/16/2019    LOS: 10 days     Subjective:   F/U CABG, DM type II    74 yo PMH of CAD, COPD, DM II, HTN, hypothyroidism, hyperlipidemia S/p CABG on 9/23. Extubated on 9/23. We are consulted for DM management. Denies significant pain. Admits to nausea with little PO intake. Admits to constipation   Glucose levels elevated.    Current Facility-Administered Medications   Medication Dose Route Frequency    insulin glargine (LANTUS) injection 20 Units  20 Units SubCUTAneous QHS    insulin lispro (HUMALOG) injection 7 Units  7 Units SubCUTAneous TIDAC    clopidogrel (PLAVIX) tablet 75 mg  75 mg Oral DAILY    insulin lispro (HUMALOG) injection 0-10 Units  0-10 Units SubCUTAneous AC&HS    scopolamine (TRANSDERM-SCOP) 1 mg over 3 days 1 Patch  1 Patch TransDERmal Q72H    ferrous sulfate tablet 325 mg  325 mg Oral ACB    metFORMIN (GLUCOPHAGE) tablet 1,000 mg  1,000 mg Oral BID WITH MEALS    sodium chloride (NS) flush 5-40 mL  5-40 mL IntraVENous Q8H    sodium chloride (NS) flush 5-40 mL  5-40 mL IntraVENous PRN    furosemide (LASIX) tablet 40 mg  40 mg Oral DAILY    alum-mag hydroxide-simeth (MYLANTA) oral suspension 30 mL  30 mL Oral Q4H PRN    famotidine (PEPCID) tablet 20 mg  20 mg Oral Q12H    ondansetron (ZOFRAN) injection 4 mg  4 mg IntraVENous Q6H PRN    acetaminophen (TYLENOL) tablet 650 mg  650 mg Oral Q4H PRN    atorvastatin (LIPITOR) tablet 80 mg  80 mg Oral QHS    mupirocin (BACTROBAN) 2 % ointment   Both Nostrils BID    magnesium oxide (MAG-OX) tablet 400 mg  400 mg Oral TID PRN    magnesium oxide (MAG-OX) tablet 400 mg  400 mg Oral QID PRN    potassium chloride (KLOR-CON) tablet 10 mEq  10 mEq Oral DAILY    potassium chloride (K-DUR, KLOR-CON) SR tablet 20 mEq  20 mEq Oral BID PRN    potassium chloride (K-DUR, KLOR-CON) SR tablet 40 mEq  40 mEq Oral BID PRN    lisinopril (PRINIVIL, ZESTRIL) tablet 2.5 mg  2.5 mg Oral DAILY    nitroglycerin (NITROSTAT) tablet 0.4 mg  0.4 mg SubLINGual Q5MIN PRN    traMADol (ULTRAM) tablet 50 mg  50 mg Oral Q6H PRN    influenza vaccine 2019-20 (6 mos+)(PF) (FLUARIX/FLULAVAL/FLUZONE QUAD) injection 0.5 mL  0.5 mL IntraMUSCular PRIOR TO DISCHARGE    senna-docusate (PERICOLACE) 8.6-50 mg per tablet 2 Tab  2 Tab Oral BID    oxyCODONE-acetaminophen (PERCOCET) 5-325 mg per tablet 1 Tab  1 Tab Oral Q4H PRN    cyclobenzaprine (FLEXERIL) tablet 10 mg  10 mg Oral TID PRN    [Held by provider] pioglitazone (ACTOS) tablet 30 mg  30 mg Oral DAILY       Objective:     Vitals:    09/26/19 0333 09/26/19 0734 09/26/19 0807 09/26/19 1124   BP: 114/58 109/53  111/53   Pulse: 78 72  66   Resp: 18 18  18   Temp: 98.3 °F (36.8 °C) 98 °F (36.7 °C)  98.4 °F (36.9 °C)   SpO2: 96% 91% 94% 96%   Weight: 75.3 kg (166 lb)      Height:             Intake and Output:  Current Shift: 09/26 0701 - 09/26 1900  In: 240 [P.O.:240]  Out: 700 [Urine:700]  Last three shifts: 09/24 1901 - 09/26 0700  In: 56 [P.O.:610]  Out: 2450 [Urine:2450]    Physical Exam:   General:  Alert, cooperative, no distress, appears stated age. Eyes:  Conjunctivae/corneas clear. Ears:  Normal TMs and external ear canals both ears. Nose: Nares normal. Septum midline. Mouth/Throat: Lips, mucosa, and tongue normal.    Neck:  no JVD. Back:   deferred. Lungs:   Clear to auscultation bilaterally. Heart:  Regular rate and rhythm, S1, S2 normal   Abdomen:   Soft, non-tender. Bowel sounds normal.    Extremities: Extremities normal, atraumatic, no cyanosis or edema. Pulses: 2+ and symmetric all extremities. Skin: Anterior chest wall with bandage. No obvious signs of bleeding   Lymph nodes: Cervical, supraclavicular, and axillary nodes normal.   Neurologic: CNII-XII intact.         Lab/Data Review:    Recent Results (from the past 24 hour(s))   GLUCOSE, POC    Collection Time: 09/25/19  3:45 PM   Result Value Ref Range    Glucose (POC) 178 (H) 65 - 100 mg/dL   PLEASE READ & DOCUMENT PPD TEST IN 48 HRS    Collection Time: 09/25/19  9:15 PM   Result Value Ref Range    PPD Negative Negative    mm Induration 0 0 - 5 mm   GLUCOSE, POC    Collection Time: 09/25/19  9:58 PM   Result Value Ref Range    Glucose (POC) 227 (H) 65 - 100 mg/dL   MAGNESIUM    Collection Time: 09/26/19  3:46 AM   Result Value Ref Range    Magnesium 1.7 (L) 1.8 - 2.4 mg/dL   POTASSIUM    Collection Time: 09/26/19  3:46 AM   Result Value Ref Range    Potassium 4.0 3.5 - 5.1 mmol/L   GLUCOSE, POC    Collection Time: 09/26/19  6:21 AM   Result Value Ref Range    Glucose (POC) 210 (H) 65 - 100 mg/dL   GLUCOSE, POC    Collection Time: 09/26/19 11:26 AM   Result Value Ref Range    Glucose (POC) 242 (H) 65 - 100 mg/dL       Assessment/ Plan:     Principal Problem:    S/P CABG x 3 (9/25/2019)    Active Problems:    CAD (coronary artery disease) (9/16/2019)      DM2 (diabetes mellitus, type 2) ([facility] [address] (6/21/2012)      Overview: followed by PCP            HLD (hyperlipidemia) (6/21/2012)      Overview: followed by PCP            HTN (hypertension) (6/21/2012)      Overview: readings in target range      Encounter for weaning from ventilator ([facility] [address] (9/23/2019)      Atelectasis (9/23/2019)      Hypoxia (9/23/2019)    DM type II - Add back Lantus to 20 units. SS. Humalog 7 units before meals. Actos has been held. Metformin started by cardiology     CAD s/p CABG - Cardiology as primary. Amiodarone, amlodipine, statin, ACE, BB, and Plavix. Lasix 40mg daily for the next 3 days. Pepcid BID. Scopolamine every 72 hours.      Has senna docusate two tab BID for constipation    DVT prophylaxis - SCD  Signed By: Ana Klein DO     September 26, 2019

## 2019-09-26 NOTE — DIABETES MGMT
Patient's blood glucose ranged 175-227 yesterday with patient receiving Lantus 15 units, Humalog 25 units, and metformin 1000 mg. Blood glucose 210 this morning. Spoke with provider and order received to increase Lantus to 20 units QHS to improve fasting glucose and increase prandial insulin to 7 units with meals. Attempted to review with patient, but patient working with therapy.

## 2019-09-26 NOTE — PROGRESS NOTES
Cardiac Rehab: Spoke with patient and daughter regarding referral to cardiac rehab. Patient meets admission criteria based on CABG x3 (09/23/19). Written information about Cardiac Rehab given and reviewed with patient. Discussed lifestyle modifications to promote cardiac wellness. Patient indicated that she wants to participate in the cardiac rehab [facility] at [facility] which is closer to her home in [place]. She and her family are aware that the [facility] program has a waiting list which might delay her starting Cardiac Rehab. Pt and family given contact information to the [address] [place] [address] program. We will forward her referral for Cardiac Rehab to the [facility] program. Her Cardiologist is Dr. Meg Anton.       Thank you,  SHAY Giordano, RN  Cardiopulmonary Rehabilitation Nurse Liaison  [facility]

## 2019-09-26 NOTE — PROGRESS NOTES
Today's Date: 9/26/2019  Date of Admission: 9/16/2019    Chart Reviewed. Subjective:     Pt feels better with less nausea. She states she still gets nauseated when she is up moving around. Medications Reviewed. Objective:     Vitals:    09/25/19 2159 09/25/19 2257 09/26/19 0333 09/26/19 0734   BP: 109/53 106/51 114/58 109/53   Pulse: 83 84 78 72   Resp:  20 18 18   Temp:  98.1 °F (36.7 °C) 98.3 °F (36.8 °C) 98 °F (36.7 °C)   SpO2:  94% 96% 91%   Weight:   166 lb (75.3 kg)    Height:           Intake and Output  Current Shift: No intake/output data recorded. Last 3 Shifts: 09/24 1901 - 09/26 0700  In: 56 [P.O.:610]  Out: 2450 [Urine:2450]    Physical Exam:  General: Well Developed, Well Nourished, No Acute Distress, Alert & Oriented x 3, Appropriate mood  Neck: supple, no JVD  Heart: S1S2 with RRR without murmurs or gallops  Lungs: decreased bilaterally  Abd: soft, nontender, nondistended, with good bowel sounds  Ext: no edema bilaterally  Sternal incision: clean, dry, and intact  Skin: warm and dry    LABS  Data Review:   Recent Labs     09/26/19  0346 09/25/19  0623 09/24/19  0352  09/23/19  1222   NA  --  136 141  --  145   K 4.0 4.3 4.2   < > 3.5   MG 1.7* 1.8 2.1   < > 3.1*   BUN  --  12 11  --  15   CREA  --  0.45* 0.43*  --  0.73   GLU  --  177* 100  --  133*   WBC  --  11.7* 10.0  --  13.0*   HGB  --  9.8* 10.3*   < > 11.1*   HCT  --  30.5* 31.9*   < > 34.1*   PLT  --  166 162  --  164   INR  --   --   --   --  1.1    < > = values in this interval not displayed. Estimated Creatinine Clearance: 67 mL/min (A) (by C-G formula based on SCr of 0.45 mg/dL (L)).       Assessment/Plan:     Principal Problem:    S/P CABG x 3 (9/25/2019)  No aspirin due to allergy, on BB, ACE-I, statin, pacing wires removed, stable on room air, continue PT     Active Problems:    CAD (coronary artery disease) (9/16/2019)  S/p CABG, continue medical therapy        DM2 (diabetes mellitus, type 2) ([facility] (6/21/2012)  Continue current regimen       HLD (hyperlipidemia) (6/21/2012)  Statin        HTN (hypertension) (6/21/2012)  BP low at times        Encounter for weaning from ventilator [facility]) (9/23/2019)          Atelectasis (9/23/2019)  IS encouraged, pulling less than 500       Hypoxia (9/23/2019)  Resolved, stable on room air but doing poorly with IS    Dispo  Will need STR       Beni Marie PA-C

## 2019-09-26 NOTE — PROGRESS NOTES
Bedside shift change report given to Julissa (oncoming nurse) by Joel perez (offgoing nurse). Report included the following information SBAR, Kardex, Intake/Output, MAR, Recent Results and Cardiac Rhythm NSR.

## 2019-09-26 NOTE — PROGRESS NOTES
This CM met with pt, daughter, and other family members this day to discuss discharge planning. Pt would like to go to [facility] in [place], [place]. After we discussed options pt would like referral to be sent to [facility] rehab - referral sent via [facility]. [facility] accepted pt. This CM sent referral to [facility] for prior authorization request.  No additional discharge needs voiced at this time. CM to continue to follow.

## 2019-09-26 NOTE — PROGRESS NOTES
Problem: Mobility Impaired (Adult and Pediatric)  Goal: *Acute Goals and Plan of Care (Insert Text)  Description  LTG:  (1.)Ms. Mark Arango will move from supine to sit and sit to supine , scoot up and down and roll side to side with INDEPENDENT within 7 treatment day(s) from flat surface without handrail. (2.)Ms. Mark Arango will transfer from bed to chair and chair to bed with INDEPENDENT using the least restrictive device within 7 treatment day(s). (3.)Ms. Mark Arango will ambulate with MODIFIED INDEPENDENCE for 500+ feet with the least restrictive device within 7 treatment day(s) while maintaining normal vital signs. _____________________________________________________________________________________________   Outcome: Progressing Towards Goal     PHYSICAL THERAPY: Daily Note and PM 9/26/2019  INPATIENT: PT Visit Days : 3  Payor: Sedrick Shah / Plan: [address] [place] [address] HMO / Product Type: [employer] /       NAME/AGE/GENDER: Missael Gonzalez is a 75 y.o. female   PRIMARY DIAGNOSIS: Abnormal nuclear stress test [R94.39]  CAD (coronary artery disease) [I25.10] S/P CABG x 3   S/P CABG x 3    Procedure(s) (LRB):  CORONARY ARTERY BYPASS GRAFT (CABG X 3)/ LIMA (N/A)  VEIN HARVEST/ GREATER SAPHENOUS (Left)  ESOPHAGEAL TRANS ECHOCARDIOGRAM (N/A)  3 Days Post-Op  ICD-10: Treatment Diagnosis:    · Difficulty in walking, Not elsewhere classified (R26.2)   Precaution/Allergies:  Aspirin; Codeine; Keflex [cephalexin]; Pcn [penicillins]; and Sulfa dyne      ASSESSMENT:     Ms. Mark Arango presents with decreased bed mobility, transfers, ambulation, balance, activity tolerance, strength and overall general functional mobility s/p hospital admission with CABG x 3 on 9/23/19. Pt states independent at baseline, uses cane at times on unlevel surfaces but mostly independent with ambulation. Pt drives, reports some falls at times but none in past 6 months. Pt lives with family. Pt sitting up in chair on contact.   CGA for sit to stand. She ambulated with RW for 100' very slowly with flexed posture. Pt required cues for walker safety and posture. Ambulates extremely slowly. Pt returned to room and sat in chair. She performed bilateral LE ex as listed below. She then stood again and turned to sit EOB with Min HHA. EOB to supine with min assist to LE's. Left with needs in reach and family in room. This section established at most recent assessment   PROBLEM LIST (Impairments causing functional limitations):  1. Decreased ADL/Functional Activities  2. Decreased Transfer Abilities  3. Decreased Ambulation Ability/Technique  4. Decreased Balance  5. Increased Pain  6. Decreased Activity Tolerance  7. Increased Fatigue  8. Decreased [place] with Home Exercise Program   INTERVENTIONS PLANNED: (Benefits and precautions of physical therapy have been discussed with the patient.)  1. Balance Exercise  2. Bed Mobility  3. Family Education  4. Gait Training  5. Home Exercise Program (HEP)  6. Therapeutic Activites  7. Therapeutic Exercise/Strengthening  8. Transfer Training     TREATMENT PLAN: Frequency/Duration: twice daily for duration of hospital stay  Rehabilitation Potential For Stated Goals: Good     REHAB RECOMMENDATIONS (at time of discharge pending progress):    Placement: It is my opinion, based on this patient's performance to date, that Ms. Lawyer Pham may benefit from participating in 1-2 additional therapy sessions in order to continue to assess for rehab potential and then make recommendation for disposition at discharge.   Equipment:    None at this time              HISTORY:   History of Present Injury/Illness (Reason for Referral):  S/p CABG x 3, generalized weakness  Past Medical History/Comorbidities:   Ms. Lawyer Pham  has a past medical history of Anxiety and depression, Arthritis, CAD (coronary artery disease) (9/16/2019), Chest discomfort, Chronic back pain, COPD (chronic obstructive pulmonary disease) ([facility] DM2 (diabetes mellitus, type 2) ([address] (6/21/2012), Frequent UTI, GERD (gastroesophageal reflux disease), H/O seasonal allergies, Heart murmur, Hiatal hernia, History of kidney stones, HLD (hyperlipidemia) (6/21/2012), HTN (hypertension) (6/21/2012), Hypothyroidism, Psychiatric disorder, and Varicose veins (6/21/2012). She also has no past medical history of Arrhythmia, Asthma, Autoimmune disease ([address] Cancer ([address] Chronic kidney disease, Difficult intubation, Heart failure ([address] Liver disease, Malignant hyperthermia due to anesthesia, Nausea & vomiting, Pseudocholinesterase deficiency, PUD (peptic ulcer disease), Seizures ([address] Sleep apnea, Stroke ([address] or Thromboembolus ([address] Ms. Vilma Jefferson  has a past surgical history that includes hx appendectomy (1976); hx cholecystectomy (1997); hx hernia repair (1997); hx tubal ligation (1976); hx hysterectomy (1982); hx gyn (1997); hx urological (1997); hx heart catheterization (2007); hx cyst removal (1973); hx orthopaedic (1985); hx cataract removal (2002 and 2006); hx mohs procedure; endoscopy, colon, diagnostic (2011); and hx breast biopsy. Social History/Living Environment:   Home Environment: Trailer/mobile home  # Steps to Enter: 0  Wheelchair Ramp: Yes  One/Two Story Residence: One story  Living Alone: No  Support Systems: Child(jose)  Patient Expects to be Discharged to[de-identified] Private residence  Current DME Used/Available at Home: Benjamen [place], straight, Walker, rollator, Walker, rolling, Raised toilet seat  Tub or Shower Type: Shower  Prior Level of Function/Work/Activity:  Independent at baseline to [employer]  Personal Factors:          Sex:  female        Age:  75 y.o.         Overall Behavior:  agreeable, drowsy    Number of Personal Factors/Comorbidities that affect the Plan of Care:  Age, COPD, DM, CAD 3+: HIGH COMPLEXITY   EXAMINATION:   Most Recent Physical Functioning:   Gross Assessment:                  Posture:     Balance:  Sitting: Intact  Standing: Impaired  Standing - Static: Fair  Standing - Dynamic : Fair Bed Mobility:     Wheelchair Mobility:     Transfers:  Sit to Stand: Contact guard assistance  Stand to Sit: Contact guard assistance  Gait:     Base of Support: Narrowed  Speed/Stephania: Shuffled; Slow  Step Length: Left shortened;Right shortened  Gait Abnormalities: Decreased step clearance;Shuffling gait  Distance (ft): 100 Feet (ft)  Assistive Device: Walker, rolling  Ambulation - Level of Assistance: Stand-by assistance;Contact guard assistance  Interventions: Safety awareness training;Verbal cues      Body Structures Involved:  1. Heart  2. Lungs  3. Muscles Body Functions Affected:  1. Cardio  2. Respiratory  3. Movement Related Activities and Participation Affected:  1. General Tasks and Demands  2. Mobility  3. Self Care   Number of elements that affect the Plan of Care: 4+: HIGH COMPLEXITY   CLINICAL PRESENTATION:   Presentation: Evolving clinical presentation with changing clinical characteristics: MODERATE COMPLEXITY   CLINICAL DECISION MAKIN [facility] Inpatient Short Form  How much difficulty does the patient currently have. .. Unable A Lot A Little None   1. Turning over in bed (including adjusting bedclothes, sheets and blankets)? ? 1   ? 2   ? 3   ? 4   2. Sitting down on and standing up from a chair with arms ( e.g., wheelchair, bedside commode, etc.)   ? 1   ? 2   ? 3   ? 4   3. Moving from lying on back to sitting on the side of the bed?   ? 1   ? 2   ? 3   ? 4   How much help from another person does the patient currently need. .. Total A Lot A Little None   4. Moving to and from a bed to a chair (including a wheelchair)? ? 1   ? 2   ? 3   ? 4   5. Need to walk in hospital room? ? 1   ? 2   ? 3   ? 4   6. Climbing 3-5 steps with a railing? ? 1   ? 2   ? 3   ? 4   © , Trustees of [address] [place] [address] [address] [address], under license to [employer].  All rights reserved      Score:  Initial: 15 Most Recent: X (Date: -- ) Interpretation of Tool:  Represents activities that are increasingly more difficult (i.e. Bed mobility, Transfers, Gait). Medical Necessity:     · Patient is expected to demonstrate progress in strength, range of motion, balance and coordination  ·  to decrease assistance required with overall functional mobility, transfers, ambulation   · .  · Patient demonstrates good  ·  rehab potential due to higher previous functional level. Reason for Services/Other Comments:  · Patient continues to require present interventions due to patient's inability to perform bed mobility, transfers, ambulation safely and effectively   · . Use of outcome tool(s) and clinical judgement create a POC that gives a: Clear prediction of patient's progress: LOW COMPLEXITY            TREATMENT:   (In addition to Assessment/Re-Assessment sessions the following treatments were rendered)   Pre-treatment Symptoms/Complaints:  \"I've already walked today. .. I walked to the bathroom. \"  Pain: Initial: 0     Post Session:   appears comfortable in chair     Therapeutic Activity: (    25 min): Therapeutic activities including Chair , Ambulation on level ground and walker safety, posture, LE ex  to improve mobility, strength, balance and coordination. Required minimal Safety awareness training;Verbal cues to promote static and dynamic balance in standing and promote coordination of bilateral, lower extremity(s).       Date:  9/25/19 Date:  9/26/19 BID Date:     Activity/Exercise Seated Parameters Parameters Parameters   Heel raises X 15 B X 20 B    Toe raises X 15 B X 20 B    LAQ's X 15 B X 10 B    Hip Flex X 15 B X 10 B    Hip ABD X 15 B X 10 B                          Braces/Orthotics/Lines/Etc:   · room air  Treatment/Session Assessment:    · Response to Treatment:  tolerated well  · Interdisciplinary Collaboration:   o Physical Therapy Assistant  o Registered Nurse  · After treatment position/precautions:   o Supine in bed  o Bed alarm/tab alert on  o Bed/Chair-wheels locked  o Caregiver at bedside  o Call light within reach  o RN notified  o Family at bedside   · Compliance with Program/Exercises: Will assess as treatment progresses  · Recommendations/Intent for next treatment session: \"Next visit will focus on advancements to more challenging activities\".   Total Treatment Duration:  PT Patient Time In/Time Out  Time In: 1339  Time Out: Nathaly Hanna PTA

## 2019-09-26 NOTE — PROGRESS NOTES
Problem: Falls - Risk of  Goal: *Absence of Falls  Description  Document Loreli Hunger Fall Risk and appropriate interventions in the flowsheet. Outcome: Progressing Towards Goal  Note:   Fall Risk Interventions:  Mobility Interventions: Bed/chair exit alarm, Communicate number of staff needed for ambulation/transfer, Patient to call before getting OOB, PT Consult for mobility concerns, Strengthening exercises (ROM-active/passive)    Mentation Interventions: Adequate sleep, hydration, pain control    Medication Interventions: Patient to call before getting OOB, Teach patient to arise slowly, Bed/chair exit alarm    Elimination Interventions: Bed/chair exit alarm, Call light in reach, Patient to call for help with toileting needs, Stay With Me (per policy)    History of Falls Interventions: Bed/chair exit alarm         Problem: Pressure Injury - Risk of  Goal: *Prevention of pressure injury  Description  Document Tobi Scale and appropriate interventions in the flowsheet.   Outcome: Progressing Towards Goal  Note:   Pressure Injury Interventions:  Sensory Interventions: Maintain/enhance activity level, Pressure redistribution bed/mattress (bed type), Assess changes in LOC         Activity Interventions: Increase time out of bed, Pressure redistribution bed/mattress(bed type), PT/OT evaluation    Mobility Interventions: HOB 30 degrees or less, Pressure redistribution bed/mattress (bed type), PT/OT evaluation    Nutrition Interventions: Document food/fluid/supplement intake, Offer support with meals,snacks and hydration    Friction and Shear Interventions: Apply protective barrier, creams and emollients, HOB 30 degrees or less, Foam dressings/transparent film/skin sealants

## 2019-09-26 NOTE — PROGRESS NOTES
Critical Care Daily Progress Note: 9/26/2019    Adrianna Joseph   Admission Date: 9/16/2019         The patient's chart is reviewed and the patient is discussed with the staff. 75 y old WF, s/p CABG x 3       Subjective:    On RA denies dyspnea    Current Facility-Administered Medications   Medication Dose Route Frequency    insulin glargine (LANTUS) injection 20 Units  20 Units SubCUTAneous QHS    insulin lispro (HUMALOG) injection 7 Units  7 Units SubCUTAneous TIDAC    clopidogrel (PLAVIX) tablet 75 mg  75 mg Oral DAILY    insulin lispro (HUMALOG) injection 0-10 Units  0-10 Units SubCUTAneous AC&HS    scopolamine (TRANSDERM-SCOP) 1 mg over 3 days 1 Patch  1 Patch TransDERmal Q72H    ferrous sulfate tablet 325 mg  325 mg Oral ACB    metFORMIN (GLUCOPHAGE) tablet 1,000 mg  1,000 mg Oral BID WITH MEALS    sodium chloride (NS) flush 5-40 mL  5-40 mL IntraVENous Q8H    sodium chloride (NS) flush 5-40 mL  5-40 mL IntraVENous PRN    furosemide (LASIX) tablet 40 mg  40 mg Oral DAILY    alum-mag hydroxide-simeth (MYLANTA) oral suspension 30 mL  30 mL Oral Q4H PRN    famotidine (PEPCID) tablet 20 mg  20 mg Oral Q12H    ondansetron (ZOFRAN) injection 4 mg  4 mg IntraVENous Q6H PRN    acetaminophen (TYLENOL) tablet 650 mg  650 mg Oral Q4H PRN    atorvastatin (LIPITOR) tablet 80 mg  80 mg Oral QHS    mupirocin (BACTROBAN) 2 % ointment   Both Nostrils BID    magnesium oxide (MAG-OX) tablet 400 mg  400 mg Oral TID PRN    magnesium oxide (MAG-OX) tablet 400 mg  400 mg Oral QID PRN    potassium chloride (KLOR-CON) tablet 10 mEq  10 mEq Oral DAILY    potassium chloride (K-DUR, KLOR-CON) SR tablet 20 mEq  20 mEq Oral BID PRN    potassium chloride (K-DUR, KLOR-CON) SR tablet 40 mEq  40 mEq Oral BID PRN    lisinopril (PRINIVIL, ZESTRIL) tablet 2.5 mg  2.5 mg Oral DAILY    nitroglycerin (NITROSTAT) tablet 0.4 mg  0.4 mg SubLINGual Q5MIN PRN    traMADol (ULTRAM) tablet 50 mg  50 mg Oral Q6H PRN    influenza vaccine 2019-20 (6 mos+)(PF) (FLUARIX/FLULAVAL/FLUZONE QUAD) injection 0.5 mL  0.5 mL IntraMUSCular PRIOR TO DISCHARGE    senna-docusate (PERICOLACE) 8.6-50 mg per tablet 2 Tab  2 Tab Oral BID    oxyCODONE-acetaminophen (PERCOCET) 5-325 mg per tablet 1 Tab  1 Tab Oral Q4H PRN    cyclobenzaprine (FLEXERIL) tablet 10 mg  10 mg Oral TID PRN    [Held by provider] pioglitazone (ACTOS) tablet 30 mg  30 mg Oral DAILY     Review of Systems  Constitutional:  negative for fever, chills, sweats  Cardiovascular:  negative for chest pain, palpitations, syncope, edema  Gastrointestinal:  negative for dysphagia, reflux, vomiting, diarrhea, abdominal pain, or melena  Neurologic:  negative for focal weakness, numbness, headache    Objective:     Vitals:    09/26/19 0333 09/26/19 0734 09/26/19 0807 09/26/19 1124   BP: 114/58 109/53  111/53   Pulse: 78 72  66   Resp: 18 18  18   Temp: 98.3 °F (36.8 °C) 98 °F (36.7 °C)  98.4 °F (36.9 °C)   SpO2: 96% 91% 94% 96%   Weight: 166 lb (75.3 kg)      Height:           Intake/Output Summary (Last 24 hours) at 9/26/2019 1300  Last data filed at 9/26/2019 0933  Gross per 24 hour   Intake 600 ml   Output 1400 ml   Net -800 ml     Physical Exam:          Constitutional:  the patient is well developed and in no acute distress  EENMT:  Sclera clear, pupils equal, oral mucosa moist  Respiratory: clear  Cardiovascular:  RRR without M,G,R  Gastrointestinal: soft and non-tender; with positive bowel sounds. Musculoskeletal: warm without cyanosis. There is no lower extremity edema.   Skin:  no jaundice or rashes, surgicaL wounds   Neurologic: no gross neuro deficits     Psychiatric:  alert and oriented x 3    CXR: 9/25: tiny L effusion, otherwise clear       9/24      LAB  Recent Labs     09/26/19  1126 09/26/19  0621 09/25/19  2158 09/25/19  1545 09/25/19  1126   GLUCPOC 242* 210* 227* 178* 190*      Recent Labs     09/25/19  0623 09/24/19  0352 09/23/19  2111   WBC 11.7* 10.0  -- HGB 9.8* 10.3* 10.6*   HCT 30.5* 31.9* 33.3*    162  --      Recent Labs     09/26/19  0346 09/25/19  0623 09/24/19  0352   NA  --  136 141   K 4.0 4.3 4.2   CL  --  102 110*   CO2  --  29 25   GLU  --  177* 100   BUN  --  12 11   CREA  --  0.45* 0.43*   MG 1.7* 1.8 2.1   CA  --  8.7 8.1*     No results for input(s): PH, PCO2, PO2, HCO3, PHI, PCO2I, PO2I, HCO3I in the last 72 hours. Assessment:  (Medical Decision Making)     Hospital Problems  Date Reviewed: 9/11/2019          Codes Class Noted POA    CAD (coronary artery disease) ICD-10-CM: I25.10  ICD-9-CM: 414.00  9/16/2019 Yes        Encounter for weaning from ventilator [facility])   extubated  ICD-10-CM: Z99.11  ICD-9-CM: V46.13  9/23/2019 Unknown        Atelectasis ICD-10-CM: J98.11  ICD-9-CM: 518.0  9/23/2019 Unknown        Hypoxia on 4L NC  ICD-10-CM: R09.02  ICD-9-CM: 799.02  9/23/2019 Unknown        DM2 (diabetes mellitus, type 2) ([facility] ICD-10-CM: E11.9  ICD-9-CM: 250.00  6/21/2012 Yes    Overview Signed 9/20/2016  9:58 AM by Patrice Walters     followed by PCP               HLD (hyperlipidemia) ICD-10-CM: E78.5  ICD-9-CM: 272.4  6/21/2012 Yes    Overview Signed 9/20/2016  9:59 AM by Patrice Walters     followed by PCP               HTN (hypertension) ICD-10-CM: I10  ICD-9-CM: 401.9  6/21/2012 Yes    Overview Signed 9/20/2016  9:58 AM by Patrice Walters     readings in target range                 Plan:  (Medical Decision Making)     Continue IS  Let us know if we can be of further service. Will sign off today since respiratory status is stable on RA. More than 50% of the time documented was spent in face-to-face contact with the patient and in the care of the patient on the floor/unit where the patient is located.     Royann Castleman, MD

## 2019-09-26 NOTE — PROGRESS NOTES
[facility] CARDIOLOGY PROGRESS NOTE           9/26/2019 7:21 AM    Admit Date: 9/16/2019      Subjective:   Patient POD#3 after CABG. Notes sternotomy pain well controlled. Hemodynamically stable. In sinus rhythm. On room air. Main issue is persistent nausea. ROS:  Cardiovascular:  As noted above    Objective:      Vitals:    09/25/19 2159 09/25/19 2257 09/26/19 0333 09/26/19 0734   BP: 109/53 106/51 114/58 109/53   Pulse: 83 84 78 72   Resp:  20 18 18   Temp:  98.1 °F (36.7 °C) 98.3 °F (36.8 °C) 98 °F (36.7 °C)   SpO2:  94% 96% 91%   Weight:   75.3 kg (166 lb)    Height:           Physical Exam:  General-No Acute Distress  Neck- supple, no JVD  CV- regular rate and rhythm no MRG  Lung- clear bilaterally  Abd- soft, nontender, nondistended  Ext- trivial edema bilaterally. Skin- warm and dry      Data Review:   Recent Labs     09/26/19  0346 09/25/19  0623 09/24/19  0352  09/23/19  1222   NA  --  136 141  --  145   K 4.0 4.3 4.2   < > 3.5   MG 1.7* 1.8 2.1   < > 3.1*   BUN  --  12 11  --  15   CREA  --  0.45* 0.43*  --  0.73   GLU  --  177* 100  --  133*   WBC  --  11.7* 10.0  --  13.0*   HGB  --  9.8* 10.3*   < > 11.1*   HCT  --  30.5* 31.9*   < > 34.1*   PLT  --  166 162  --  164   INR  --   --   --   --  1.1    < > = values in this interval not displayed. No results found for: ABDULAZIZ Rodriguez    Echo (9/17/19):  -  Left ventricle: Systolic function was normal. Ejection fraction was estimated in the range of 60 % to 65 %. There were no regional wall motion  abnormalities. Left ventricular diastolic function parameters were abnormal.  -  Right ventricle: There was mild pulmonary artery hypertension.  -  Aortic valve: The valve was trileaflet. Leaflets exhibited mild sclerosis. -  Mitral valve: There was mild regurgitation. -  Tricuspid valve: There was mild to moderate regurgitation.     Assessment/Plan:     Active Problems:    CAD (coronary artery disease) (9/16/2019) Multi-vessel CAD at cath. POD #3 after CABG. Normal pre-op EF. Allergic to Aspirin. Start Plavix when OK with CT surgery. DM2 (diabetes mellitus, type 2) ([facility] (6/21/2012)    On insulin. Monitor glucose. HLD (hyperlipidemia) (6/21/2012)    ON Lipitor. HTN (hypertension) (6/21/2012)    BP controlled. On Lopressor and Norvasc. Hypoxia (9/23/2019)    Incentive spirometry and wean oxygen as tolerated. Nausea  May be related to amiodarone. No arrhythmias. Will stop amiodarone and follow.                    Aidan Orosco MD  9/26/2019 7:21 AM

## 2019-09-26 NOTE — PROGRESS NOTES
Problem: Self Care Deficits Care Plan (Adult)  Goal: *Acute Goals and Plan of Care (Insert Text)  Description  1. Pt will toilet with SBA   2. Pt will complete functional mobility for ADLs with SBA  3. Pt will complete lower body dressing with SBA using AE as needed  4. Pt will complete grooming and hygiene at sink with SBA  5. Pt will demonstrate independence with HEP to promote increased BUE strength,coordination ,and functional use for ADLs  6. Pt will tolerate 23 minutes functional activity with min or fewer rest breaks to promote increased endurance for ADLs  7. Pt will complete bed mobility with SBA in prep for ADLs    Timeframe: 7 days     Outcome: Progressing Towards Goal     OCCUPATIONAL THERAPY: Initial Assessment and Daily Note 9/26/2019  INPATIENT: OT Visit Days: 1  Payor: Gilberto Eastman / Plan: [address] [place] [address] HMO / Product Type: [employer] /      NAME/AGE/GENDER: Berto Cadet is a 75 y.o. female   PRIMARY DIAGNOSIS:  Abnormal nuclear stress test [R94.39]  CAD (coronary artery disease) [I25.10] S/P CABG x 3   S/P CABG x 3    Procedure(s) (LRB):  CORONARY ARTERY BYPASS GRAFT (CABG X 3)/ LIMA (N/A)  VEIN HARVEST/ GREATER SAPHENOUS (Left)  ESOPHAGEAL TRANS ECHOCARDIOGRAM (N/A)  3 Days Post-Op  ICD-10: Treatment Diagnosis:    · Generalized Muscle Weakness (M62.81)   Precautions/Allergies:    Sternal, falls Aspirin; Codeine; Keflex [cephalexin]; Pcn [penicillins]; and Sulfa dyne      ASSESSMENT:     Ms. Marita Merino is s/p CABG X3. Pt lives with her daughter who does not work and is home with her. Pt is independent at baseline and normally does not use an AD for mobility, endorses at least one recent fall. This session, pt presented generally weak with deficits in endurance, strength, coordination, mobility, and balance impacting ADLs. Pt educated on sternal precautions and on adaptive techniques to maintain during ADLs and mobility for ADLs.  Pt stood with CGA, demonstrated mobility in room and bathroom, sink and toilet transfers with CGA-min A using RW. Pt educated on fall prevention techniques including overall use of RW, positioning, hand placement, and functional approaches during ADLs and mobility for ADLs. Pt toileted with CGA, completed grooming and hygiene at sink with CGA to wash hands and face and comb hair, min A for oral cares to open toothpaste and remove wrapper from toothbrush. BUE strength is generally decreased (grossly 4/5), especially hands (grossly 4-/5) with impaired [address]. Pt is below her functional baseline and would benefit from skilled OT services to address deficits. This section established at most recent assessment   PROBLEM LIST (Impairments causing functional limitations):  1. Decreased Strength  2. Decreased ADL/Functional Activities  3. Decreased Transfer Abilities  4. Decreased Balance  5. Decreased Activity Tolerance  6. Increased Fatigue   INTERVENTIONS PLANNED: (Benefits and precautions of occupational therapy have been discussed with the patient.)  1. Activities of daily living training  2. Adaptive equipment training  3. Balance training  4. Therapeutic activity  5. Therapeutic exercise     TREATMENT PLAN: Frequency/Duration: Follow patient 3 times/ week to address above goals. Rehabilitation Potential For Stated Goals: Good     REHAB RECOMMENDATIONS (at time of discharge pending progress):    Placement: It is my opinion, based on this patient's performance to date, that Ms. Lawyer Pham may benefit from participating in 1-2 additional sessions to determine d/c recommendations. May benefit from STR.   Equipment:    3:1 BSC as shower chair               OCCUPATIONAL PROFILE AND HISTORY:   History of Present Injury/Illness (Reason for Referral):  See H&P  Past Medical History/Comorbidities:   Ms. Lawyer Pham  has a past medical history of Anxiety and depression, Arthritis, CAD (coronary artery disease) (9/16/2019), Chest discomfort, Chronic back pain, COPD (chronic obstructive pulmonary disease) ([facility] DM2 (diabetes mellitus, type 2) ([facility] (6/21/2012), Frequent UTI, GERD (gastroesophageal reflux disease), H/O seasonal allergies, Heart murmur, Hiatal hernia, History of kidney stones, HLD (hyperlipidemia) (6/21/2012), HTN (hypertension) (6/21/2012), Hypothyroidism, Psychiatric disorder, and Varicose veins (6/21/2012). She also has no past medical history of Arrhythmia, Asthma, Autoimmune disease ([address] Cancer ([address] Chronic kidney disease, Difficult intubation, Heart failure ([address] Liver disease, Malignant hyperthermia due to anesthesia, Nausea & vomiting, Pseudocholinesterase deficiency, PUD (peptic ulcer disease), Seizures ([address] Sleep apnea, Stroke ([address] or Thromboembolus ([facility] Ms. Lawyer Pham  has a past surgical history that includes hx appendectomy (1976); hx cholecystectomy (1997); hx hernia repair (1997); hx tubal ligation (1976); hx hysterectomy (1982); hx gyn (1997); hx urological (1997); hx heart catheterization (2007); hx cyst removal (1973); hx orthopaedic (1985); hx cataract removal (2002 and 2006); hx mohs procedure; endoscopy, colon, diagnostic (2011); and hx breast biopsy.   Social History/Living Environment:   Home Environment: Trailer/mobile home  # Steps to Enter: 0  Wheelchair Ramp: Yes  One/Two Story Residence: One story  Living Alone: No  Support Systems: Child(jose)  Patient Expects to be Discharged to[de-identified] Private residence  Current DME Used/Available at Home: Lugene Kays, straight, Walker, rollator, Walker, rolling, Raised toilet seat  Tub or Shower Type: Shower  Prior Level of Function/Work/Activity:  Independent, lives with daughter, does not use an AD     Number of Personal Factors/Comorbidities that affect the Plan of Care: Expanded review of therapy/medical records (1-2):  MODERATE COMPLEXITY   ASSESSMENT OF OCCUPATIONAL PERFORMANCE[de-identified]   Activities of Daily Living:   Basic ADLs (From Assessment) Complex ADLs (From Assessment)   Feeding: Setup  Oral Facial Hygiene/Grooming: Minimum assistance  Bathing: Minimum assistance  Upper Body Dressing: Contact guard assistance  Lower Body Dressing: Minimum assistance  Toileting: Contact guard assistance Instrumental ADL  Meal Preparation: Moderate assistance  Homemaking: Maximum assistance   Grooming/Bathing/Dressing Activities of Daily Living   Grooming  Washing Face: Contact guard assistance  Washing Hands: Contact guard assistance  Brushing Teeth: Minimum assistance  Brushing/Combing Hair: Contact guard assistance Cognitive Retraining  Safety/Judgement: Awareness of environment           Toileting  Toileting Assistance: Contact guard assistance     Functional Transfers  Bathroom Mobility: Contact guard assistance  Toilet Transfer : Contact guard assistance     Bed/Mat Mobility  Sit to Stand: Contact guard assistance  Stand to Sit: Contact guard assistance  Bed to Chair: Contact guard assistance     Most Recent Physical Functioning:   Gross Assessment:  AROM: Generally decreased, functional  Strength: Generally decreased, functional  Coordination: Generally decreased, functional               Posture:  Posture (WDL): Exceptions to WDL  Posture Assessment: Rounded shoulders  Balance:  Sitting: Intact  Standing: Impaired  Standing - Static: Fair  Standing - Dynamic : Fair Bed Mobility:     Wheelchair Mobility:     Transfers:  Sit to Stand: Contact guard assistance  Stand to Sit: Contact guard assistance  Bed to Chair: Contact guard assistance            Patient Vitals for the past 6 hrs:   BP SpO2 Pulse   09/26/19 0734 109/53 91 % 72   09/26/19 0807  94 %    09/26/19 1124 111/53 96 % 66       Mental Status  Neurologic State: Alert  Orientation Level: Oriented X4  Cognition: Follows commands  Perception: Appears intact  Perseveration: No perseveration noted  Safety/Judgement: Awareness of environment                          Physical Skills Involved:  1. Balance  2. Strength  3. Activity Tolerance  4.  Fine Motor Control Cognitive Skills Affected (resulting in the inability to perform in a timely and safe manner):  1. Executive Function Psychosocial Skills Affected:  1. Habits/Routines  2. Environmental Adaptation   Number of elements that affect the Plan of Care: 3-5:  MODERATE COMPLEXITY   CLINICAL DECISION MAKIN[address] [place] [address] AM-PAC 6 Clicks   Daily Activity Inpatient Short Form  How much help from another person does the patient currently need. .. Total A Lot A Little None   1. Putting on and taking off regular lower body clothing? ? 1   ? 2   ? 3   ? 4   2. Bathing (including washing, rinsing, drying)? ? 1   ? 2   ? 3   ? 4   3. Toileting, which includes using toilet, bedpan or urinal?   ? 1   ? 2   ? 3   ? 4   4. Putting on and taking off regular upper body clothing? ? 1   ? 2   ? 3   ? 4   5. Taking care of personal grooming such as brushing teeth? ? 1   ? 2   ? 3   ? 4   6. Eating meals? ? 1   ? 2   ? 3   ? 4   © , Trustees of [address] [place], [place] [id number], under license to [employer]. All rights reserved      Score:  Initial: 18 Most Recent: X (Date: -- )    Interpretation of Tool:  Represents activities that are increasingly more difficult (i.e. Bed mobility, Transfers, Gait). Medical Necessity:     · Patient demonstrates good  ·  rehab potential due to higher previous functional level. Reason for Services/Other Comments:  · Patient continues to require present interventions due to patient's inability to complete ADLs   · . Use of outcome tool(s) and clinical judgement create a POC that gives a: MODERATE COMPLEXITY         TREATMENT:   (In addition to Assessment/Re-Assessment sessions the following treatments were rendered)     Pre-treatment Symptoms/Complaints:    Pain: Initial:   Pain Intensity 1: 0  Post Session:  0     Self Care: (10 min): Procedure(s) (per grid) utilized to improve and/or restore self-care/home management as related to dressing, bathing, toileting and grooming.  Required minimal   cueing to facilitate activities of daily living skills and compensatory activities. Braces/Orthotics/Lines/Etc:   · O2 Device: Room air  Treatment/Session Assessment:    · Response to Treatment:  no adverse reaction   · Interdisciplinary Collaboration:   o Occupational Therapist  o Registered Nurse  · After treatment position/precautions:   o Up in chair  o Bed/Chair-wheels locked  o Call light within reach  o RN notified  o Family at bedside   · Compliance with Program/Exercises: Will assess as treatment progresses. · Recommendations/Intent for next treatment session: \"Next visit will focus on advancements to more challenging activities and reduction in assistance provided\".   Total Treatment Duration:  OT Patient Time In/Time Out  Time In: 8094  Time Out: Naveen Alvarado

## 2019-09-26 NOTE — PROGRESS NOTES
Problem: Mobility Impaired (Adult and Pediatric)  Goal: *Acute Goals and Plan of Care (Insert Text)  Description  LTG:  (1.)Ms. Guillermo Lambert will move from supine to sit and sit to supine , scoot up and down and roll side to side with INDEPENDENT within 7 treatment day(s) from flat surface without handrail. (2.)Ms. Guillermo Lambert will transfer from bed to chair and chair to bed with INDEPENDENT using the least restrictive device within 7 treatment day(s). (3.)Ms. Guillermo Lambert will ambulate with MODIFIED INDEPENDENCE for 500+ feet with the least restrictive device within 7 treatment day(s) while maintaining normal vital signs. _____________________________________________________________________________________________   Outcome: Progressing Towards Goal     PHYSICAL THERAPY: Daily Note and AM 9/26/2019  INPATIENT: PT Visit Days : 3  Payor: Brigid Mclaughlin / Plan: [address] [place] [address] HMO / Product Type: [employer] /       NAME/AGE/GENDER: Valeria Lopez is a 75 y.o. female   PRIMARY DIAGNOSIS: Abnormal nuclear stress test [R94.39]  CAD (coronary artery disease) [I25.10] S/P CABG x 3   S/P CABG x 3    Procedure(s) (LRB):  CORONARY ARTERY BYPASS GRAFT (CABG X 3)/ LIMA (N/A)  VEIN HARVEST/ GREATER SAPHENOUS (Left)  ESOPHAGEAL TRANS ECHOCARDIOGRAM (N/A)  3 Days Post-Op  ICD-10: Treatment Diagnosis:    · Difficulty in walking, Not elsewhere classified (R26.2)   Precaution/Allergies:  Aspirin; Codeine; Keflex [cephalexin]; Pcn [penicillins]; and Sulfa dyne      ASSESSMENT:     Ms. Guillermo Lambert presents with decreased bed mobility, transfers, ambulation, balance, activity tolerance, strength and overall general functional mobility s/p hospital admission with CABG x 3 on 9/23/19. Pt states independent at baseline, uses cane at times on unlevel surfaces but mostly independent with ambulation. Pt drives, reports some falls at times but none in past 6 months. Pt lives with family. Pt sitting up in chair on contact.  With encouragement she agreed to work with therapy. CGA\] for sit to stand. She ambulated with RW for 76' very slowly with flexed posture. Pt required cues for walker safety and posture. Ambulates extremely slowly. Pt returned to room and sat in chair. She performed bilateral LE ex as listed below. PT to follow for acute care needs to address deficits noted above. Discharge needs pending progress with activity. This section established at most recent assessment   PROBLEM LIST (Impairments causing functional limitations):  1. Decreased ADL/Functional Activities  2. Decreased Transfer Abilities  3. Decreased Ambulation Ability/Technique  4. Decreased Balance  5. Increased Pain  6. Decreased Activity Tolerance  7. Increased Fatigue  8. Decreased [place] with Home Exercise Program   INTERVENTIONS PLANNED: (Benefits and precautions of physical therapy have been discussed with the patient.)  1. Balance Exercise  2. Bed Mobility  3. Family Education  4. Gait Training  5. Home Exercise Program (HEP)  6. Therapeutic Activites  7. Therapeutic Exercise/Strengthening  8. Transfer Training     TREATMENT PLAN: Frequency/Duration: twice daily for duration of hospital stay  Rehabilitation Potential For Stated Goals: Good     REHAB RECOMMENDATIONS (at time of discharge pending progress):    Placement: It is my opinion, based on this patient's performance to date, that Ms. Mehul Mondragon may benefit from participating in 1-2 additional therapy sessions in order to continue to assess for rehab potential and then make recommendation for disposition at discharge.   Equipment:    None at this time              HISTORY:   History of Present Injury/Illness (Reason for Referral):  S/p CABG x 3, generalized weakness  Past Medical History/Comorbidities:   Ms. Mehul Mondragon  has a past medical history of Anxiety and depression, Arthritis, CAD (coronary artery disease) (9/16/2019), Chest discomfort, Chronic back pain, COPD (chronic obstructive pulmonary disease) ([facility] [address] DM2 (diabetes mellitus, type 2) ([facility] [address] (6/21/2012), Frequent UTI, GERD (gastroesophageal reflux disease), H/O seasonal allergies, Heart murmur, Hiatal hernia, History of kidney stones, HLD (hyperlipidemia) (6/21/2012), HTN (hypertension) (6/21/2012), Hypothyroidism, Psychiatric disorder, and Varicose veins (6/21/2012). She also has no past medical history of Arrhythmia, Asthma, Autoimmune disease ([address] Cancer ([address] Chronic kidney disease, Difficult intubation, Heart failure ([address] Liver disease, Malignant hyperthermia due to anesthesia, Nausea & vomiting, Pseudocholinesterase deficiency, PUD (peptic ulcer disease), Seizures ([address] Sleep apnea, Stroke ([address] or Thromboembolus ([facility] [address] Ms. Kathia Huggins  has a past surgical history that includes hx appendectomy (1976); hx cholecystectomy (1997); hx hernia repair (1997); hx tubal ligation (1976); hx hysterectomy (1982); hx gyn (1997); hx urological (1997); hx heart catheterization (2007); hx cyst removal (1973); hx orthopaedic (1985); hx cataract removal (2002 and 2006); hx mohs procedure; endoscopy, colon, diagnostic (2011); and hx breast biopsy. Social History/Living Environment:   Home Environment: Trailer/mobile home  # Steps to Enter: 0  Wheelchair Ramp: Yes  One/Two Story Residence: One story  Living Alone: No  Support Systems: Child(jose)  Patient Expects to be Discharged to[de-identified] Private residence  Current DME Used/Available at Home: Kirsten Ridgel, straight, Walker, rollator, Walker, rolling, Raised toilet seat  Tub or Shower Type: Shower  Prior Level of Function/Work/Activity:  Independent at baseline to [employer]  Personal Factors:          Sex:  female        Age:  75 y.o.         Overall Behavior:  agreeable, drowsy    Number of Personal Factors/Comorbidities that affect the Plan of Care:  Age, COPD, DM, CAD 3+: HIGH COMPLEXITY   EXAMINATION:   Most Recent Physical Functioning:   Gross Assessment:                  Posture:     Balance:  Sitting: Intact  Standing: Impaired  Standing - Static: Fair  Standing - Dynamic : Fair Bed Mobility:     Wheelchair Mobility:     Transfers:  Sit to Stand: Contact guard assistance  Stand to Sit: Contact guard assistance  Gait:     Base of Support: Narrowed  Speed/Stephania: Shuffled; Slow  Step Length: Left shortened;Right shortened  Gait Abnormalities: Decreased step clearance;Shuffling gait  Distance (ft): 75 Feet (ft)  Assistive Device: Walker, rolling  Ambulation - Level of Assistance: Stand-by assistance;Contact guard assistance  Interventions: Safety awareness training;Verbal cues      Body Structures Involved:  1. Heart  2. Lungs  3. Muscles Body Functions Affected:  1. Cardio  2. Respiratory  3. Movement Related Activities and Participation Affected:  1. General Tasks and Demands  2. Mobility  3. Self Care   Number of elements that affect the Plan of Care: 4+: HIGH COMPLEXITY   CLINICAL PRESENTATION:   Presentation: Evolving clinical presentation with changing clinical characteristics: MODERATE COMPLEXITY   CLINICAL DECISION MAKIN [facility] Inpatient Short Form  How much difficulty does the patient currently have. .. Unable A Lot A Little None   1. Turning over in bed (including adjusting bedclothes, sheets and blankets)? ? 1   ? 2   ? 3   ? 4   2. Sitting down on and standing up from a chair with arms ( e.g., wheelchair, bedside commode, etc.)   ? 1   ? 2   ? 3   ? 4   3. Moving from lying on back to sitting on the side of the bed?   ? 1   ? 2   ? 3   ? 4   How much help from another person does the patient currently need. .. Total A Lot A Little None   4. Moving to and from a bed to a chair (including a wheelchair)? ? 1   ? 2   ? 3   ? 4   5. Need to walk in hospital room? ? 1   ? 2   ? 3   ? 4   6. Climbing 3-5 steps with a railing? ? 1   ? 2   ? 3   ? 4   © , [address] [address], under license to [facility].  All rights reserved      Score: Initial: 15 Most Recent: X (Date: -- )    Interpretation of Tool:  Represents activities that are increasingly more difficult (i.e. Bed mobility, Transfers, Gait). Medical Necessity:     · Patient is expected to demonstrate progress in strength, range of motion, balance and coordination  ·  to decrease assistance required with overall functional mobility, transfers, ambulation   · .  · Patient demonstrates good  ·  rehab potential due to higher previous functional level. Reason for Services/Other Comments:  · Patient continues to require present interventions due to patient's inability to perform bed mobility, transfers, ambulation safely and effectively   · . Use of outcome tool(s) and clinical judgement create a POC that gives a: Clear prediction of patient's progress: LOW COMPLEXITY            TREATMENT:   (In addition to Assessment/Re-Assessment sessions the following treatments were rendered)   Pre-treatment Symptoms/Complaints:  \"I've already walked today. .. I walked to the bathroom. \"  Pain: Initial: 0     Post Session:   appears comfortable in chair     Therapeutic Activity: (    23 min): Therapeutic activities including Chair , Ambulation on level ground and walker safety, posture, LE ex  to improve mobility, strength, balance and coordination. Required minimal Safety awareness training;Verbal cues to promote static and dynamic balance in standing and promote coordination of bilateral, lower extremity(s).       Date:  9/25/19 Date:  9/26/19 Date:     Activity/Exercise Seated Parameters Parameters Parameters   Heel raises X 15 B X 20 B    Toe raises X 15 B X 20 B    LAQ's X 15 B X 10 B    Hip Flex X 15 B X 10 B    Hip ABD X 15 B X 10 B                          Braces/Orthotics/Lines/Etc:   · O2 Device: Nasal cannula  Treatment/Session Assessment:    · Response to Treatment:  tolerated well  · Interdisciplinary Collaboration:   o Physical Therapy Assistant  o Registered Nurse  · After treatment position/precautions:   o Up in chair  o Bed alarm/tab alert on  o Bed/Chair-wheels locked  o Caregiver at bedside  o Call light within reach  o RN notified  o Family at bedside   · Compliance with Program/Exercises: Will assess as treatment progresses  · Recommendations/Intent for next treatment session: \"Next visit will focus on advancements to more challenging activities\".   Total Treatment Duration:  PT Patient Time In/Time Out  Time In: 1002  Time Out: [address], PTA

## 2019-09-27 VITALS
WEIGHT: 162.9 LBS | HEIGHT: 62 IN | TEMPERATURE: 97.6 F | SYSTOLIC BLOOD PRESSURE: 142 MMHG | DIASTOLIC BLOOD PRESSURE: 65 MMHG | HEART RATE: 76 BPM | BODY MASS INDEX: 29.98 KG/M2 | OXYGEN SATURATION: 93 % | RESPIRATION RATE: 18 BRPM

## 2019-09-27 LAB
ERYTHROCYTE [DISTWIDTH] IN BLOOD BY AUTOMATED COUNT: 13 % (ref 11.9–14.6)
GLUCOSE BLD STRIP.AUTO-MCNC: 122 MG/DL (ref 65–100)
GLUCOSE BLD STRIP.AUTO-MCNC: 288 MG/DL (ref 65–100)
HCT VFR BLD AUTO: 29.7 % (ref 35.8–46.3)
HGB BLD-MCNC: 9.4 G/DL (ref 11.7–15.4)
MAGNESIUM SERPL-MCNC: 1.9 MG/DL (ref 1.8–2.4)
MCH RBC QN AUTO: 33.3 PG (ref 26.1–32.9)
MCHC RBC AUTO-ENTMCNC: 31.6 G/DL (ref 31.4–35)
MCV RBC AUTO: 105.3 FL (ref 79.6–97.8)
NRBC # BLD: 0 K/UL (ref 0–0.2)
PLATELET # BLD AUTO: 249 K/UL (ref 150–450)
PMV BLD AUTO: 10.8 FL (ref 9.4–12.3)
POTASSIUM SERPL-SCNC: 3.9 MMOL/L (ref 3.5–5.1)
RBC # BLD AUTO: 2.82 M/UL (ref 4.05–5.2)
WBC # BLD AUTO: 7.6 K/UL (ref 4.3–11.1)

## 2019-09-27 PROCEDURE — 74011636637 HC RX REV CODE- 636/637: Performed by: FAMILY MEDICINE

## 2019-09-27 PROCEDURE — 74011250637 HC RX REV CODE- 250/637: Performed by: INTERNAL MEDICINE

## 2019-09-27 PROCEDURE — 85027 COMPLETE CBC AUTOMATED: CPT

## 2019-09-27 PROCEDURE — 94760 N-INVAS EAR/PLS OXIMETRY 1: CPT

## 2019-09-27 PROCEDURE — 83735 ASSAY OF MAGNESIUM: CPT

## 2019-09-27 PROCEDURE — 84132 ASSAY OF SERUM POTASSIUM: CPT

## 2019-09-27 PROCEDURE — 97530 THERAPEUTIC ACTIVITIES: CPT

## 2019-09-27 PROCEDURE — 74011250637 HC RX REV CODE- 250/637: Performed by: THORACIC SURGERY (CARDIOTHORACIC VASCULAR SURGERY)

## 2019-09-27 PROCEDURE — 77030012890

## 2019-09-27 PROCEDURE — 82962 GLUCOSE BLOOD TEST: CPT

## 2019-09-27 PROCEDURE — 36415 COLL VENOUS BLD VENIPUNCTURE: CPT

## 2019-09-27 RX ORDER — LIDOCAINE 50 MG/G
OINTMENT TOPICAL AS NEEDED
Status: DISCONTINUED | OUTPATIENT
Start: 2019-09-27 | End: 2019-09-27 | Stop reason: HOSPADM

## 2019-09-27 RX ORDER — ATORVASTATIN CALCIUM 80 MG/1
80 TABLET, FILM COATED ORAL DAILY
Qty: 90 TAB | Refills: 3 | Status: SHIPPED
Start: 2019-09-27 | End: 2019-10-22 | Stop reason: SDUPTHER

## 2019-09-27 RX ORDER — LIDOCAINE 50 MG/G
OINTMENT TOPICAL AS NEEDED
Qty: 1 TUBE | Refills: 0 | Status: SHIPPED | OUTPATIENT
Start: 2019-09-27

## 2019-09-27 RX ORDER — FACIAL-BODY WIPES
10 EACH TOPICAL DAILY PRN
Status: DISCONTINUED | OUTPATIENT
Start: 2019-09-27 | End: 2019-09-27 | Stop reason: HOSPADM

## 2019-09-27 RX ORDER — METOPROLOL SUCCINATE 25 MG/1
25 TABLET, EXTENDED RELEASE ORAL DAILY
Status: DISCONTINUED | OUTPATIENT
Start: 2019-09-27 | End: 2019-09-27 | Stop reason: HOSPADM

## 2019-09-27 RX ORDER — SCOLOPAMINE TRANSDERMAL SYSTEM 1 MG/1
1 PATCH, EXTENDED RELEASE TRANSDERMAL
Qty: 2 PATCH | Refills: 0 | Status: SHIPPED
Start: 2019-09-28 | End: 2019-10-02

## 2019-09-27 RX ORDER — INSULIN LISPRO 100 [IU]/ML
7 INJECTION, SOLUTION INTRAVENOUS; SUBCUTANEOUS
Qty: 1 VIAL | Refills: 0 | Status: SHIPPED
Start: 2019-09-27 | End: 2019-10-15 | Stop reason: SDUPTHER

## 2019-09-27 RX ORDER — NITROGLYCERIN 0.4 MG/1
0.4 TABLET SUBLINGUAL
Qty: 2 BOTTLE | Refills: 4 | Status: SHIPPED
Start: 2019-09-27

## 2019-09-27 RX ORDER — LISINOPRIL 2.5 MG/1
2.5 TABLET ORAL DAILY
Qty: 30 TAB | Refills: 3 | Status: SHIPPED
Start: 2019-09-28 | End: 2019-10-22 | Stop reason: SDUPTHER

## 2019-09-27 RX ORDER — TRAMADOL HYDROCHLORIDE 50 MG/1
50 TABLET ORAL
Qty: 30 TAB | Refills: 0 | Status: SHIPPED | OUTPATIENT
Start: 2019-09-27 | End: 2019-10-27

## 2019-09-27 RX ORDER — OXYCODONE AND ACETAMINOPHEN 5; 325 MG/1; MG/1
1 TABLET ORAL
Qty: 10 TAB | Refills: 0 | Status: SHIPPED | OUTPATIENT
Start: 2019-09-27 | End: 2019-09-27

## 2019-09-27 RX ORDER — ACETAMINOPHEN 325 MG/1
650 TABLET ORAL
Status: SHIPPED | COMMUNITY
Start: 2019-09-27 | End: 2019-10-13 | Stop reason: DRUGHIGH

## 2019-09-27 RX ADMIN — FERROUS SULFATE TAB 325 MG (65 MG ELEMENTAL FE) 325 MG: 325 (65 FE) TAB at 06:14

## 2019-09-27 RX ADMIN — MUPIROCIN: 20 OINTMENT TOPICAL at 09:42

## 2019-09-27 RX ADMIN — CLOPIDOGREL BISULFATE 75 MG: 75 TABLET ORAL at 09:35

## 2019-09-27 RX ADMIN — POTASSIUM CHLORIDE 20 MEQ: 20 TABLET, EXTENDED RELEASE ORAL at 09:46

## 2019-09-27 RX ADMIN — INSULIN LISPRO 7 UNITS: 100 INJECTION, SOLUTION INTRAVENOUS; SUBCUTANEOUS at 09:38

## 2019-09-27 RX ADMIN — POTASSIUM CHLORIDE 20 MEQ: 20 TABLET, EXTENDED RELEASE ORAL at 06:14

## 2019-09-27 RX ADMIN — METOPROLOL SUCCINATE 12.5 MG: 25 TABLET, EXTENDED RELEASE ORAL at 09:35

## 2019-09-27 RX ADMIN — POTASSIUM CHLORIDE 10 MEQ: 10 TABLET, EXTENDED RELEASE ORAL at 09:45

## 2019-09-27 RX ADMIN — LACTULOSE 30 ML: 20 SOLUTION ORAL at 12:29

## 2019-09-27 RX ADMIN — Medication 10 ML: at 06:15

## 2019-09-27 RX ADMIN — FUROSEMIDE 40 MG: 40 TABLET ORAL at 09:35

## 2019-09-27 RX ADMIN — BISACODYL 10 MG: 10 SUPPOSITORY RECTAL at 09:36

## 2019-09-27 RX ADMIN — INSULIN LISPRO 6 UNITS: 100 INJECTION, SOLUTION INTRAVENOUS; SUBCUTANEOUS at 12:46

## 2019-09-27 RX ADMIN — ACETAMINOPHEN 650 MG: 325 TABLET, FILM COATED ORAL at 09:35

## 2019-09-27 RX ADMIN — FAMOTIDINE 20 MG: 20 TABLET ORAL at 09:35

## 2019-09-27 RX ADMIN — ACETAMINOPHEN 650 MG: 325 TABLET, FILM COATED ORAL at 14:01

## 2019-09-27 RX ADMIN — INSULIN LISPRO 7 UNITS: 100 INJECTION, SOLUTION INTRAVENOUS; SUBCUTANEOUS at 12:45

## 2019-09-27 NOTE — PROGRESS NOTES
Bedside shift change report given to Jose Pat (oncoming nurse) by Jasmyn Addison (offgoing nurse). Report included the following information SBAR, Kardex, Intake/Output, MAR, Recent Results and Cardiac Rhythm NSR.

## 2019-09-27 NOTE — DIABETES MGMT
Patient's blood glucose ranged 107- 242 yesterday with patient receiving Lantus 20 units and Humalog 27 units. Blood glucose 122 this morning. Reviewed with patient. Patient encouraged to be compliant with discharge regimen and to follow up with PCP for titration of regimen. Patient hopes to initially discharge to [facility]. \"My granddaughter wants me to go home, but I think I would do better at a rehab. They might stop by with one meal, but I'd be left to myself and I think that's too much for me to do on my own right now. \" Agreed with patient that rehab would help her build her strengh up so she could be more independent at home.

## 2019-09-27 NOTE — PROGRESS NOTES
Problem: Falls - Risk of  Goal: *Absence of Falls  Description  Document Velasquez Lan Fall Risk and appropriate interventions in the flowsheet. Outcome: Progressing Towards Goal  Note:   Fall Risk Interventions:  Mobility Interventions: Communicate number of staff needed for ambulation/transfer, Patient to call before getting OOB, PT Consult for mobility concerns, Strengthening exercises (ROM-active/passive)    Mentation Interventions: Adequate sleep, hydration, pain control, Increase mobility    Medication Interventions: Patient to call before getting OOB, Teach patient to arise slowly    Elimination Interventions: Call light in reach, Patient to call for help with toileting needs, Stay With Me (per policy), Bed/chair exit alarm    History of Falls Interventions: Bed/chair exit alarm, Consult care management for discharge planning, Door open when patient unattended         Problem: Pressure Injury - Risk of  Goal: *Prevention of pressure injury  Description  Document Tobi Scale and appropriate interventions in the flowsheet.   Outcome: Progressing Towards Goal  Note:   Pressure Injury Interventions:  Sensory Interventions: Assess changes in LOC, Maintain/enhance activity level, Pressure redistribution bed/mattress (bed type)         Activity Interventions: Increase time out of bed, Pressure redistribution bed/mattress(bed type), PT/OT evaluation    Mobility Interventions: HOB 30 degrees or less, Pressure redistribution bed/mattress (bed type), PT/OT evaluation    Nutrition Interventions: Document food/fluid/supplement intake, Offer support with meals,snacks and hydration    Friction and Shear Interventions: Apply protective barrier, creams and emollients, HOB 30 degrees or less

## 2019-09-27 NOTE — DISCHARGE INSTRUCTIONS
Patient Education      Coronary Artery Bypass Graft: What to Expect at Home  Your Recovery    Coronary artery bypass graft (CABG) is surgery to treat coronary artery disease. The surgery helps blood make a detour, or bypass, around one or more narrowed or blocked coronary arteries. Coronary arteries are the blood vessels that bring blood to the heart. Your doctor did the surgery through a cut, called an incision, in your chest.  You will feel tired and sore for the first few weeks after surgery. You may have some brief, sharp pains on either side of your chest. Your chest, shoulders, and upper back may ache. The incision in your chest and the area where the healthy vein was taken may be sore or swollen. These symptoms usually get better after 4 to 6 weeks. You will probably be able to do many of your usual activities after 4 to 6 weeks. But for 2 to 3 months you will not be able to lift heavy objects or do activities that strain your chest or upper arm muscles. At first you may notice that you get tired easily and need to rest often. It may take 1 to 2 months to get your energy back. Some people find that they are more emotional after this surgery. You may cry easily or show emotion in ways that are unusual for you. This is common and may last for up to a year. Some people get depressed after CABG surgery. Talk with your doctor if you have sadness that continues or you are concerned about how you are feeling. Treatment and other support can help you feel better. Even though the surgery may improve your symptoms, you will still need to make changes in your lifestyle to lower your risk of a heart attack or stroke. It will be important to eat a heart-healthy diet, get regular exercise, not smoke, take your heart medicines, and reduce stress.   You will likely start a cardiac rehabilitation (rehab) program in the hospital. You will continue with this rehab program after you go home to help you recover and prevent problems with your heart. Talk to your doctor about whether rehab is right for you. This care sheet gives you a general idea about how long it will take for you to recover. But each person recovers at a different pace. Follow the steps below to get better as quickly as possible. How can you care for yourself at home? Activity    · Rest when you feel tired. Getting enough sleep will help you recover. Try to sleep on your back for 4 to 6 weeks while your breastbone (sternum) heals. This usually takes about 4 to 6 weeks.     · Try to walk each day. Start by walking a little more than you did the day before. Bit by bit, increase the amount you walk. Walking boosts blood flow and helps prevent pneumonia and constipation.     · Avoid strenuous activities, such as bicycle riding, jogging, weight lifting, or heavy aerobic exercise, until your doctor says it is okay.     · For 3 months, avoid activities that strain your chest or upper arm muscles. This includes pushing a  or vacuum, mopping floors, or swinging a golf club or tennis racquet.     · For 2 to 3 months, avoid lifting anything that would make you strain. This may include a child, heavy grocery bags and milk containers, a heavy briefcase or backpack, or cat litter or dog food bags.     · Hold a pillow firmly over your chest incision when you cough or take deep breaths. This will support your chest and reduce your pain.     · Do breathing exercises at home as instructed by your doctor. This will help prevent pneumonia.     · Ask your doctor when you can drive again.     · You will probably need to take 4 to 12 weeks off from work. It depends on the type of work you do and how you feel.     · You may shower as usual. Pat the incision dry.  Do not take a bath for the first 3 weeks, or until your doctor tells you it is okay.     · Do not swim or use a hot tub for at least 1 month, or until your doctor says it is okay.     · Ask your doctor when it is okay for you to have sex. Diet    · Eat a heart-healthy diet. If you have not been eating this way, talk to your doctor. You also may want to talk to a dietitian. A dietitian can help you learn about healthy foods.     · Drink plenty of fluids (unless your doctor tells you not to).     · You may notice that your bowel movements are not regular right after your surgery. This is common. Try to avoid constipation and straining with bowel movements. You may want to take a fiber supplement every day. If you have not had a bowel movement after a couple of days, ask your doctor about taking a mild laxative. Medicines    · Your doctor will tell you if and when you can restart your medicines. He or she will also give you instructions about taking any new medicines.     · If you take blood thinners, such as warfarin (Coumadin), clopidogrel (Plavix), or aspirin, be sure to talk to your doctor. He or she will tell you if and when to start taking those medicines again. Make sure that you understand exactly what your doctor wants you to do.     · Your doctor may give you medicines to prevent blood clots, keep your heartbeat steady, and lower your blood pressure and cholesterol. Take your medicines exactly as prescribed. Call your doctor if you think you are having a problem with your medicine.     · Be safe with medicines. Take pain medicines exactly as directed. ? If the doctor gave you a prescription medicine for pain, take it as prescribed. ? If you are not taking a prescription pain medicine, ask your doctor if you can take an over-the-counter medicine. ? Do not take aspirin, ibuprofen (Advil, Motrin), naproxen (Aleve), or other nonsteroidal anti-inflammatory drugs (NSAIDs) unless your doctor says it is okay.     · If you think your pain medicine is making you sick to your stomach:  ? Take your medicine after meals (unless your doctor has told you not to). ?  Ask your doctor for a different pain medicine.     · If your doctor prescribed antibiotics, take them as directed. Do not stop taking them just because you feel better. You need to take the full course of antibiotics. Incision care    · If you have strips of tape on the incisions the doctor made, leave the tape on for a week or until it falls off.     · Wash the area daily with warm, soapy water, and pat it dry. Don't use hydrogen peroxide or alcohol, which can slow healing. You may cover the area with a gauze bandage if it weeps or rubs against clothing. Change the bandage every day.     · Keep the area clean and dry.     · Do not use any creams, lotions, powders, ointments, or oils unless your doctor tells you it is okay.     · If you have an incision in your leg:  ? Wear support stockings on your legs during the day for the first 2 weeks. You can take the stockings off at night while you sleep. ? Raise your legs above the level of your heart whenever you lay down for the first 4 to 6 weeks. Other instructions    · Keep track of your weight. Weigh yourself every day at the same time of day, on the same scale, in the same amount of clothing. A sudden increase in weight can be a sign of a problem with your heart. Tell your doctor if you suddenly gain weight, such as 3 pounds or more in 2 to 3 days.     · Do not smoke. Smoking can make it harder for you to recover and it will raise the chances of your arteries narrowing again. If you need help quitting, talk to your doctor about stop-smoking programs and medicines. These can increase your chances of quitting for good. Follow-up care is a key part of your treatment and safety. Be sure to make and go to all appointments, and call your doctor if you are having problems. It's also a good idea to know your test results and keep a list of the medicines you take. When should you call for help? Call 911 anytime you think you may need emergency care.  For example, call if:    · You passed out (lost consciousness).     · You have severe trouble breathing.     · You have sudden chest pain and shortness of breath, or you cough up blood.     · You have severe pain in your chest.     · You have symptoms of a heart attack. These may include:  ? Chest pain or pressure, or a strange feeling in the chest.  ? Sweating. ? Shortness of breath. ? Nausea or vomiting. ? Pain, pressure, or a strange feeling in the back, neck, jaw, or upper belly or in one or both shoulders or arms. ? Lightheadedness or sudden weakness. ? A fast or irregular heartbeat. After you call 911, the  may tell you to chew 1 adult-strength or 2 to 4 low-dose aspirin. Wait for an ambulance. Do not try to drive yourself.     · You have angina symptoms (such as chest pain or pressure) that do not go away with rest or are not getting better within 5 minutes after you take a dose of nitroglycerin.    Call your doctor now or seek immediate medical care if:    · You have pain that does not get better after you take pain medicine.     · You have a fever over 100°F.     · You have loose stitches, or your incision comes open.     · Bright red blood has soaked through the bandage over your incision.     · You have signs of infection, such as:  ? Increased pain, swelling, warmth, or redness. ? Red streaks leading from the incision. ? Pus draining from the incision. ? Swollen lymph nodes in your neck, armpits, or groin. ? A fever.     · You have signs of a blood clot in a leg. If you had a vein removed from your leg, you may have tenderness and swelling while your leg heals. But signs of a blood clot may be in a different part of your leg and may include:  ? Pain in your calf, back of the knee, thigh, or groin. ?  Redness and swelling in your leg or groin.     · Your heartbeat feels very fast or slow, skips beats, or flutters.     · You are dizzy or lightheaded, or you feel like you may faint.     · You have new or increased shortness of breath.    Watch closely for changes in your health, and be sure to contact your doctor if:    · You gain weight suddenly, such as 3 pounds or more in 2 to 3 days.     · You have increased swelling in your legs, ankles, or feet.     · You have any concerns about your incision.     · You feel very sad or have other signs of depression, such as trouble sleeping or eating.     · You have questions about diet, exercise, quitting smoking, or stress reduction after surgery. Where can you learn more? Go to http://hattie-dino.info/. Enter F759 in the search box to learn more about \"Coronary Artery Bypass Graft: What to Expect at Home. \"  Current as of: April 9, 2019  Content Version: 12.2  © 7281-4305 [employer]. Care instructions adapted under license by [employer] (which disclaims liability or warranty for this information). If you have questions about a medical condition or this instruction, always ask your healthcare professional. [place] [address] any warranty or liability for your use of this information. Reducing Heart Attack Risk With Daily Medicine: Care Instructions  Your Care Instructions    If you are at risk for a heart attack, there are many medicines that can reduce your risk. These include:  · ACE inhibitors or ARBs. These are types of blood pressure medicines. They can reduce the risk of heart attacks and strokes if you are at high risk. · Statins and other cholesterol medicines. These lower cholesterol. They can also reduce the risk of a stroke. · Aspirin and other antiplatelets. If you are at high risk of a heart attack or stroke and at low risk of bleeding problems, your doctor might talk to you about taking an aspirin every day to lower your risk. Only take daily aspirin if your doctor recommends it because it's not right for everybody. · Beta-blocker medicines. These are a type of blood pressure and heart medicine.  They can reduce the chance of early death if you have had a heart attack. All medicines can cause side effects. So it is important to understand the pros and cons of any medicine you take. It is also important to take your medicines exactly as your doctor tells you to. Follow-up care is a key part of your treatment and safety. Be sure to make and go to all appointments, and call your doctor if you are having problems. It's also a good idea to know your test results and keep a list of the medicines you take. ACE inhibitors  ACE (angiotensin-converting enzyme) inhibitors are used for three main reasons. They lower blood pressure, protect the kidneys, and prevent heart attacks and strokes. Examples include benazepril (Lotensin), lisinopril (Prinivil, Zestril), and ramipril (Altace). An angiotensin II receptor blocker (ARB) may be used instead of an ACE inhibitor. ARBs help you in the same ways as ACE inhibitors. Examples include candesartan (Atacand), irbesartan (Avapro), and losartan (Cozaar). Before you start taking an ACE inhibitor or an ARB, make sure your doctor knows if:  · You are taking a water pill (diuretic). · You are taking potassium pills or using salt substitutes. · You are pregnant or breastfeeding. · You have had a kidney transplant or other kidney problems. ACE inhibitors and ARBs can cause side effects. Call your doctor right away if you have:  · Trouble breathing. · Swelling in your face, head, neck, or tongue. · Dizziness or lightheadedness. · A dry cough. Statins  Statins lower cholesterol. Examples include atorvastatin (Lipitor), lovastatin (Mevacor), pravastatin (Pravachol), and simvastatin (Zocor). Before you start taking a statin, make sure your doctor knows if:  · You have had a kidney transplant or other kidney problems. · You have liver disease. · You take any other prescription medicine, over-the-counter medicine, vitamins, supplements, or herbal remedies. · You are pregnant or breastfeeding.   Statins can cause side effects. Call your doctor right away if you have:  · New, severe muscle aches. · Brown urine. Aspirin  If you are at high risk of a heart attack, your doctor might talk to you about taking an aspirin every day to lower your risk. A heart attack occurs when a blood vessel in the heart gets blocked. When this happens, oxygen can't get to the heart muscle, and part of the heart dies. Aspirin can help prevent blood clots that can block the blood vessels. But talk to your doctor before you start taking aspirin every day. Daily aspirin isn't right for most people. This is because it can cause serious bleeding. You and your doctor can decide if aspirin is a good choice for you based on your risk of a heart attack and your risk of serious bleeding. You may not be able to use aspirin if you:  · Have asthma or certain other health conditions. · Have an ulcer or other stomach problem. · Take some other medicine (called a blood thinner) that prevents blood clots. · Are allergic to aspirin. Your doctor may recommend that you take one low-dose aspirin (81 mg) tablet each day, with a meal and a full glass of water. Before having a surgery or procedure, tell your doctor or dentist that you take aspirin. He or she will tell you if you should stop taking aspirin beforehand. Make sure that you understand exactly what your doctor wants you to do. Aspirin can cause side effects. Call your doctor right away if you have:  · Unusual bleeding or bruising. · Nausea, vomiting, or heartburn. · Black or bloody stools. Beta-blockers  Beta-blockers are used for three main reasons. They lower blood pressure, relieve angina symptoms (such as chest pain or pressure), and reduce the chances of a second heart attack. They include atenolol (Tenormin), carvedilol (Coreg), and metoprolol (Lopressor). Before you start taking a beta-blocker, make sure your doctor knows if you have:  · Severe asthma or frequent asthma attacks.   · A very slow pulse (less than 55 beats a minute). Beta-blockers can cause side effects. Call your doctor right away if you have:  · Wheezing or trouble breathing. · Dizziness or lightheadedness. · Asthma that gets worse. When should you call for help? Watch closely for changes in your health, and be sure to contact your doctor if you have any problems. Where can you learn more? Go to http://hattie-dino.info/. Enter R428 in the search box to learn more about \"Reducing Heart Attack Risk With Daily Medicine: Care Instructions. \"  Current as of: April 9, 2019  Content Version: 12.2  © 9435-2249 [employer]. Care instructions adapted under license by [employer] (which disclaims liability or warranty for this information). If you have questions about a medical condition or this instruction, always ask your healthcare professional. [address] any warranty or liability for your use of this information. Heart-Healthy Diet: Care Instructions  Your Care Instructions    A heart-healthy diet has lots of vegetables, fruits, nuts, beans, and whole grains, and is low in salt. It limits foods that are high in saturated fat, such as meats, cheeses, and fried foods. It may be hard to change your diet, but even small changes can lower your risk of heart attack and heart disease. Follow-up care is a key part of your treatment and safety. Be sure to make and go to all appointments, and call your doctor if you are having problems. It's also a good idea to know your test results and keep a list of the medicines you take. How can you care for yourself at home? Watch your portions  · Learn what a serving is. A \"serving\" and a \"portion\" are not always the same thing. Make sure that you are not eating larger portions than are recommended. For example, a serving of pasta is ½ cup. A serving size of meat is 2 to 3 ounces. A 3-ounce serving is about the size of a deck of cards. Measure serving sizes until you are good at [place]" them. Keep in mind that restaurants often serve portions that are 2 or 3 times the size of one serving. · To keep your energy level up and keep you from feeling hungry, eat often but in smaller portions. · Eat only the number of calories you need to stay at a healthy weight. If you need to lose weight, eat fewer calories than your body burns (through exercise and other physical activity). Eat more fruits and vegetables  · Eat a variety of fruit and vegetables every day. Dark green, deep orange, red, or yellow fruits and vegetables are especially good for you. Examples include spinach, carrots, peaches, and berries. · Keep carrots, celery, and other veggies handy for snacks. Buy fruit that is in season and store it where you can see it so that you will be tempted to eat it. · Cook dishes that have a lot of veggies in them, such as stir-fries and soups. Limit saturated and trans fat  · Read food labels, and try to avoid saturated and trans fats. They increase your risk of heart disease. Trans fat is found in many processed foods such as cookies and crackers. · Use olive or canola oil when you cook. Try cholesterol-lowering spreads, such as Benecol or Take Control. · Bake, broil, grill, or steam foods instead of frying them. · Choose lean meats instead of high-fat meats such as hot dogs and sausages. Cut off all visible fat when you prepare meat. · Eat fish, skinless poultry, and meat alternatives such as soy products instead of high-fat meats. Soy products, such as tofu, may be especially good for your heart. · Choose low-fat or fat-free milk and dairy products. Eat fish  · Eat at least two servings of fish a week. Certain fish, such as salmon and tuna, contain omega-3 fatty acids, which may help reduce your risk of heart attack. Eat foods high in fiber  · Eat a variety of grain products every day.  Include whole-grain foods that have lots of fiber and nutrients. Examples of whole-grain foods include oats, whole wheat bread, and brown rice. · Buy whole-grain breads and cereals, instead of white bread or pastries. Limit salt and sodium  · Limit how much salt and sodium you eat to help lower your blood pressure. · Taste food before you salt it. Add only a little salt when you think you need it. With time, your taste buds will adjust to less salt. · Eat fewer snack items, fast foods, and other high-salt, processed foods. Check food labels for the amount of sodium in packaged foods. · Choose low-sodium versions of canned goods (such as soups, vegetables, and beans). Limit sugar  · Limit drinks and foods with added sugar. These include candy, desserts, and soda pop. Limit alcohol  · Limit alcohol to no more than 2 drinks a day for men and 1 drink a day for women. Too much alcohol can cause health problems. When should you call for help? Watch closely for changes in your health, and be sure to contact your doctor if:    · You would like help planning heart-healthy meals. Where can you learn more? Go to http://hattie-dino.info/. Enter V137 in the search box to learn more about \"Heart-Healthy Diet: Care Instructions. \"  Current as of: April 9, 2019  Content Version: 12.2  © 3051-2474 [employer]. Care instructions adapted under license by [employer] (which disclaims liability or warranty for this information). If you have questions about a medical condition or this instruction, always ask your healthcare professional. [place] [address] any warranty or liability for your use of this information.

## 2019-09-27 NOTE — PROGRESS NOTES
Problem: Mobility Impaired (Adult and Pediatric)  Goal: *Acute Goals and Plan of Care (Insert Text)  Description  LTG:  (1.)Ms. Carl Ace will move from supine to sit and sit to supine , scoot up and down and roll side to side with INDEPENDENT within 7 treatment day(s) from flat surface without handrail. (2.)Ms. Carl Ace will transfer from bed to chair and chair to bed with INDEPENDENT using the least restrictive device within 7 treatment day(s). (3.)Ms. Carl Ace will ambulate with MODIFIED INDEPENDENCE for 500+ feet with the least restrictive device within 7 treatment day(s) while maintaining normal vital signs. _____________________________________________________________________________________________   Outcome: Progressing Towards Goal     PHYSICAL THERAPY: Daily Note and AM 9/27/2019  INPATIENT: PT Visit Days : 4  Payor: Shanna Ellison / Plan: [address] [place] [address] HMO / Product Type: [employer] /       NAME/AGE/GENDER: Gene Linares is a 75 y.o. female   PRIMARY DIAGNOSIS: Abnormal nuclear stress test [R94.39]  CAD (coronary artery disease) [I25.10] S/P CABG x 3   S/P CABG x 3    Procedure(s) (LRB):  CORONARY ARTERY BYPASS GRAFT (CABG X 3)/ LIMA (N/A)  VEIN HARVEST/ GREATER SAPHENOUS (Left)  ESOPHAGEAL TRANS ECHOCARDIOGRAM (N/A)  4 Days Post-Op  ICD-10: Treatment Diagnosis:    · Difficulty in walking, Not elsewhere classified (R26.2)   Precaution/Allergies:  Aspirin; Codeine; Keflex [cephalexin]; Pcn [penicillins]; and Sulfa dyne      ASSESSMENT:     Ms. Carl Ace presents with decreased bed mobility, transfers, ambulation, balance, activity tolerance, strength and overall general functional mobility s/p hospital admission with CABG x 3 on 9/23/19. Pt states independent at baseline, uses cane at times on unlevel surfaces but mostly independent with ambulation. Pt drives, reports some falls at times but none in past 6 months. Pt lives with family.      Pt sitting up in chair on contact with grandson present. SBA for sit to stand. Pt donned milagrose with assistance and balance appeared good. She ambulated with HHA x 2 60', slowly with flexed posture. She stopped and rested then 60' back to room . She sat and rested a minute then performed bilateral LE ex as listed below. She asked to go to the BR. She was assisted to [facility] and told to call for help when she was finished. She was told that for now she needs to continue with the RW until she gets a little stronger. Increased amb speed some. Left with needs in reach and family in room. This section established at most recent assessment   PROBLEM LIST (Impairments causing functional limitations):  1. Decreased ADL/Functional Activities  2. Decreased Transfer Abilities  3. Decreased Ambulation Ability/Technique  4. Decreased Balance  5. Increased Pain  6. Decreased Activity Tolerance  7. Increased Fatigue  8. Decreased [place] with Home Exercise Program   INTERVENTIONS PLANNED: (Benefits and precautions of physical therapy have been discussed with the patient.)  1. Balance Exercise  2. Bed Mobility  3. Family Education  4. Gait Training  5. Home Exercise Program (HEP)  6. Therapeutic Activites  7. Therapeutic Exercise/Strengthening  8. Transfer Training     TREATMENT PLAN: Frequency/Duration: twice daily for duration of hospital stay  Rehabilitation Potential For Stated Goals: Good     REHAB RECOMMENDATIONS (at time of discharge pending progress):    Placement: It is my opinion, based on this patient's performance to date, that Ms. Lizandro Bustillos may benefit from participating in 1-2 additional therapy sessions in order to continue to assess for rehab potential and then make recommendation for disposition at discharge.   Equipment:    None at this time              HISTORY:   History of Present Injury/Illness (Reason for Referral):  S/p CABG x 3, generalized weakness  Past Medical History/Comorbidities:   Ms. Lizandro Bustillos  has a past medical history of Anxiety and depression, Arthritis, CAD (coronary artery disease) (9/16/2019), Chest discomfort, Chronic back pain, COPD (chronic obstructive pulmonary disease) ([facility] DM2 (diabetes mellitus, type 2) ([facility] (6/21/2012), Frequent UTI, GERD (gastroesophageal reflux disease), H/O seasonal allergies, Heart murmur, Hiatal hernia, History of kidney stones, HLD (hyperlipidemia) (6/21/2012), HTN (hypertension) (6/21/2012), Hypothyroidism, Psychiatric disorder, and Varicose veins (6/21/2012). She also has no past medical history of Arrhythmia, Asthma, Autoimmune disease ([address] Cancer ([address] Chronic kidney disease, Difficult intubation, Heart failure ([address] Liver disease, Malignant hyperthermia due to anesthesia, Nausea & vomiting, Pseudocholinesterase deficiency, PUD (peptic ulcer disease), Seizures ([address] Sleep apnea, Stroke ([address] or Thromboembolus ([address] Ms. Gely Keys  has a past surgical history that includes hx appendectomy (1976); hx cholecystectomy (1997); hx hernia repair (1997); hx tubal ligation (1976); hx hysterectomy (1982); hx gyn (1997); hx urological (1997); hx heart catheterization (2007); hx cyst removal (1973); hx orthopaedic (1985); hx cataract removal (2002 and 2006); hx mohs procedure; endoscopy, colon, diagnostic (2011); and hx breast biopsy. Social History/Living Environment:   Home Environment: Trailer/mobile home  # Steps to Enter: 0  Wheelchair Ramp: Yes  One/Two Story Residence: One story  Living Alone: No  Support Systems: Child(jose)  Patient Expects to be Discharged to[de-identified] Private residence  Current DME Used/Available at Home: [place], straight, Walker, rollator, Walker, rolling, Raised toilet seat  Tub or Shower Type: Shower  Prior Level of Function/Work/Activity:  Independent at baseline to [employer]  Personal Factors:          Sex:  female        Age:  75 y.o.         Overall Behavior:  agreeable, drowsy    Number of Personal Factors/Comorbidities that affect the Plan of Care:  Age, COPD, DM, CAD 3+: HIGH COMPLEXITY EXAMINATION:   Most Recent Physical Functioning:   Gross Assessment:                  Posture:     Balance:  Sitting: Intact  Standing: Impaired  Standing - Static: Fair  Standing - Dynamic : Fair Bed Mobility:     Wheelchair Mobility:     Transfers:  Sit to Stand: Contact guard assistance  Stand to Sit: Contact guard assistance  Gait:     Base of Support: Narrowed  Speed/Stephania: Shuffled; Slow  Step Length: Left shortened;Right shortened  Gait Abnormalities: Decreased step clearance  Distance (ft): 60 Feet (ft)(x 2)  Assistive Device: (HHA x 2- needs to use RW for balance)  Ambulation - Level of Assistance: Contact guard assistance(HHA x 2)  Interventions: Safety awareness training;Verbal cues      Body Structures Involved:  1. Heart  2. Lungs  3. Muscles Body Functions Affected:  1. Cardio  2. Respiratory  3. Movement Related Activities and Participation Affected:  1. General Tasks and Demands  2. Mobility  3. Self Care   Number of elements that affect the Plan of Care: 4+: HIGH COMPLEXITY   CLINICAL PRESENTATION:   Presentation: Evolving clinical presentation with changing clinical characteristics: MODERATE COMPLEXITY   CLINICAL DECISION MAKIN [facility] Inpatient Short Form  How much difficulty does the patient currently have. .. Unable A Lot A Little None   1. Turning over in bed (including adjusting bedclothes, sheets and blankets)? ? 1   ? 2   ? 3   ? 4   2. Sitting down on and standing up from a chair with arms ( e.g., wheelchair, bedside commode, etc.)   ? 1   ? 2   ? 3   ? 4   3. Moving from lying on back to sitting on the side of the bed?   ? 1   ? 2   ? 3   ? 4   How much help from another person does the patient currently need. .. Total A Lot A Little None   4. Moving to and from a bed to a chair (including a wheelchair)? ? 1   ? 2   ? 3   ? 4   5. Need to walk in hospital room? ? 1   ? 2   ? 3   ? 4   6. Climbing 3-5 steps with a railing?    ? 1 ? 2   ? 3   ? 4   © 2007, Trustees of [address] [place] [address] [address] [id number], under license to [employer]. All rights reserved      Score:  Initial: 15 Most Recent: X (Date: -- )    Interpretation of Tool:  Represents activities that are increasingly more difficult (i.e. Bed mobility, Transfers, Gait). Medical Necessity:     · Patient is expected to demonstrate progress in strength, range of motion, balance and coordination  ·  to decrease assistance required with overall functional mobility, transfers, ambulation   · .  · Patient demonstrates good  ·  rehab potential due to higher previous functional level. Reason for Services/Other Comments:  · Patient continues to require present interventions due to patient's inability to perform bed mobility, transfers, ambulation safely and effectively   · . Use of outcome tool(s) and clinical judgement create a POC that gives a: Clear prediction of patient's progress: LOW COMPLEXITY            TREATMENT:   (In addition to Assessment/Re-Assessment sessions the following treatments were rendered)   Pre-treatment Symptoms/Complaints:  \"I feel some better\". Pain: Initial: 0     Post Session:   appears comfortable in chair     Therapeutic Activity: (    23 min): Therapeutic activities including Chair , Ambulation on level ground and walker safety, posture, LE ex  to improve mobility, strength, balance and coordination. Required minimal Safety awareness training;Verbal cues to promote static and dynamic balance in standing and promote coordination of bilateral, lower extremity(s).       Date:  9/25/19 Date:  9/26/19 BID Date:  9/27/19   Activity/Exercise Seated Parameters Parameters Parameters   Heel raises X 15 B X 20 B X 20 B   Toe raises X 15 B X 20 B X 20 B   LAQ's X 15 B X 10 B X 20 B   Hip Flex X 15 B X 10 B X 20 B   Hip ABD X 15 B X 10 B X 20 B                         Braces/Orthotics/Lines/Etc:   · room air  Treatment/Session Assessment:    · Response to Treatment:  tolerated well  · Interdisciplinary Collaboration:   o Physical Therapy Assistant  o Registered Nurse  · After treatment position/precautions:   o Up in chair  o Bed/Chair-wheels locked  o Caregiver at bedside  o Call light within reach  o RN notified  o Family at bedside   · Compliance with Program/Exercises: Compliant most of the time  · Recommendations/Intent for next treatment session: \"Next visit will focus on advancements to more challenging activities\".   Total Treatment Duration:  PT Patient Time In/Time Out  Time In: 1110  Time Out: Debbi Caldera, PTA

## 2019-09-27 NOTE — PROGRESS NOTES
Discharge instructions, follow up appointments and prescriptions reviewed with patient and family. Both verbalize understanding. Pt transported to [facility] via [facility]  All personal belongings taken with patient. Patient escorted to discharge area via wheelchair. Patient is stable at discharge. Rx given for Tramadol. Report given to [facility] at [facility].

## 2019-09-27 NOTE — DISCHARGE SUMMARY
Discharge Summary     Patient ID:  Larissa Styles  [id number]  3 y.o.  1945    Admit date: 9/16/2019    Discharge date:  9/27/2019    Admitting Physician: Marcelo Fuentes MD     Discharge Physician: RENATE Shin/Dr. Beryle Bee    Admission Diagnoses: Abnormal nuclear stress test [R94.39]  CAD (coronary artery disease) [I25.10]    Discharge Diagnoses:   Patient Active Problem List    Diagnosis Date Noted    CAD (coronary artery disease) 09/16/2019     Priority: 1 - One    S/P CABG x 3 09/25/2019    [facility] 09/23/2019    Atelectasis 09/23/2019    Hypoxia 09/23/2019    Chest discomfort 09/09/2019    DM2 (diabetes mellitus, type 2) ([facility] 06/21/2012    HLD (hyperlipidemia) 06/21/2012    Varicose veins 06/21/2012    HTN (hypertension) 06/21/2012       Procedures this admission:  Diagnostic left heart catheterization  EchoCardiogram  Coronary Artery Bypass Graft Surgery   Consults: Cardiac Surgery, Hospitalists, Pulmonary/Intensive Care    Hospital Course: The patient is a 75 y.o. female who was seen in the office by Dr. Katey Dawkins for complaints of SOMMERS. Stress test showed small reversible anteroapical ischemia. LHC was planned. She underwent cardiac catheterization that showed severe multivessel disease. She stated she had noted worsening bilateral leg pain with exertion and had occasional chest tightness while doing housework and light activity. She intermittently has to use a cane or walker now when her ankle hurts or \"gives out. \"   She reported a TIA approximately 30 years ago and stated she had facial droop with this. She denied any recurrent neurological symptoms. CABG surgery was planned after Plavix washout. Pt stated her glucometers at home frequently read \"high. \" She assumed they were not working. She was informed that this typically means that her BGL is over a certain limit.  She also stated she had a syncopal episode over the weekend and regained consciousness after family gave her peppermint candy. Hospitalist service was consulted for uncontrolled DM. Hgb A1C was 11.9. They followed and titrated insulin pre-op. She underwent CABG x 3 with LIMA to LAD, reverse SVG to OM1, and reverse SVG to OM2 on 9/23/19. She did well post operatively and was transferred to [facility] on POD 1. She had nausea and a poor appetite. She improved with a scopolamine patch. She was weaned off of O2. She remained in sinus rhythm. STR was recommended and bed was arranged. Appetite improved. The morning of 9/27/19, patient was feeling well and ambulating without any symptoms. Patient was determined stable and ready for discharge. Patient was instructed on the importance of medication compliance and outpatient follow up. For maximized medical therapy for CAD, patient will continue Plavix, BB, ACE-I, and statin as well. She is not on aspirin due to allergy. *She tolerated low dose ACE-I. Norvasc was not resumed to borderline low BP. The patient will have close transitional care follow up with [address] [place] [address] [address] Dr. Katey Dawkins on October 4th at 10:15am [facility] AT [place]). The patient will follow up with Dr. Beryle Bee on October 16th at 2:10pm and has been referred to cardiac rehab. *She is discharged on Tresiba (or Lantus as substituted at rehab facility) as well as pre-meal insulin. Actos was stopped. She will follow up with PCP for further management of DM. She received diabetes education during admission. DISPOSITION: The patient is being discharged to rehab facility in stable condition on a low saturated fat, low cholesterol and low salt diet. The patient is instructed to advance activities as tolerated to the limit of fatigue or shortness of breath. The patient is instructed to avoid all heavy lifting or activities that strain the chest or upper arm muscles. Strenuous activity should be avoided.  The patient is instructed to watch for signs of infection which include: increasing area of redness, fever or purulent drainage at the incision site. The patient is instructed to call the office or return to the ER for immediate evaluation for any shortness of breath or chest pain not relieved by NTG. Discharge Exam:   Visit Vitals  /59   Pulse 73   Temp 97.1 °F (36.2 °C)   Resp 17   Ht 5' 2\" (1.575 m)   Wt 162 lb 14.4 oz (73.9 kg)   SpO2 95%   Breastfeeding?  No   BMI 29.79 kg/m²         Physical Exam:  General: Well Developed, Well Nourished, No Acute Distress, Alert & Oriented  Neck: supple, no JVD  Heart: S1S2 with RRR without murmurs or gallops  Lungs: Clear throughout auscultation bilaterally without adventitious sounds  Abd: soft, nontender, nondistended, with good bowel sounds  Ext: warm, no edema  Sternal incision: clean, dry, and intact  Skin: warm and dry      Recent Results (from the past 24 hour(s))   GLUCOSE, POC    Collection Time: 09/26/19  5:04 PM   Result Value Ref Range    Glucose (POC) 107 (H) 65 - 100 mg/dL   GLUCOSE, POC    Collection Time: 09/26/19  9:49 PM   Result Value Ref Range    Glucose (POC) 134 (H) 65 - 100 mg/dL   PLEASE READ & DOCUMENT PPD TEST IN 72 HRS    Collection Time: 09/26/19 10:08 PM   Result Value Ref Range    PPD Negative Negative    mm Induration 0 0 - 5 mm   CBC W/O DIFF    Collection Time: 09/27/19  3:43 AM   Result Value Ref Range    WBC 7.6 4.3 - 11.1 K/uL    RBC 2.82 (L) 4.05 - 5.2 M/uL    HGB 9.4 (L) 11.7 - 15.4 g/dL    HCT 29.7 (L) 35.8 - 46.3 %    .3 (H) 79.6 - 97.8 FL    MCH 33.3 (H) 26.1 - 32.9 PG    MCHC 31.6 31.4 - 35.0 g/dL    RDW 13.0 11.9 - 14.6 %    PLATELET 006 200 - 058 K/uL    MPV 10.8 9.4 - 12.3 FL    ABSOLUTE NRBC 0.00 0.0 - 0.2 K/uL   MAGNESIUM    Collection Time: 09/27/19  3:43 AM   Result Value Ref Range    Magnesium 1.9 1.8 - 2.4 mg/dL   POTASSIUM    Collection Time: 09/27/19  3:43 AM   Result Value Ref Range    Potassium 3.9 3.5 - 5.1 mmol/L   GLUCOSE, POC    Collection Time: 09/27/19  6:13 AM   Result Value Ref Range    Glucose (POC) 122 (H) 65 - 100 mg/dL         Patient Instructions:   Current Discharge Medication List      START taking these medications    Details   scopolamine (TRANSDERM-SCOP) 1 mg over 3 days pt3d 1 Patch by TransDERmal route every seventy-two (72) hours for 2 doses. Qty: 2 Patch, Refills: 0      insulin lispro (HUMALOG) 100 unit/mL injection 7 Units by SubCUTAneous route Before breakfast, lunch, and dinner. Qty: 1 Vial, Refills: 0      nitroglycerin (NITROSTAT) 0.4 mg SL tablet 1 Tab by SubLINGual route every five (5) minutes as needed (chest pain). Up to 3 doses. Qty: 2 Bottle, Refills: 4      lidocaine (XYLOCAINE) 5 % ointment Apply  to affected area as needed for Pain (neck/shoulder pain). Qty: 1 Tube, Refills: 0      lisinopril (PRINIVIL, ZESTRIL) 2.5 mg tablet Take 1 Tab by mouth daily. Qty: 30 Tab, Refills: 3      traMADol (ULTRAM) 50 mg tablet Take 1 Tab by mouth every six (6) hours as needed for Pain for up to 30 days. Max Daily Amount: 200 mg. Qty: 30 Tab, Refills: 0    Associated Diagnoses: S/P CABG x 3         CONTINUE these medications which have CHANGED    Details   acetaminophen (TYLENOL) 325 mg tablet Take 2 Tabs by mouth every four (4) hours as needed for Pain. atorvastatin (LIPITOR) 80 mg tablet Take 1 Tab by mouth daily. Qty: 90 Tab, Refills: 3         CONTINUE these medications which have NOT CHANGED    Details   metoprolol succinate (TOPROL XL) 25 mg XL tablet Take 1 Tab by mouth daily. Qty: 30 Tab, Refills: 11      clopidogrel (PLAVIX) 75 mg tab Take 1 Tab by mouth daily. Qty: 30 Tab, Refills: 5      omeprazole (PRILOSEC) 40 mg capsule Take 1 Cap by mouth daily.   Qty: 30 Cap, Refills: 5      insulin degludec (TRESIBA FLEXTOUCH U-100) 100 unit/mL (3 mL) inpn Use 26 units QHS  Qty: 5 Pen, Refills: 5      metFORMIN (GLUCOPHAGE) 500 mg tablet TAKE TWO TABLETS BY MOUTH TWICE DAILY WITH MEALS  Qty: 360 Tab, Refills: 3      montelukast (SINGULAIR) 10 mg tablet Take 1 Tab by mouth daily. Qty: 30 Tab, Refills: 5      ferrous sulfate 325 mg (65 mg iron) tablet Take 65 mg by mouth Daily (before breakfast). biotin 1 mg tab Take  by mouth daily. MULTIVITAMIN (MULTIPLE VITAMIN PO) Take 2 Tabs by mouth daily. diphenhydrAMINE (BENADRYL) 25 mg tablet Take 50 mg by mouth two (2) times a day. cyclobenzaprine (FLEXERIL) 5 mg tablet Take 2 Tabs by mouth three (3) times daily as needed for Muscle Spasm(s).   Qty: 20 Tab, Refills: 0         STOP taking these medications       amLODIPine (NORVASC) 2.5 mg tablet Comments:   Reason for Stopping:         pioglitazone (ACTOS) 30 mg tablet Comments:   Reason for Stopping:               Signed:  Josh Jung PA-C  9/27/2019  11:38 AM

## 2019-09-27 NOTE — PROGRESS NOTES
[facility] PROGRESS NOTE           9/27/2019 7:21 AM    Admit Date: 9/16/2019      Subjective:   Patient POD#4 after CABG. Notes sternotomy pain well controlled. BP controlled. In sinus rhythm. On room air. Nausea improved. Up and ambulating with assistance. ROS:  Cardiovascular:  As noted above    Objective:      Vitals:    09/26/19 2207 09/27/19 0141 09/27/19 0323 09/27/19 0716   BP: 142/65  110/53 121/59   Pulse: 69  67 73   Resp: 18  18 17   Temp: 98.1 °F (36.7 °C)  98.1 °F (36.7 °C) 97.1 °F (36.2 °C)   SpO2: 97% 95% 95% 95%   Weight:   73.9 kg (162 lb 14.4 oz)    Height:           Physical Exam:  General-No Acute Distress  Neck- supple, no JVD  CV- regular rate and rhythm no MRG  Lung- clear bilaterally  Abd- soft, nontender, nondistended  Ext- No edema bilaterally. PALMER hose in place. Skin- warm and dry      Data Review:   Recent Labs     09/27/19  0343 09/26/19  0346 09/25/19  0623   NA  --   --  136   K 3.9 4.0 4.3   MG 1.9 1.7* 1.8   BUN  --   --  12   CREA  --   --  0.45*   GLU  --   --  177*   WBC 7.6  --  11.7*   HGB 9.4*  --  9.8*   HCT 29.7*  --  30.5*     --  166      No results found for: MELODY Fishman TNIPOC    Echo (9/17/19):  -  Left ventricle: Systolic function was normal. Ejection fraction was estimated in the range of 60 % to 65 %. There were no regional wall motion  abnormalities. Left ventricular diastolic function parameters were abnormal.  -  Right ventricle: There was mild pulmonary artery hypertension.  -  Aortic valve: The valve was trileaflet. Leaflets exhibited mild sclerosis. -  Mitral valve: There was mild regurgitation. -  Tricuspid valve: There was mild to moderate regurgitation. Assessment/Plan:     Active Problems:    CAD (coronary artery disease) (9/16/2019)    Multi-vessel CAD at cath. POD #4 after CABG. Normal pre-op EF. Allergic to Aspirin. Now on Plavix. Amiodarone stopped due to nausea but no arrhythmias.   Start back home Toprol. DM2 (diabetes mellitus, type 2) ([facility] (6/21/2012)    On insulin. Monitor glucose. HLD (hyperlipidemia) (6/21/2012)    ON Lipitor. HTN (hypertension) (6/21/2012)    BP controlled. Hypoxia (9/23/2019)    Incentive spirometry. Now on room air. Nausea  Improved. Amiodarone stopped.                   Rogelio Claros MD  9/27/2019 7:21 AM

## 2019-09-27 NOTE — PROGRESS NOTES
Pt approved by insurance this day to transition to SNF for short term rehab. Pt to transition to [facility] this day. Spoke with pt and her son regarding discharge to SNF this day - both remain agreeable. This CM attempted to speak with daughter, Tanner Vincent, but she does not have  set up. Transport arranged via [facility] for 2pm.  Pt to go to [place] [address]. No additional discharge needs at this time. Milestones met.      Care Management Interventions  PCP Verified by CM: Yes(Kinjal Girard)  Mode of Transport at Discharge: BLS([facility])  Transition of Care Consult (CM Consult): Discharge Planning, SNF  Discharge Durable Medical Equipment: No  Physical Therapy Consult: Yes  Occupational Therapy Consult: Yes  Speech Therapy Consult: No  Current Support Network: Own Home, Other(Pt lives with her daughter)  Confirm Follow Up Transport: Other (see comment)([facility])  Plan discussed with Pt/Family/Caregiver: Yes  Freedom of Choice Offered: Yes  Discharge Location  Discharge Placement: Skilled nursing facility

## 2019-09-27 NOTE — PROGRESS NOTES
Progress Note    Patient: Araseli Dill MRN: [id number]  SSN: [id number]    YOB: 1945  Age: 75 y.o. Sex: female      Admit Date: 9/16/2019    LOS: 11 days     Subjective:   F/U CABG, DM type II    74 yo PMH of CAD, COPD, DM II, HTN, hypothyroidism, hyperlipidemia S/p CABG on 9/23. Extubated on 9/23. We are consulted for DM management. No chest pain or SOB. BM today. Glucose levels controlled. No episodes of hypoglycemia. Having chronic left shoulder pain.    Current Facility-Administered Medications   Medication Dose Route Frequency    metoprolol succinate (TOPROL-XL) XL tablet 25 mg  25 mg Oral DAILY    bisacodyl (DULCOLAX) suppository 10 mg  10 mg Rectal DAILY PRN    insulin glargine (LANTUS) injection 20 Units  20 Units SubCUTAneous QHS    insulin lispro (HUMALOG) injection 7 Units  7 Units SubCUTAneous TIDAC    clopidogrel (PLAVIX) tablet 75 mg  75 mg Oral DAILY    insulin lispro (HUMALOG) injection 0-10 Units  0-10 Units SubCUTAneous AC&HS    scopolamine (TRANSDERM-SCOP) 1 mg over 3 days 1 Patch  1 Patch TransDERmal Q72H    ferrous sulfate tablet 325 mg  325 mg Oral ACB    metFORMIN (GLUCOPHAGE) tablet 1,000 mg  1,000 mg Oral BID WITH MEALS    sodium chloride (NS) flush 5-40 mL  5-40 mL IntraVENous Q8H    sodium chloride (NS) flush 5-40 mL  5-40 mL IntraVENous PRN    furosemide (LASIX) tablet 40 mg  40 mg Oral DAILY    alum-mag hydroxide-simeth (MYLANTA) oral suspension 30 mL  30 mL Oral Q4H PRN    famotidine (PEPCID) tablet 20 mg  20 mg Oral Q12H    ondansetron (ZOFRAN) injection 4 mg  4 mg IntraVENous Q6H PRN    acetaminophen (TYLENOL) tablet 650 mg  650 mg Oral Q4H PRN    atorvastatin (LIPITOR) tablet 80 mg  80 mg Oral QHS    magnesium oxide (MAG-OX) tablet 400 mg  400 mg Oral TID PRN    magnesium oxide (MAG-OX) tablet 400 mg  400 mg Oral QID PRN    potassium chloride (KLOR-CON) tablet 10 mEq  10 mEq Oral DAILY    potassium chloride (K-DUR, KLOR-CON) SR tablet 20 mEq  20 mEq Oral BID PRN    potassium chloride (K-DUR, KLOR-CON) SR tablet 40 mEq  40 mEq Oral BID PRN    lisinopril (PRINIVIL, ZESTRIL) tablet 2.5 mg  2.5 mg Oral DAILY    nitroglycerin (NITROSTAT) tablet 0.4 mg  0.4 mg SubLINGual Q5MIN PRN    traMADol (ULTRAM) tablet 50 mg  50 mg Oral Q6H PRN    influenza vaccine 2019-20 (6 mos+)(PF) (FLUARIX/FLULAVAL/FLUZONE QUAD) injection 0.5 mL  0.5 mL IntraMUSCular PRIOR TO DISCHARGE    senna-docusate (PERICOLACE) 8.6-50 mg per tablet 2 Tab  2 Tab Oral BID    oxyCODONE-acetaminophen (PERCOCET) 5-325 mg per tablet 1 Tab  1 Tab Oral Q4H PRN    cyclobenzaprine (FLEXERIL) tablet 10 mg  10 mg Oral TID PRN    [Held by provider] pioglitazone (ACTOS) tablet 30 mg  30 mg Oral DAILY       Objective:     Vitals:    09/26/19 2207 09/27/19 0141 09/27/19 0323 09/27/19 0716   BP: 142/65  110/53 121/59   Pulse: 69  67 73   Resp: 18  18 17   Temp: 98.1 °F (36.7 °C)  98.1 °F (36.7 °C) 97.1 °F (36.2 °C)   SpO2: 97% 95% 95% 95%   Weight:   73.9 kg (162 lb 14.4 oz)    Height:             Intake and Output:  Current Shift: 09/27 0701 - 09/27 1900  In: 240 [P.O.:240]  Out: -   Last three shifts: 09/25 1901 - 09/27 0700  In: 1200 [P.O.:1200]  Out: 4250 [Urine:4250]    Physical Exam:   General:  Alert, cooperative, no distress, appears stated age. Eyes:  Conjunctivae/corneas clear. Ears:  Normal TMs and external ear canals both ears. Nose: Nares normal. Septum midline. Mouth/Throat: Lips, mucosa, and tongue normal.    Neck:  no JVD. Back:   deferred. Lungs:   Clear to auscultation bilaterally. Heart:  Regular rate and rhythm, S1, S2 normal   Abdomen:   Soft, non-tender. Bowel sounds normal.    Extremities: Left shoulder with tenderness to joint line. No edema noted. Pulses: 2+ and symmetric all extremities. Skin: Anterior chest wall with bandage.  No obvious signs of bleeding   Lymph nodes: Cervical, supraclavicular, and axillary nodes normal.   Neurologic: CNII-XII intact. Lab/Data Review:    Recent Results (from the past 24 hour(s))   GLUCOSE, POC    Collection Time: 09/26/19 11:26 AM   Result Value Ref Range    Glucose (POC) 242 (H) 65 - 100 mg/dL   GLUCOSE, POC    Collection Time: 09/26/19  5:04 PM   Result Value Ref Range    Glucose (POC) 107 (H) 65 - 100 mg/dL   GLUCOSE, POC    Collection Time: 09/26/19  9:49 PM   Result Value Ref Range    Glucose (POC) 134 (H) 65 - 100 mg/dL   PLEASE READ & DOCUMENT PPD TEST IN 72 HRS    Collection Time: 09/26/19 10:08 PM   Result Value Ref Range    PPD Negative Negative    mm Induration 0 0 - 5 mm   CBC W/O DIFF    Collection Time: 09/27/19  3:43 AM   Result Value Ref Range    WBC 7.6 4.3 - 11.1 K/uL    RBC 2.82 (L) 4.05 - 5.2 M/uL    HGB 9.4 (L) 11.7 - 15.4 g/dL    HCT 29.7 (L) 35.8 - 46.3 %    .3 (H) 79.6 - 97.8 FL    MCH 33.3 (H) 26.1 - 32.9 PG    MCHC 31.6 31.4 - 35.0 g/dL    RDW 13.0 11.9 - 14.6 %    PLATELET 316 706 - 047 K/uL    MPV 10.8 9.4 - 12.3 FL    ABSOLUTE NRBC 0.00 0.0 - 0.2 K/uL   MAGNESIUM    Collection Time: 09/27/19  3:43 AM   Result Value Ref Range    Magnesium 1.9 1.8 - 2.4 mg/dL   POTASSIUM    Collection Time: 09/27/19  3:43 AM   Result Value Ref Range    Potassium 3.9 3.5 - 5.1 mmol/L   GLUCOSE, POC    Collection Time: 09/27/19  6:13 AM   Result Value Ref Range    Glucose (POC) 122 (H) 65 - 100 mg/dL       Assessment/ Plan:     Principal Problem:    S/P CABG x 3 (9/25/2019)    Active Problems:    CAD (coronary artery disease) (9/16/2019)      DM2 [facility]di[facility] mellitus[facility] (6/21/2012)      Overview: followed by PCP            HLD (hyperlipidemia) (6/21/2012)      Overview: followed by PCP            HTN (hypertension) (6/21/2012)      Overview: readings in target range      Encounter for weaning from ventilator ([facility] (9/23/2019)      Atelectasis (9/23/2019)      Hypoxia (9/23/2019)    DM type II - Lantus 20 units. SS. Humalog 7 units before meals. Actos has been held.  Metformin started by cardiology     CAD s/p CABG - Cardiology as primary. Amiodarone, amlodipine, statin, ACE, BB, and Plavix. Pepcid BID. Scopolamine every 72 hours. Has senna docusate two tab BID for constipation    Will order lidocaine cream for left shoulder pain.      DVT prophylaxis - SCD  Signed By: Kathy Martinez DO     September 27, 2019

## 2019-10-11 ENCOUNTER — HOME HEALTH ADMISSION (OUTPATIENT)
Dept: HOME HEALTH SERVICES | Facility: HOME HEALTH | Age: 74
End: 2019-10-11
Payer: MEDICARE

## 2019-10-11 ENCOUNTER — PATIENT OUTREACH (OUTPATIENT)
Dept: CASE MANAGEMENT | Age: 74
End: 2019-10-11

## 2019-10-13 ENCOUNTER — HOME CARE VISIT (OUTPATIENT)
Dept: SCHEDULING | Facility: HOME HEALTH | Age: 74
End: 2019-10-13
Payer: MEDICARE

## 2019-10-13 VITALS
OXYGEN SATURATION: 99 % | DIASTOLIC BLOOD PRESSURE: 68 MMHG | HEART RATE: 84 BPM | TEMPERATURE: 97.6 F | SYSTOLIC BLOOD PRESSURE: 122 MMHG | RESPIRATION RATE: 16 BRPM

## 2019-10-13 PROCEDURE — 400013 HH SOC

## 2019-10-13 PROCEDURE — 3331090002 HH PPS REVENUE DEBIT

## 2019-10-13 PROCEDURE — 3331090001 HH PPS REVENUE CREDIT

## 2019-10-13 PROCEDURE — G0299 HHS/HOSPICE OF RN EA 15 MIN: HCPCS

## 2019-10-14 ENCOUNTER — HOME CARE VISIT (OUTPATIENT)
Dept: SCHEDULING | Facility: HOME HEALTH | Age: 74
End: 2019-10-14
Payer: MEDICARE

## 2019-10-14 ENCOUNTER — PATIENT OUTREACH (OUTPATIENT)
Dept: CASE MANAGEMENT | Age: 74
End: 2019-10-14

## 2019-10-14 VITALS
TEMPERATURE: 98.5 F | DIASTOLIC BLOOD PRESSURE: 78 MMHG | SYSTOLIC BLOOD PRESSURE: 128 MMHG | HEART RATE: 78 BPM | RESPIRATION RATE: 18 BRPM

## 2019-10-14 PROCEDURE — 3331090002 HH PPS REVENUE DEBIT

## 2019-10-14 PROCEDURE — G0151 HHCP-SERV OF PT,EA 15 MIN: HCPCS

## 2019-10-14 PROCEDURE — 3331090001 HH PPS REVENUE CREDIT

## 2019-10-14 NOTE — PROGRESS NOTES
Lissy Valencia, Dr Gaby Grimaldo MA, and this RN have tried several times to reach daughter re   Transition of Care Discharge Follow-up Questionnaire   Date/Time of Call:   10/14/19 2719   What was the patient hospitalized for? SFD 9/16-9/27/19, CAD, CABG X 3  Trg [address], [place], 9/27-10/11/19   Does the patient understand his/her diagnosis and/or treatment and what happened during the hospitalization? Daughter verbalizes understanding   Did the patient receive discharge instructions? No questions   CM Assessed Risk for Readmission:       Patient stated Risk for Readmission: Mod due to age, comorbidities, high A1C      None stated   Review any discharge instructions (see discharge instructions/AVS in [address]). Ask patient if they understand these. Do they have any questions? No questions   Were home services ordered (nursing, PT, OT, ST, etc.)? [facility]      If so, has the first visit occurred? If not, why? (Assist with coordination of services if necessary. )   10/13/19   Was any DME ordered? No   If so, has it been received? If not, why?  (Assist patient in obtaining DME orders &/or equipment if necessary. )     NA     Complete a review of all medications (new, continued and discontinued meds per the D/C instructions and [facility] tab [facility] Meds reviewed, pt has no insulin, daughter states they cannot afford. Call to PCP to request samples, message left. Daughter reports blood sugar yesterday 190, not sure about today. Were all new prescriptions filled? If not, why?  (Assist patient in obtaining medications if necessary  escalate for CCM &/or SW if ongoing issues are verbalized by pt or anticipated)        NA     Does the patient understand the purpose and dosing instructions for all medications?   (If patient has questions, provide explanation and education.)   Daughter reports compliance w/ other meds   Does the patient have any problems in performing ADLs? (If patient is unable to perform ADLs  what is the limiting factor(s)? Do they have a support system that can assist? If no support system is present, discuss possible assistance that they may be able to obtain. Escalate for CCM/SW if ongoing issues are verbalized by pt or anticipated)     Daughter assists at times           Does the patient have all follow-up appointments scheduled? 7 day f/up with PCP?   (f/up with PCP may be w/in 14 days if patient has a f/up with their specialist w/in 7 days)    7-14 day f/up with specialist?   (or per discharge instructions)    If f/up has not been made  what actions has the care coordinator made to accomplish this? Has transportation been arranged? 10/4 Cardiology, Dr Maira Castillo - no changes    10/16 CV Surg, Dr Albert Falcon    10/22 PCP, Dr James Powell                  Daughter will transport     Any other questions or concerns expressed by the patient? No   Schedule next appointment with BRADLEY HORTON Coordinator or refer to RN Case Manager/ per the workflow guidelines. When is care coordinators next follow-up call scheduled? If referred for CCM  what RN care manager was the referral assigned? Plan to f/u in 1-2 days about insulin    Agreeable to [employer] Call Completed By: Mio Gandhi RN       Picking up samples. Spoke w/ Dr James Powell and she will order Humalog and Basaglar for pt to get w/ free voucher X 1 mo supply. 10/16 RNCM again tried to reach pt/ daughter, no VM, unable to leave a message. Plan to f/u later in week or next. This note will not be viewable in [address]

## 2019-10-15 PROCEDURE — 3331090002 HH PPS REVENUE DEBIT

## 2019-10-15 PROCEDURE — 3331090001 HH PPS REVENUE CREDIT

## 2019-10-16 ENCOUNTER — HOME CARE VISIT (OUTPATIENT)
Dept: SCHEDULING | Facility: HOME HEALTH | Age: 74
End: 2019-10-16
Payer: MEDICARE

## 2019-10-16 PROCEDURE — 3331090001 HH PPS REVENUE CREDIT

## 2019-10-16 PROCEDURE — G0299 HHS/HOSPICE OF RN EA 15 MIN: HCPCS

## 2019-10-16 PROCEDURE — 3331090002 HH PPS REVENUE DEBIT

## 2019-10-17 ENCOUNTER — HOME CARE VISIT (OUTPATIENT)
Dept: SCHEDULING | Facility: HOME HEALTH | Age: 74
End: 2019-10-17
Payer: MEDICARE

## 2019-10-17 VITALS
HEART RATE: 80 BPM | RESPIRATION RATE: 17 BRPM | TEMPERATURE: 97.9 F | DIASTOLIC BLOOD PRESSURE: 72 MMHG | SYSTOLIC BLOOD PRESSURE: 120 MMHG

## 2019-10-17 PROCEDURE — G0157 HHC PT ASSISTANT EA 15: HCPCS

## 2019-10-17 PROCEDURE — 3331090002 HH PPS REVENUE DEBIT

## 2019-10-17 PROCEDURE — 3331090001 HH PPS REVENUE CREDIT

## 2019-10-18 ENCOUNTER — HOME CARE VISIT (OUTPATIENT)
Dept: SCHEDULING | Facility: HOME HEALTH | Age: 74
End: 2019-10-18
Payer: MEDICARE

## 2019-10-18 VITALS
RESPIRATION RATE: 18 BRPM | OXYGEN SATURATION: 98 % | TEMPERATURE: 97.8 F | HEART RATE: 98 BPM | DIASTOLIC BLOOD PRESSURE: 64 MMHG | SYSTOLIC BLOOD PRESSURE: 126 MMHG

## 2019-10-18 PROCEDURE — 3331090002 HH PPS REVENUE DEBIT

## 2019-10-18 PROCEDURE — G0299 HHS/HOSPICE OF RN EA 15 MIN: HCPCS

## 2019-10-18 PROCEDURE — 3331090001 HH PPS REVENUE CREDIT

## 2019-10-19 PROCEDURE — 3331090001 HH PPS REVENUE CREDIT

## 2019-10-19 PROCEDURE — 3331090002 HH PPS REVENUE DEBIT

## 2019-10-20 PROCEDURE — 3331090001 HH PPS REVENUE CREDIT

## 2019-10-20 PROCEDURE — 3331090002 HH PPS REVENUE DEBIT

## 2019-10-21 ENCOUNTER — PATIENT OUTREACH (OUTPATIENT)
Dept: CASE MANAGEMENT | Age: 74
End: 2019-10-21

## 2019-10-21 ENCOUNTER — HOME CARE VISIT (OUTPATIENT)
Dept: HOME HEALTH SERVICES | Facility: HOME HEALTH | Age: 74
End: 2019-10-21
Payer: MEDICARE

## 2019-10-21 PROCEDURE — 3331090002 HH PPS REVENUE DEBIT

## 2019-10-21 PROCEDURE — 3331090001 HH PPS REVENUE CREDIT

## 2019-10-22 ENCOUNTER — HOME CARE VISIT (OUTPATIENT)
Dept: HOME HEALTH SERVICES | Facility: HOME HEALTH | Age: 74
End: 2019-10-22
Payer: MEDICARE

## 2019-10-22 ENCOUNTER — HOME CARE VISIT (OUTPATIENT)
Dept: SCHEDULING | Facility: HOME HEALTH | Age: 74
End: 2019-10-22
Payer: MEDICARE

## 2019-10-22 VITALS
RESPIRATION RATE: 16 BRPM | SYSTOLIC BLOOD PRESSURE: 105 MMHG | DIASTOLIC BLOOD PRESSURE: 65 MMHG | HEART RATE: 101 BPM | TEMPERATURE: 97.3 F

## 2019-10-22 VITALS
SYSTOLIC BLOOD PRESSURE: 124 MMHG | RESPIRATION RATE: 17 BRPM | HEART RATE: 72 BPM | TEMPERATURE: 97.9 F | DIASTOLIC BLOOD PRESSURE: 70 MMHG

## 2019-10-22 PROCEDURE — G0152 HHCP-SERV OF OT,EA 15 MIN: HCPCS

## 2019-10-22 PROCEDURE — 3331090002 HH PPS REVENUE DEBIT

## 2019-10-22 PROCEDURE — 3331090001 HH PPS REVENUE CREDIT

## 2019-10-22 PROCEDURE — G0157 HHC PT ASSISTANT EA 15: HCPCS

## 2019-10-23 ENCOUNTER — HOME CARE VISIT (OUTPATIENT)
Dept: SCHEDULING | Facility: HOME HEALTH | Age: 74
End: 2019-10-23
Payer: MEDICARE

## 2019-10-23 VITALS
OXYGEN SATURATION: 98 % | RESPIRATION RATE: 18 BRPM | TEMPERATURE: 97.8 F | HEART RATE: 84 BPM | SYSTOLIC BLOOD PRESSURE: 122 MMHG | DIASTOLIC BLOOD PRESSURE: 64 MMHG

## 2019-10-23 PROCEDURE — 3331090001 HH PPS REVENUE CREDIT

## 2019-10-23 PROCEDURE — G0299 HHS/HOSPICE OF RN EA 15 MIN: HCPCS

## 2019-10-23 PROCEDURE — 3331090002 HH PPS REVENUE DEBIT

## 2019-10-23 NOTE — PROGRESS NOTES
Community Care Management  Follow up Outreach Note   Outreach type: Phone call: Yes   Date/Time of Outreach: 10/23/19, 1050     Reason for follow-up:   Continued outreach   Disease specific complaints/issues:   None     Patient progress towards goals set from last contact:   Stable   Has patient attended any PCP or specialist follow-up appointments since last contact? What was outcome of appointment? When is next follow-up scheduled? 10/22 PCP, Dr Bert Cali    10/24 [facility]  10/30 CV Surgery, Dr Ct Packer     Review medications. Any medication changes since last outreach? Does patient have any questions or issues related to their medications? Daughter reports she was able to  insulin samples, blood sugars under good control now, yesterday = 96. Home health active? If yes  any issue? Progress? [facility]   Referrals needed?  (SW, Diabetes education, HH, etc. ) No   Other issues/Miscellaneous? (Transportation, access to meals, ability to perform ADLs, adequate caregiver support, etc.)       Spoke w/ daughter about calling Isaac Lopez re getting help w/ insulin. Contact info given. Agrees to call and then  call this RN back to report. Next Outreach Scheduled: Next week     Next Steps/Goals:   F/U   Community Care Manager: Kavin Mann RN               This note will not be viewable in [address]

## 2019-10-24 ENCOUNTER — HOME CARE VISIT (OUTPATIENT)
Dept: SCHEDULING | Facility: HOME HEALTH | Age: 74
End: 2019-10-24
Payer: MEDICARE

## 2019-10-24 VITALS
RESPIRATION RATE: 16 BRPM | HEART RATE: 70 BPM | SYSTOLIC BLOOD PRESSURE: 142 MMHG | DIASTOLIC BLOOD PRESSURE: 84 MMHG | TEMPERATURE: 98.2 F

## 2019-10-24 PROCEDURE — 3331090002 HH PPS REVENUE DEBIT

## 2019-10-24 PROCEDURE — 3331090001 HH PPS REVENUE CREDIT

## 2019-10-24 PROCEDURE — G0151 HHCP-SERV OF PT,EA 15 MIN: HCPCS

## 2019-11-06 ENCOUNTER — PATIENT OUTREACH (OUTPATIENT)
Dept: CASE MANAGEMENT | Age: 74
End: 2019-11-06

## 2019-11-08 NOTE — PROGRESS NOTES
RNCM unable to reach pt/ daughter. Plan to f/u next week. This note will not be viewable in [address]

## 2019-11-11 ENCOUNTER — PATIENT OUTREACH (OUTPATIENT)
Dept: CASE MANAGEMENT | Age: 74
End: 2019-11-11

## 2019-11-12 NOTE — PROGRESS NOTES
RNCM has made multiple attempts to contact pt/ daughter without success. Noted pt is participating in Cardiac Rehab. No further outreach planned, happy to assist in future. This note will not be viewable in [address]

## 2019-11-19 PROBLEM — J06.9 URI, ACUTE: Status: ACTIVE | Noted: 2019-11-19

## 2020-01-29 PROBLEM — Z99.11 ENCOUNTER FOR WEANING FROM VENTILATOR (HCC): Status: RESOLVED | Noted: 2019-09-23 | Resolved: 2020-01-29

## 2021-04-13 NOTE — PERIOP NOTES
TRANSFER - IN REPORT:    Verbal report received from AprilANABELLE on Devin Siddiqi  being received from [facility] for routine progression of care      Report consisted of patients Situation, Background, Assessment and   Recommendations(SBAR). Information from the following report(s) SBAR, Kardex, Procedure Summary, MAR, Recent Results, Pre Procedure Checklist and Procedure Verification was reviewed with the receiving nurse. Opportunity for questions and clarification was provided. Assessment completed upon patients arrival to unit and care assumed.
TRANSFER - OUT REPORT:    Verbal report given to Christa Spence on [address]  being transferred to [facility](unit) for routine progression of care       Report consisted of patients Situation, Background, Assessment and   Recommendations(SBAR). Information from the following report(s) OR Summary was reviewed with the receiving nurse. Lines:   Double Lumen 09/23/19 Right Internal jugular (Active)       Antoniette Lies Dual 09/23/19 Right Neck (Active)       Peripheral IV 09/16/19 Anterior;Distal;Left Forearm (Active)   Site Assessment Clean, dry, & intact 9/23/2019  5:56 AM   Phlebitis Assessment 0 9/23/2019  5:56 AM   Infiltration Assessment 0 9/23/2019  5:56 AM   Dressing Status Clean, dry, & intact 9/23/2019  5:56 AM   Dressing Type Transparent;Tape 9/23/2019  5:56 AM   Hub Color/Line Status Pink; Infusing;Patent 9/23/2019  5:56 AM   Alcohol Cap Used No 9/22/2019  3:47 PM       Arterial Line 09/23/19 Left Radial artery (Active)        Opportunity for questions and clarification was provided.       Patient transported with:   Monitor  O2 @ 15 liters  Patient-specific medications from Pharmacy  Registered Nurse
Updated patient family at 8:40 AM  Updated patient family at 11:05 AM    Four digit code obtained
no

## 2022-03-18 PROBLEM — R07.89 CHEST DISCOMFORT: Status: ACTIVE | Noted: 2019-09-09

## 2022-03-19 PROBLEM — J06.9 URI, ACUTE: Status: ACTIVE | Noted: 2019-11-19

## 2022-03-19 PROBLEM — R09.02 HYPOXIA: Status: ACTIVE | Noted: 2019-09-23

## 2022-03-19 PROBLEM — I25.10 CAD (CORONARY ARTERY DISEASE): Status: ACTIVE | Noted: 2019-09-16

## 2022-03-19 PROBLEM — Z95.1 S/P CABG X 3: Status: ACTIVE | Noted: 2019-09-25

## 2022-03-20 PROBLEM — J98.11 ATELECTASIS: Status: ACTIVE | Noted: 2019-09-23

## 2022-09-12 ENCOUNTER — OFFICE VISIT (OUTPATIENT)
Dept: CARDIOLOGY CLINIC | Age: 77
End: 2022-09-12
Payer: COMMERCIAL

## 2022-09-12 VITALS
BODY MASS INDEX: 28.49 KG/M2 | HEIGHT: 62 IN | DIASTOLIC BLOOD PRESSURE: 60 MMHG | WEIGHT: 154.8 LBS | SYSTOLIC BLOOD PRESSURE: 122 MMHG | HEART RATE: 58 BPM

## 2022-09-12 DIAGNOSIS — I25.118 CORONARY ARTERY DISEASE OF NATIVE ARTERY OF NATIVE HEART WITH STABLE ANGINA PECTORIS (HCC): Primary | ICD-10-CM

## 2022-09-12 DIAGNOSIS — E78.00 PURE HYPERCHOLESTEROLEMIA: ICD-10-CM

## 2022-09-12 DIAGNOSIS — Z95.1 S/P CABG X 3: ICD-10-CM

## 2022-09-12 DIAGNOSIS — E11.9 TYPE 2 DIABETES MELLITUS WITHOUT COMPLICATION, WITHOUT LONG-TERM CURRENT USE OF INSULIN (HCC): ICD-10-CM

## 2022-09-12 DIAGNOSIS — I10 PRIMARY HYPERTENSION: ICD-10-CM

## 2022-09-12 PROCEDURE — 1123F ACP DISCUSS/DSCN MKR DOCD: CPT | Performed by: INTERNAL MEDICINE

## 2022-09-12 PROCEDURE — 93000 ELECTROCARDIOGRAM COMPLETE: CPT | Performed by: INTERNAL MEDICINE

## 2022-09-12 PROCEDURE — 99214 OFFICE O/P EST MOD 30 MIN: CPT | Performed by: INTERNAL MEDICINE

## 2022-09-12 RX ORDER — CALCIUM CARBONATE 300MG(750)
TABLET,CHEWABLE ORAL DAILY
COMMUNITY

## 2022-09-12 RX ORDER — ISOSORBIDE MONONITRATE 30 MG/1
30 TABLET, EXTENDED RELEASE ORAL DAILY
COMMUNITY

## 2022-09-12 RX ORDER — UREA 10 %
1 LOTION (ML) TOPICAL NIGHTLY PRN
COMMUNITY

## 2022-09-12 RX ORDER — NITROGLYCERIN 0.4 MG/1
0.4 TABLET SUBLINGUAL EVERY 5 MIN PRN
Qty: 25 TABLET | Refills: 11 | Status: SHIPPED | OUTPATIENT
Start: 2022-09-12

## 2022-09-12 NOTE — PROGRESS NOTES
[facility] CARDIOLOGY  [address] [facility], [address]  PHONE: [phone]      22    NAME:  Gino Knowles  : 1945  MRN: [id number]         SUBJECTIVE:   Gino Knowles is a 67 y.o. female seen for a follow up visit regarding the following:     Chief Complaint   Patient presents with    Coronary Artery Disease    Tachycardia     SVT    Follow-Up from [address] at [facility]            HPI:  Follow up  Coronary Artery Disease, Tachycardia (SVT), and Follow-Up from Hospital (Seen at [facility])   . Follow up CAD/CABG  . Admitted to [facility] , she had been experiencing heart racing and diarrhea, diagnosed with SVT with reportedly severe electrolyte derangement,  saw their cardiologists who did a nuke that reportedly showed a small, mild reversible apical defect (consistent with prior studies) and started her on long acting nitrate, echo showed normal SF and DF. DM poorly controlled with A1C 9.9%    Admits not particularly attentive to her diet. Will still feel dizzy if she turns her head quickly. Has been trying to do a little better with her diet. Minimal palpitation since discharge. No angina.  Not regularly checking blood sugar at home but  when she's done so no further hypoglycemia (had near syncope in house with BS 45)          Past cardiac history:   ASA allergy    - cardiac cath, no coronary obstruction, details not available    - normal perfusion scan   Echo normal SF, moderate MR   Oct 2016 - Echo EF 60-65%, impaired relaxation, mild MR, RVSP 31   Sep 2019- small reversible anteroapical ischemia, preserved EF   LIMA to LAD, reverse SVG to OM1, and reverse SVG to OM2   Aug 2022       Amita - NST - small, mild reversible apical defect       Echo - EF 60-65%, normal DF, no significant valve disease, normal RVSP          Key CAD CHF Meds            amLODIPine (NORVASC) 2.5 MG tablet (Taking)    Class: Historical Med    atorvastatin (LIPITOR) 80 MG tablet (Taking)    Class: Historical Med    lisinopril (PRINIVIL;ZESTRIL) 2.5 MG tablet (Taking)    Class: Historical Med    metoprolol succinate (TOPROL XL) 25 MG extended release tablet (Taking)    Class: Historical Med    furosemide (LASIX) 20 MG tablet    Class: Historical Med              Past Medical History, Past Surgical History, Family history, Social History, and Medications were all reviewed with the patient today and updated as necessary. Prior to Admission medications    Medication Sig Start Date End Date Taking? Authorizing Provider   dapagliflozin (FARXIGA) 10 MG tablet Take 10 mg by mouth every morning   Yes Historical Provider, MD   Magnesium 400 MG TABS Take by mouth daily   Yes Historical Provider, MD   nitroGLYCERIN (NITROSTAT) 0.4 MG SL tablet Place 1 tablet under the tongue every 5 minutes as needed for Chest pain 9/12/22  Yes Ginny Grajeda MD   melatonin 1 MG tablet Take 1 mg by mouth nightly as needed for Sleep   Yes Historical Provider, MD   isosorbide mononitrate (IMDUR) 30 MG extended release tablet Take 30 mg by mouth daily   Yes Historical Provider, MD   Multiple Vitamins-Minerals (MULTIVITAL PO) Take by mouth 2 times daily   Yes Historical Provider, MD   Lancets MISC Pt's choice- Test TID.  Dx:E11.9 10/22/19  Yes Ar Automatic Reconciliation   acetaminophen (TYLENOL) 500 MG tablet Take by mouth every 4 hours as needed   Yes Ar Automatic Reconciliation   amLODIPine (NORVASC) 2.5 MG tablet Take 2.5 mg by mouth daily 9/22/20  Yes Ar Automatic Reconciliation   atorvastatin (LIPITOR) 80 MG tablet Take 80 mg by mouth daily 6/3/20  Yes Ar Automatic Reconciliation   Biotin 1000 MCG TABS Take 1 tablet by mouth daily   Yes Ar Automatic Reconciliation   clopidogrel (PLAVIX) 75 MG tablet Take 75 mg by mouth daily 9/22/20  Yes Ar Automatic Reconciliation   diphenhydrAMINE (SOMINEX) 25 MG tablet Take 50 mg by mouth as needed   Yes Ar Automatic Reconciliation   ferrous sulfate (IRON 325) 325 (65 Fe) MG tablet Take 65 mg by mouth every morning (before breakfast)   Yes Ar Automatic Reconciliation   Insulin Degludec (TRESIBA FLEXTOUCH) 100 UNIT/ML SOPN Use 26 units QHS 9/22/20  Yes Ar Automatic Reconciliation   lidocaine (XYLOCAINE) 5 % ointment Apply topically as needed 9/27/19  Yes Ar Automatic Reconciliation   lisinopril (PRINIVIL;ZESTRIL) 2.5 MG tablet Take 1 tablet by mouth once daily 9/22/20  Yes Ar Automatic Reconciliation   metFORMIN (GLUCOPHAGE) 1000 MG tablet 2 times daily (with meals) 9/22/20  Yes Ar Automatic Reconciliation   metoprolol succinate (TOPROL XL) 25 MG extended release tablet Take 50 mg by mouth daily 9/22/20  Yes Ar Automatic Reconciliation   montelukast (SINGULAIR) 10 MG tablet Take 10 mg by mouth daily 10/22/19  Yes Ar Automatic Reconciliation   mupirocin (BACTROBAN) 2 % ointment Apply topically 2 times daily   Yes Ar Automatic Reconciliation   pantoprazole (PROTONIX) 40 MG tablet Take 40 mg by mouth 2 times daily 9/22/20  Yes Ar Automatic Reconciliation   tiotropium (SPIRIVA) 18 MCG inhalation capsule Inhale 18 mcg into the lungs daily 9/22/20  Yes Ar Automatic Reconciliation   furosemide (LASIX) 20 MG tablet Take one tab now for swelling  Patient not taking: Reported on 9/12/2022 1/29/20   Ar Automatic Reconciliation     Allergies   Allergen Reactions    Aspirin Itching    Codeine Itching and Swelling    Sulfa Antibiotics Itching    Cephalexin Rash    Penicillins Rash     Past Medical History:   Diagnosis Date    Anxiety and depression     Arthritis     CAD (coronary artery disease) 9/16/2019    Chest discomfort     Apr 2014 - Lexiscan MPI - normal EKG, perfusion images and LVEF    Chronic back pain     COPD (chronic obstructive pulmonary disease) [facility]     DM2 (diabetes mellitus, type 2) ([facility] 6/21/2012    Frequent UTI     GERD (gastroesophageal reflux disease)     H/O seasonal allergies     Heart murmur     Hiatal hernia     History of kidney stones HLD (hyperlipidemia) 6/21/2012    HTN (hypertension) 6/21/2012    Hypothyroidism     Psychiatric disorder     Varicose veins 6/21/2012     Past Surgical History:   Procedure Laterality Date    APPENDECTOMY  1976    BREAST BIOPSY      CARDIAC CATHETERIZATION  2007    left side    CATARACT REMOVAL  2002 and 2006    bilateral    CHOLECYSTECTOMY  1997    COLONOSCOPY  2011    two polyps    CYST REMOVAL  1973    wrist    GYN  1997    vaginal repair    HERNIA REPAIR  1997    HYSTERECTOMY (CERVIX STATUS UNKNOWN)  1982    MOHS SURGERY      ORTHOPEDIC SURGERY  1985    ankle    TUBAL LIGATION  1976    bilateral    UROLOGICAL SURGERY  1997    bladder tack     Family History   Problem Relation Age of Onset    Diabetes Mother     Coronary Art Dis Father     Diabetes Sister     Coronary Art Dis Sister     Colon Polyps Mother     Coronary Art Dis Brother     Diabetes Brother     Breast Cancer Paternal Aunt 67     Social History     Tobacco Use    Smoking status: Passive Smoke Exposure - Never Smoker    Smokeless tobacco: Never   Substance Use Topics    Alcohol use: No     Alcohol/week: 0.0 standard drinks       ROS:    Review of Systems   Constitutional: Positive for malaise/fatigue. Cardiovascular:  Positive for palpitations. Negative for chest pain. Neurological:  Positive for vertigo. PHYSICAL EXAM:   /60   Pulse 58   Ht 5' 2\" (1.575 m)   Wt 154 lb 12.8 oz (70.2 kg)   BMI 28.31 kg/m²      Wt Readings from Last 3 Encounters:   09/12/22 154 lb 12.8 oz (70.2 kg)   04/16/21 149 lb 12.8 oz (67.9 kg)     BP Readings from Last 3 Encounters:   09/12/22 122/60   04/16/21 122/60     Pulse Readings from Last 3 Encounters:   09/12/22 58   04/16/21 69           Physical Exam  Constitutional:       Appearance: Normal appearance. HENT:      Head: Normocephalic and atraumatic. Eyes:      Conjunctiva/sclera: Conjunctivae normal.   Neck:      Vascular: No carotid bruit.    Cardiovascular:      Rate and Rhythm: Normal rate 1.11 mg/dL 0.82    Glucose 70 - 99 mg/dL 323 High     Calcium 8.5 - 10.4 mg/dL 9.5    AST 5 - 34 IU/L 21    ALT <55 IU/L 24    Alkaline Phosphatase 40 - 150 IU/L 86    Protein, Total 6.2 - 8.3 g/dL 6.9    Albumin 3.4 - 4.8 g/dL 3.8    Bilirubin, Total 0.1 - 1.2 mg/dL 0.4    eGFR >59 mL/min/1.73 m2 74      8/21/22 0935     BNP <100 pg/mL 122 High       8/21/22 0935    Troponin I (ng/mL) <0.03 ng/mL 0.01      8/21/22 0935     D-Dimer <0.50 ug/mL FEU <0.27      8/21/22 0935     TSH 0.35 - 4.94 uIU/mL 1.354      8/21/22 0935     Hemoglobin A1C <5.7 % 9.9 High       4/27/21 1051     Triglycerides <150 mg/dL 266 High     Cholesterol <200 mg/dL 190    HDL Cholesterol >59 mg/dL 33 Low     LDL, Calculated <130 mg/dL 104    Comment: Desirable: < 130 mg/dL; Borderline 130 - 159 mg/dL   VLDL Cholesterol <40 mg/dL 53 High     Chol/HDL Ratio <3.5 5.8 High         EKG    Sinus  Bradycardia 58  normal axis, intervals  nstwf    CXR/IMAGING        DEVICE INTERROGATION        OUTSIDE RECORDS REVIEW    Records from outside providers have been reviewed and summarized as noted in the HPI, past history and data review sections of this note, and reviewed with patient. .       ASSESSMENT and PLAN    Marce was seen today for coronary artery disease, tachycardia and follow-up from hospital.    Diagnoses and all orders for this visit:    Coronary artery disease of native artery of native heart with stable angina pectoris ([address]  -     EKG 12 Lead    Primary hypertension  -     EKG 12 Lead    Pure hypercholesterolemia    Type 2 diabetes mellitus without complication, without long-term current use of insulin (HCC)    S/P CABG x 3    Other orders  -     nitroGLYCERIN (NITROSTAT) 0.4 MG SL tablet; Place 1 tablet under the tongue every 5 minutes as needed for Chest pain        IMPRESSION:    Episode (reported) SVT in setting of electrolyte disturbance d/t diarrhea and uncontrolled DM.   Improved since discharge, notify if palpitations worse    Mildly abnormal NST not significantly changed from prior study, recent CABG, no angina, low risk study    Normal SF by echo    Good regimen including SGLT2i and GLP-1 agents, encouraged healthier diet, DM followed by PCP and currently poorly controlled, patient engagement so-so. BP controlled continue meds        Return in about 6 months (around 3/12/2023). Thank you for allowing me to participate in this patient's care. Please call or contact me if there are any questions or concerns regarding the above.       Von Valentine MD  09/12/22  10:58 AM

## 2022-09-12 NOTE — PATIENT INSTRUCTIONS
Patient Education        Palpitations: Care Instructions  Overview     Heart palpitations are the uncomfortable sensation that your heart is beating fast or irregularly. You might feel pounding or fluttering in your chest. Itmight feel like your heart is skipping a beat. Palpitations may be caused by a heart problem. But they also occur because of many other things. These include other health problems, stress, exercise, or use of alcohol, caffeine, or nicotine. Some prescription medicines and over-the-counter medicines can also cause heart palpitations. Nearly everyonehas palpitations from time to time. Depending on your symptoms, your doctor may need to do more tests to try tofind the cause of your palpitations. Follow-up care is a key part of your treatment and safety. Be sure to make and go to all appointments, and call your doctor if you are having problems. It's also a good idea to know your test results and keep alist of the medicines you take. How can you care for yourself at home? If they trigger palpitations, limit or avoid alcohol or caffeine. Do not smoke. If you need help quitting, talk to your doctor about stop-smoking programs and medicines. These can increase your chances of quitting for good. Ask your doctor whether you can take over-the-counter medicines (such as decongestants). These may cause palpitations. If you think you may have a problem with drug use, talk to your doctor. Certain drugs, such as cocaine and methamphetamine, can affect your heart rate and rhythm. If you have palpitations again, take deep breaths and try to relax. Or try any physical things that your doctor recommended. These may include bearing down or coughing. If you start to feel lightheaded, sit or lie down to avoid injuries that might result if you pass out and fall down. If your doctor recommends it, keep a record of your palpitations and bring it to your next doctor's appointment. Write down:   The date and time.  Your pulse. (If your heart is beating fast, it may be hard to count your pulse.)  If your heart rhythm was regular or irregular. What you were doing when the palpitations started. How long the palpitations lasted. Any other symptoms. What may have helped your symptoms go away. If an activity causes palpitations, slow down or stop. Talk to your doctor before you do that activity again. Take your medicines exactly as prescribed. Call your doctor if you think you are having a problem with your medicine. When should you call for help? Call 911 anytime you think you may need emergency care. For example, call if:    You passed out (lost consciousness). You have symptoms of a heart attack. These may include:  Chest pain or pressure, or a strange feeling in the chest.  Sweating. Shortness of breath. Pain, pressure, or a strange feeling in the back, neck, jaw, or upper belly or in one or both shoulders or arms. Lightheadedness or sudden weakness. A fast or irregular heartbeat. After you call 911, the  may tell you to chew 1 adult-strength or 2 to 4 low-dose aspirin. Wait for an ambulance. Do not try to drive yourself. You have symptoms of a stroke. These may include:  Sudden numbness, tingling, weakness, or loss of movement in your face, arm, or leg, especially on only one side of your body. Sudden vision changes. Sudden trouble speaking. Sudden confusion or trouble understanding simple statements. Sudden problems with walking or balance. A sudden, severe headache that is different from past headaches. Call your doctor now or seek immediate medical care if:    You have heart palpitations and:  Are dizzy or lightheaded, or you feel like you may faint. Have new or increased shortness of breath. Watch closely for changes in your health, and be sure to contact your doctor if:    You continue to have heart palpitations. Where can you learn more?   Go to [email or web address] and sign in to your [employer] account. Enter R508 in the [facility] box to learn more about \"Palpitations: Care Instructions. \"     If you do not have an account, please click on the \"Sign Up Now\" link. Current as of: January 10, 2022               Content Version: 13.3  © 8923-9785 Healthwise, [facility]. Care instructions adapted under license by [facility] ([facility]). If you have questions about a medical condition or this instruction, always ask your healthcare professional. [address] any warranty or liability for your use of this information.

## 2023-01-17 NOTE — PROGRESS NOTES
[facility] CARDIOLOGY  [address] [facility], [address], [address] [place] [address]  PHONE: [phone]      23    NAME:  Tolu Falk  : 1945  MRN: [id number]         SUBJECTIVE:   Tolu Falk is a 65 y.o. female seen for a follow up visit regarding the following:     Chief Complaint   Patient presents with    Dizziness              HPI:  Follow up  Dizziness   . Follow up CAD/CABG  . Prior SVT in setting of metabolic derangement, poorly controlled DM. Needs lipid panel. Was having palpitations and dizziness but better the last couple of days. She's  had a URI, taking nyquil. No exercise, feels palpitations when she is active so is not active.                  Past cardiac history:   ASA allergy    - cardiac cath, no coronary obstruction, details not available    - normal perfusion scan   Echo normal SF, moderate MR   Oct 2016 - Echo EF 60-65%, impaired relaxation, mild MR, RVSP 31   Sep 2019- small reversible anteroapical ischemia, preserved EF   LIMA to LAD, reverse SVG to OM1, and reverse SVG to OM2   Aug 2022       Amita - NST - small, mild reversible apical defect       Echo - EF 60-65%, normal DF, no significant valve disease, normal RVSP             Key CAD CHF Meds            amLODIPine (NORVASC) 2.5 MG tablet (Taking)    Class: Historical Med    atorvastatin (LIPITOR) 80 MG tablet (Taking)    Class: Historical Med    furosemide (LASIX) 20 MG tablet (Taking)    Class: Historical Med    lisinopril (PRINIVIL;ZESTRIL) 2.5 MG tablet    Class: Historical Med    metoprolol succinate (TOPROL XL) 25 MG extended release tablet    Class: Historical Med          Key Antihyperglycemic Medications            dapagliflozin (FARXIGA) 10 MG tablet (Taking)    Class: Historical Med    Insulin Degludec (TRESIBA FLEXTOUCH) 100 UNIT/ML SOPN (Taking)    Class: Historical Med    metFORMIN (GLUCOPHAGE) 1000 MG tablet (Taking)    Class: Historical Med                Past Medical History, Past Surgical History, Family history, Social History, and Medications were all reviewed with the patient today and updated as necessary. Prior to Admission medications    Medication Sig Start Date End Date Taking? Authorizing Provider   Fluticasone-Umeclidin-Vilant (Maude Mimes [place]) Inhale into the lungs   Yes Historical Provider, MD   dapagliflozin (FARXIGA) 10 MG tablet Take 10 mg by mouth every morning   Yes Historical Provider, MD   Magnesium 400 MG TABS Take by mouth daily Zinc and calcium   Yes Historical Provider, MD   nitroGLYCERIN (NITROSTAT) 0.4 MG SL tablet Place 1 tablet under the tongue every 5 minutes as needed for Chest pain 9/12/22  Yes Obi Nunez MD   melatonin 1 MG tablet Take 5 mg by mouth nightly as needed for Sleep   Yes Historical Provider, MD   Multiple Vitamins-Minerals (MULTIVITAL PO) Take by mouth 2 times daily   Yes Historical Provider, MD   Lancets MISC Pt's choice- Test TID.  Dx:E11.9 10/22/19  Yes Ar Automatic Reconciliation   acetaminophen (TYLENOL) 500 MG tablet Take by mouth every 4 hours as needed   Yes Ar Automatic Reconciliation   amLODIPine (NORVASC) 2.5 MG tablet Take 2.5 mg by mouth daily 9/22/20  Yes Ar Automatic Reconciliation   atorvastatin (LIPITOR) 80 MG tablet Take 80 mg by mouth daily 6/3/20  Yes Ar Automatic Reconciliation   clopidogrel (PLAVIX) 75 MG tablet Take 75 mg by mouth daily 9/22/20  Yes Ar Automatic Reconciliation   ferrous sulfate (IRON 325) 325 (65 Fe) MG tablet Take 65 mg by mouth every morning (before breakfast)   Yes Ar Automatic Reconciliation   furosemide (LASIX) 20 MG tablet Take one tab now for swelling    PRN 1/29/20  Yes Ar Automatic Reconciliation   Insulin Degludec (TRESIBA FLEXTOUCH) 100 UNIT/ML SOPN Use 26 units QHS 9/22/20  Yes Ar Automatic Reconciliation   lidocaine (XYLOCAINE) 5 % ointment Apply topically as needed 9/27/19  Yes Ar Automatic Reconciliation   metFORMIN (GLUCOPHAGE) 1000 MG tablet 2 times daily (with meals) 9/22/20  Yes Ar Automatic Reconciliation   mupirocin (BACTROBAN) 2 % ointment Apply topically 2 times daily   Yes Ar Automatic Reconciliation   pantoprazole (PROTONIX) 40 MG tablet Take 40 mg by mouth 2 times daily 9/22/20  Yes Ar Automatic Reconciliation   tiotropium (SPIRIVA) 18 MCG inhalation capsule Inhale 18 mcg into the lungs daily 9/22/20  Yes Ar Automatic Reconciliation   isosorbide mononitrate (IMDUR) 30 MG extended release tablet Take 30 mg by mouth daily  Patient not taking: Reported on 1/18/2023    Historical Provider, MD   Biotin 1000 MCG TABS Take 1 tablet by mouth daily  Patient not taking: Reported on 1/18/2023    Ar Automatic Reconciliation   diphenhydrAMINE (SOMINEX) 25 MG tablet Take 50 mg by mouth as needed  Patient not taking: Reported on 1/18/2023    Ar Automatic Reconciliation   lisinopril (PRINIVIL;ZESTRIL) 2.5 MG tablet Take 1 tablet by mouth once daily  Patient not taking: Reported on 1/18/2023 9/22/20   Ar Automatic Reconciliation   metoprolol succinate (TOPROL XL) 25 MG extended release tablet Take 50 mg by mouth daily 9/22/20   Ar Automatic Reconciliation   montelukast (SINGULAIR) 10 MG tablet Take 10 mg by mouth daily  Patient not taking: Reported on 1/18/2023 10/22/19   Ar Automatic Reconciliation     Allergies   Allergen Reactions    Aspirin Itching    Codeine Itching and Swelling    Sulfa Antibiotics Itching    Cephalexin Rash    Penicillins Rash     Past Medical History:   Diagnosis Date    Anxiety and depression     Arthritis     CAD (coronary artery disease) 9/16/2019    Chest discomfort     Apr 2014 - [facility] - normal EKG, perfusion images and LVEF    Chronic back pain     COPD (chronic obstructive pulmonary disease) ([facility])     DM2 (diabetes mellitus, type 2) ([facility] [address] 6/21/2012    Frequent UTI     GERD (gastroesophageal reflux disease)     H/O seasonal allergies     Heart murmur     Hiatal hernia     History of kidney stones     HLD (hyperlipidemia) 6/21/2012    HTN (hypertension) 6/21/2012    Hypothyroidism     Psychiatric disorder     Varicose veins 6/21/2012     Past Surgical History:   Procedure Laterality Date    APPENDECTOMY  1976    BREAST BIOPSY      CARDIAC CATHETERIZATION  2007    left side    CATARACT REMOVAL  2002 and 2006    bilateral    CHOLECYSTECTOMY  1997    COLONOSCOPY  2011    two polyps    CYST REMOVAL  1973    wrist    GYN  1997    vaginal repair    HERNIA REPAIR  1997    HYSTERECTOMY (CERVIX STATUS UNKNOWN)  1982    MOHS SURGERY      ORTHOPEDIC SURGERY  1985    ankle    TUBAL LIGATION  1976    bilateral    UROLOGICAL SURGERY  1997    bladder tack     Family History   Problem Relation Age of Onset    Diabetes Mother     Coronary Art Dis Father     Diabetes Sister     Coronary Art Dis Sister     Colon Polyps Mother     Coronary Art Dis Brother     Diabetes Brother     Breast Cancer Paternal Aunt 67     Social History     Tobacco Use    Smoking status: Passive Smoke Exposure - Never Smoker    Smokeless tobacco: Never   Substance Use Topics    Alcohol use: No     Alcohol/week: 0.0 standard drinks       ROS:    Review of Systems   Constitutional: Positive for malaise/fatigue. Cardiovascular:  Positive for palpitations. PHYSICAL EXAM:   BP (!) 156/78   Pulse 75   Ht 5' 2\" (1.575 m)   Wt 152 lb 3.2 oz (69 kg)   BMI 27.84 kg/m²      Wt Readings from Last 3 Encounters:   01/18/23 152 lb 3.2 oz (69 kg)   09/12/22 154 lb 12.8 oz (70.2 kg)   04/16/21 149 lb 12.8 oz (67.9 kg)     BP Readings from Last 3 Encounters:   01/18/23 (!) 156/78   09/12/22 122/60   04/16/21 122/60     Pulse Readings from Last 3 Encounters:   01/18/23 75   09/12/22 58   04/16/21 69           Physical Exam  Constitutional:       Appearance: Normal appearance. HENT:      Head: Normocephalic and atraumatic. Eyes:      Conjunctiva/sclera: Conjunctivae normal.   Neck:      Vascular: No carotid bruit. Cardiovascular:      Rate and Rhythm: Normal rate and regular rhythm.       Heart sounds: No murmur heard. No friction rub. No gallop. Pulmonary:      Effort: No respiratory distress. Breath sounds: No wheezing or rales. Musculoskeletal:         General: No swelling. Cervical back: Neck supple. Skin:     General: Skin is warm and dry. Neurological:      General: No focal deficit present. Mental Status: She is alert. Psychiatric:         Mood and Affect: Mood normal.         Behavior: Behavior normal.       Medical problems and test results were reviewed with the patient today. DATA REVIEW    LIPID PANEL     Lab Results   Component Value Date    CHOL 152 09/22/2020    CHOL 148 01/29/2020     Lab Results   Component Value Date    TRIG 362 (H) 09/22/2020    TRIG 235 (H) 01/29/2020     Lab Results   Component Value Date    HDL 33 (L) 09/22/2020    HDL 37 (L) 01/29/2020     Lab Results   Component Value Date    LDLCALC 63 09/22/2020    LDLCALC 64 01/29/2020     Lab Results   Component Value Date    LABVLDL 47 (H) 01/29/2020    VLDL 56 (H) 09/22/2020     No results found for: CHOLHDLRATIO    BMP  Lab Results   Component Value Date/Time     09/22/2020 10:41 AM    K 4.0 09/22/2020 10:41 AM    CL 98 09/22/2020 10:41 AM    CO2 22 09/22/2020 10:41 AM    BUN 15 09/22/2020 10:41 AM    CREATININE 0.62 09/22/2020 10:41 AM    GLUCOSE 268 09/22/2020 10:41 AM    CALCIUM 9.4 09/22/2020 10:41 AM            Hemoglobin A1C   Mean Bld Glu Estim. Component 08/21/2022 04/27/2021        Hemoglobin A1C 9.9 High            EKG    Sinus  Rhythm 75  normal axis, intervals  cannot exclude IMI age undetermined  NSTWF      CXR/IMAGING        DEVICE INTERROGATION        OUTSIDE RECORDS REVIEW    Records from outside providers have been reviewed and summarized as noted in the HPI, past history and data review sections of this note, and reviewed with patient. .       ASSESSMENT and PLAN    Marce was seen today for dizziness.     Diagnoses and all orders for this visit:    Coronary artery disease of native artery of native heart with stable angina pectoris (HCC)  -     EKG 12 Lead    S/P CABG x 3  -     EKG 12 Lead    Type 2 diabetes mellitus without complication, with long-term current use of insulin (HCC)    Essential hypertension  -     EKG 12 Lead    Dyslipidemia  -     EKG 12 Lead    Palpitations  -     Cardiac event monitor; Future  -     Nuclear stress test with myocardial perfusion; Future  -     Transthoracic echocardiogram (TTE) complete with contrast, bubble, strain, and 3D PRN; Future    Shortness of breath  -     Nuclear stress test with myocardial perfusion; Future  -     Transthoracic echocardiogram (TTE) complete with contrast, bubble, strain, and 3D PRN; Future        IMPRESSION:    Palpitations, fatigue, dyspnea. Stop OTC cold medications, use coricidin hbp prn    Return to repeat echo and perfusion study, Moderate to high pre test probability of disease is class IIa indication for imaging protocol    7 day monitor assess palpitations. Return for AFTER PROCEDURE TO REVIEW RESULTS. Thank you for allowing me to participate in this patient's care. Please call or contact me if there are any questions or concerns regarding the above.       Carl Gardner MD  01/18/23  1:27 PM

## 2023-01-18 ENCOUNTER — OFFICE VISIT (OUTPATIENT)
Dept: CARDIOLOGY CLINIC | Age: 78
End: 2023-01-18
Payer: COMMERCIAL

## 2023-01-18 VITALS
HEIGHT: 62 IN | BODY MASS INDEX: 28.01 KG/M2 | SYSTOLIC BLOOD PRESSURE: 156 MMHG | HEART RATE: 75 BPM | DIASTOLIC BLOOD PRESSURE: 78 MMHG | WEIGHT: 152.2 LBS

## 2023-01-18 DIAGNOSIS — R06.02 SHORTNESS OF BREATH: ICD-10-CM

## 2023-01-18 DIAGNOSIS — E11.9 TYPE 2 DIABETES MELLITUS WITHOUT COMPLICATION, WITH LONG-TERM CURRENT USE OF INSULIN (HCC): ICD-10-CM

## 2023-01-18 DIAGNOSIS — Z79.4 TYPE 2 DIABETES MELLITUS WITHOUT COMPLICATION, WITH LONG-TERM CURRENT USE OF INSULIN (HCC): ICD-10-CM

## 2023-01-18 DIAGNOSIS — Z95.1 S/P CABG X 3: ICD-10-CM

## 2023-01-18 DIAGNOSIS — I25.118 CORONARY ARTERY DISEASE OF NATIVE ARTERY OF NATIVE HEART WITH STABLE ANGINA PECTORIS (HCC): Primary | ICD-10-CM

## 2023-01-18 DIAGNOSIS — R00.2 PALPITATIONS: ICD-10-CM

## 2023-01-18 DIAGNOSIS — I10 ESSENTIAL HYPERTENSION: ICD-10-CM

## 2023-01-18 DIAGNOSIS — E78.5 DYSLIPIDEMIA: ICD-10-CM

## 2023-01-18 PROCEDURE — 99214 OFFICE O/P EST MOD 30 MIN: CPT | Performed by: INTERNAL MEDICINE

## 2023-01-18 PROCEDURE — 3077F SYST BP >= 140 MM HG: CPT | Performed by: INTERNAL MEDICINE

## 2023-01-18 PROCEDURE — 93000 ELECTROCARDIOGRAM COMPLETE: CPT | Performed by: INTERNAL MEDICINE

## 2023-01-18 PROCEDURE — 1123F ACP DISCUSS/DSCN MKR DOCD: CPT | Performed by: INTERNAL MEDICINE

## 2023-01-18 PROCEDURE — 3078F DIAST BP <80 MM HG: CPT | Performed by: INTERNAL MEDICINE

## 2023-01-18 NOTE — PATIENT INSTRUCTIONS
[facility] HBPPatient Education        Palpitations: Care Instructions  Overview     Heart palpitations are the uncomfortable sensation that your heart is beating fast or irregularly. You might feel pounding or fluttering in your chest. It might feel like your heart is skipping a beat. Palpitations may be caused by a heart problem. But they also occur because of many other things. These include other health problems, stress, exercise, or use of alcohol, caffeine, or nicotine. Some prescription medicines and over-the-counter medicines can also cause heart palpitations. Nearly everyone has palpitations from time to time. Depending on your symptoms, your doctor may need to do more tests to try to find the cause of your palpitations. Follow-up care is a key part of your treatment and safety. Be sure to make and go to all appointments, and call your doctor if you are having problems. It's also a good idea to know your test results and keep a list of the medicines you take. How can you care for yourself at home? If they trigger palpitations, limit or avoid alcohol or caffeine. Do not smoke. If you need help quitting, talk to your doctor about stop-smoking programs and medicines. These can increase your chances of quitting for good. Ask your doctor whether you can take over-the-counter medicines (such as decongestants). These may cause palpitations. If you think you may have a problem with drug use, talk to your doctor. Certain drugs, such as cocaine and methamphetamine, can affect your heart rate and rhythm. If you have palpitations again, take deep breaths and try to relax. Or try any physical things that your doctor recommended. These may include bearing down or coughing. If you start to feel lightheaded, sit or lie down to avoid injuries that might result if you pass out and fall down. If your doctor recommends it, keep a record of your palpitations and bring it to your next doctor's appointment.  Write down:  The date and time. Your pulse. (If your heart is beating fast, it may be hard to count your pulse.)  If your heart rhythm was regular or irregular. What you were doing when the palpitations started. How long the palpitations lasted. Any other symptoms. What may have helped your symptoms go away. If an activity causes palpitations, slow down or stop. Talk to your doctor before you do that activity again. Take your medicines exactly as prescribed. Call your doctor if you think you are having a problem with your medicine. When should you call for help? Call 911 anytime you think you may need emergency care. For example, call if:    You passed out (lost consciousness). You have symptoms of a heart attack. These may include:  Chest pain or pressure, or a strange feeling in the chest.  Sweating. Shortness of breath. Pain, pressure, or a strange feeling in the back, neck, jaw, or upper belly or in one or both shoulders or arms. Lightheadedness or sudden weakness. A fast or irregular heartbeat. After you call 911, the  may tell you to chew 1 adult-strength or 2 to 4 low-dose aspirin. Wait for an ambulance. Do not try to drive yourself. You have symptoms of a stroke. These may include:  Sudden numbness, tingling, weakness, or loss of movement in your face, arm, or leg, especially on only one side of your body. Sudden vision changes. Sudden trouble speaking. Sudden confusion or trouble understanding simple statements. Sudden problems with walking or balance. A sudden, severe headache that is different from past headaches. Call your doctor now or seek immediate medical care if:    You have heart palpitations and:  Are dizzy or lightheaded, or you feel like you may faint. Have new or increased shortness of breath. Watch closely for changes in your health, and be sure to contact your doctor if:    You continue to have heart palpitations. Where can you learn more?   Go to http://www.[employer]/ and enter R508 to learn more about \"Palpitations: Care Instructions. \"  Current as of: September 7, 2022               Content Version: 13.5  © 2063-2141 Healthwise, [employer]. Care instructions adapted under license by [facility] ([facility]). If you have questions about a medical condition or this instruction, always ask your healthcare professional. Jeanne Eugene any warranty or liability for your use of this information.

## 2023-01-19 ENCOUNTER — TELEPHONE (OUTPATIENT)
Dept: CARDIOLOGY CLINIC | Age: 78
End: 2023-01-19

## 2023-01-19 DIAGNOSIS — I10 HTN (HYPERTENSION): Primary | ICD-10-CM

## 2023-01-19 DIAGNOSIS — I47.1 SVT (SUPRAVENTRICULAR TACHYCARDIA) (HCC): ICD-10-CM

## 2023-01-19 NOTE — TELEPHONE ENCOUNTER
MD Doni Del Valle, RN  Caller: Unspecified (Today,  8:36 AM)  Recurrent SVT, happened previously with metabolic derangement. Please begin metoprolol succinate 25 mg once daily. Advised yesterday to stop over the counter cold medications and caffeine. Let's get a CMP, Mg and TSH, and continue monitor. thanks       Attempted to call pt. No answer. Left message to call.

## 2023-01-19 NOTE — TELEPHONE ENCOUNTER
Received critical notification fax from [employer] [place] indicating several tachycardia episodes last night, rates 170-180. Strips placed on MD work station to review. Please advise.

## 2023-01-23 NOTE — TELEPHONE ENCOUNTER
Attempted to call pt and grand-daughter after reviewing BRE. No answer. Left VM for both to call this office. Dr. Joe Powell notified that pt has not yet been reached. Receiving frequent serious reports of SVT from [facility]. Scheduled for nuke on 1/25, echo on 1/27.

## 2023-01-25 DIAGNOSIS — I10 HTN (HYPERTENSION): ICD-10-CM

## 2023-01-25 DIAGNOSIS — I47.1 SVT (SUPRAVENTRICULAR TACHYCARDIA) (HCC): ICD-10-CM

## 2023-01-26 LAB
ALBUMIN SERPL-MCNC: 3.6 G/DL (ref 3.2–4.6)
ALBUMIN/GLOB SERPL: 1.3 (ref 0.4–1.6)
ALP SERPL-CCNC: 109 U/L (ref 50–136)
ALT SERPL-CCNC: 19 U/L (ref 12–65)
ANION GAP SERPL CALC-SCNC: 8 MMOL/L (ref 2–11)
AST SERPL-CCNC: 14 U/L (ref 15–37)
BILIRUB SERPL-MCNC: 0.2 MG/DL (ref 0.2–1.1)
BUN SERPL-MCNC: 9 MG/DL (ref 8–23)
CALCIUM SERPL-MCNC: 8.9 MG/DL (ref 8.3–10.4)
CHLORIDE SERPL-SCNC: 105 MMOL/L (ref 101–110)
CO2 SERPL-SCNC: 28 MMOL/L (ref 21–32)
CREAT SERPL-MCNC: 0.7 MG/DL (ref 0.6–1)
GLOBULIN SER CALC-MCNC: 2.7 G/DL (ref 2.8–4.5)
GLUCOSE SERPL-MCNC: 288 MG/DL (ref 65–100)
MAGNESIUM SERPL-MCNC: 1.8 MG/DL (ref 1.8–2.4)
POTASSIUM SERPL-SCNC: 3.9 MMOL/L (ref 3.5–5.1)
PROT SERPL-MCNC: 6.3 G/DL (ref 6.3–8.2)
SODIUM SERPL-SCNC: 141 MMOL/L (ref 133–143)
TSH, 3RD GENERATION: 0.89 UIU/ML (ref 0.36–3.74)

## 2023-02-07 NOTE — PROGRESS NOTES
[facility] CARDIOLOGY  [address], [address]  [facility], [address] [place] [address]  PHONE: [phone]      23    NAME:  Elvis Clifton  : 1945  MRN: [id number]         SUBJECTIVE:   Elvis Clifton is a 65 y.o. female seen for a follow up visit regarding the following:     Chief Complaint   Patient presents with    Coronary Artery Disease    Shortness of Breath    Palpitations     Had Echo, NST, Monitor              HPI:  Follow up  Coronary Artery Disease, Shortness of Breath, and Palpitations (Had Echo, NST, Monitor)   . Follow up CAD/CABG  2019. Prior SVT in setting of metabolic derangement, poorly controlled DM. Needs lipid panel. Stress test and echo ok, appear below as does monitor with some short runs of tachycardia without diary entries but she tells me today that she had symptoms the entire time, she was unaware that she should write them down. She describes palpitations, flushing, and dizziness with activity to the point she feels she may pass out. Her echo shows stable findings with normal AVR, there is mildly abn DF with E/e' 12, she is on loop diuretic, and her perfusion study was normal.  She did go to her car and bring in her medications today which is appreciated, but significant questions remain    I reviewed her PCP note from last month with her, at which time it was believed she was taking [place], having had an A1C of nearly 10% on labs from August.  That is not in her medication bag and she asks me if that is a \"pen\" because she has a \"pen\" of something in her refrigerator at home but doesn't know what it is. She says the only thing not in her bag is insulin, but is uncertain what this injectable medication is. It is notable that her BP here today is on the low side. She is on only low dose loop diuretic and amlodipine. She checks neither her blood sugar nor her blood pressure at home.                 Past cardiac history:   ASA allergy    - cardiac cath, no coronary obstruction, details not available   2014 - normal perfusion scan   Echo normal SF, moderate MR   Oct 2016 - Echo EF 60-65%, impaired relaxation, mild MR, RVSP 31   Sep 2019- small reversible anteroapical ischemia, preserved EF   LIMA to LAD, reverse SVG to OM1, and reverse SVG to OM2   Aug 2022       [facility] - NST - small, mild reversible apical defect       Echo - EF 60-65%, normal DF, no significant valve disease, normal RVSP  Jan 2023      7 day monitor- NSR Several brief runs of narrow complex, short R-P tachycardia, overall burden 2%, rates to 180. No diary entries but states she did have symptoms       Echo - EF 66%, mild LVH, abn DF, E/e' 12, mild AI, mild/mod MR, RVSP 42, mod LAE       NST - normal perfusion and LVEF                       Key CAD CHF Meds            atorvastatin (LIPITOR) 80 MG tablet (Taking)    Class: Historical Med    furosemide (LASIX) 20 MG tablet (Taking)    Class: Historical Med          Key Antihyperglycemic Medications            Insulin Degludec (TRESIBA FLEXTOUCH) 100 UNIT/ML SOPN (Taking)    Class: Historical Med    metFORMIN (GLUCOPHAGE) 1000 MG tablet (Taking)    Class: Historical Med                Past Medical History, Past Surgical History, Family history, Social History, and Medications were all reviewed with the patient today and updated as necessary. Prior to Admission medications    Medication Sig Start Date End Date Taking?  Authorizing Provider   Fluticasone-Umeclidin-Vilant (Christine Schumacher [place]) Inhale into the lungs   Yes Historical Provider, MD   Magnesium 400 MG TABS Take by mouth daily Zinc and calcium   Yes Historical Provider, MD   nitroGLYCERIN (NITROSTAT) 0.4 MG SL tablet Place 1 tablet under the tongue every 5 minutes as needed for Chest pain 9/12/22  Yes Luis Handley MD   melatonin 1 MG tablet Take 5 mg by mouth nightly as needed for Sleep   Yes Historical Provider, MD   Multiple Vitamins-Minerals (MULTIVITAL PO) Take by mouth daily Calcium, Magnesium & Zinc   Yes Historical Provider, MD   Lancets MISC Pt's choice- Test TID.  Dx:E11.9 10/22/19  Yes Ar Automatic Reconciliation   acetaminophen (TYLENOL) 500 MG tablet Take by mouth every 4 hours as needed   Yes Ar Automatic Reconciliation   atorvastatin (LIPITOR) 80 MG tablet Take 80 mg by mouth daily 6/3/20  Yes Ar Automatic Reconciliation   clopidogrel (PLAVIX) 75 MG tablet Take 75 mg by mouth daily 9/22/20  Yes Ar Automatic Reconciliation   ferrous sulfate (IRON 325) 325 (65 Fe) MG tablet Take 65 mg by mouth every morning (before breakfast)   Yes Ar Automatic Reconciliation   furosemide (LASIX) 20 MG tablet Take one tab now for swelling    PRN 1/29/20  Yes Ar Automatic Reconciliation   Insulin Degludec (TRESIBA FLEXTOUCH) 100 UNIT/ML SOPN Use 26 units QHS 9/22/20  Yes Ar Automatic Reconciliation   lidocaine (XYLOCAINE) 5 % ointment Apply topically as needed 9/27/19  Yes Ar Automatic Reconciliation   metFORMIN (GLUCOPHAGE) 1000 MG tablet 2 times daily (with meals) 9/22/20  Yes Ar Automatic Reconciliation   montelukast (SINGULAIR) 10 MG tablet Take 10 mg by mouth as needed 10/22/19  Yes Ar Automatic Reconciliation   mupirocin (BACTROBAN) 2 % ointment Apply topically 2 times daily   Yes Ar Automatic Reconciliation   pantoprazole (PROTONIX) 40 MG tablet Take 40 mg by mouth 2 times daily 9/22/20  Yes Ar Automatic Reconciliation   tiotropium (SPIRIVA) 18 MCG inhalation capsule Inhale 18 mcg into the lungs daily 9/22/20  Yes Ar Automatic Reconciliation     Allergies   Allergen Reactions    Aspirin Itching    Codeine Itching and Swelling    Sulfa Antibiotics Itching    Cephalexin Rash    Penicillins Rash     Past Medical History:   Diagnosis Date    Anxiety and depression     Arthritis     CAD (coronary artery disease) 9/16/2019    Chest discomfort     Apr 2014 - Lexiscan MPI - normal EKG, perfusion images and LVEF    Chronic back pain     COPD (chronic obstructive pulmonary disease) (HCC)     DM2 (diabetes mellitus, type 2) ([facility] [address] 6/21/2012    Frequent UTI     GERD (gastroesophageal reflux disease)     H/O seasonal allergies     Heart murmur     Hiatal hernia     History of kidney stones     HLD (hyperlipidemia) 6/21/2012    HTN (hypertension) 6/21/2012    Hypothyroidism     Psychiatric disorder     Varicose veins 6/21/2012     Past Surgical History:   Procedure Laterality Date    APPENDECTOMY  1976    BREAST BIOPSY      CARDIAC CATHETERIZATION  2007    left side    CATARACT REMOVAL  2002 and 2006    bilateral    CHOLECYSTECTOMY  1997    COLONOSCOPY  2011    two polyps    CYST REMOVAL  1973    wrist    GYN  1997    vaginal repair    HERNIA REPAIR  1997    HYSTERECTOMY (CERVIX STATUS UNKNOWN)  [address]    ankle    TUBAL LIGATION  1976    bilateral    UROLOGICAL SURGERY  1997    bladder tack     Family History   Problem Relation Age of Onset    Diabetes Mother     Coronary Art Dis Father     Diabetes Sister     Coronary Art Dis Sister     Colon Polyps Mother     Coronary Art Dis Brother     Diabetes Brother     Breast Cancer Paternal Aunt 67     Social History     Tobacco Use    Smoking status: Never     Passive exposure: Yes    Smokeless tobacco: Never   Substance Use Topics    Alcohol use: No     Alcohol/week: 0.0 standard drinks       ROS:    Review of Systems   Constitutional: Positive for malaise/fatigue. Cardiovascular:  Positive for near-syncope and palpitations. Respiratory:  Positive for shortness of breath. Neurological:  Positive for weakness.         PHYSICAL EXAM:   /60   Pulse 64   Ht 5' 1\" (1.549 m)   Wt 156 lb 12.8 oz (71.1 kg)   BMI 29.63 kg/m²      Wt Readings from Last 3 Encounters:   02/08/23 156 lb 12.8 oz (71.1 kg)   01/27/23 152 lb (68.9 kg)   01/25/23 152 lb (68.9 kg)     BP Readings from Last 3 Encounters:   02/08/23 118/60   01/27/23 (!) 150/80   01/25/23 (!) 176/81     Pulse Readings from Last 3 Encounters:   02/08/23 64 01/25/23 68   01/18/23 75           Physical Exam  Constitutional:       Appearance: Normal appearance. HENT:      Head: Normocephalic and atraumatic. Eyes:      Conjunctiva/sclera: Conjunctivae normal.   Neck:      Vascular: No carotid bruit. Cardiovascular:      Rate and Rhythm: Normal rate and regular rhythm. Heart sounds: No murmur heard. No friction rub. No gallop. Pulmonary:      Effort: No respiratory distress. Breath sounds: No wheezing or rales. Musculoskeletal:         General: No swelling. Cervical back: Neck supple. Skin:     General: Skin is warm and dry. Neurological:      General: No focal deficit present. Mental Status: She is alert. Psychiatric:         Mood and Affect: Mood normal. Affect is flat. Behavior: Behavior normal.       Medical problems and test results were reviewed with the patient today. DATA REVIEW    LIPID PANEL     Lab Results   Component Value Date    CHOL 152 09/22/2020    CHOL 148 01/29/2020     Lab Results   Component Value Date    TRIG 362 (H) 09/22/2020    TRIG 235 (H) 01/29/2020     Lab Results   Component Value Date    HDL 33 (L) 09/22/2020    HDL 37 (L) 01/29/2020     Lab Results   Component Value Date    LDLCALC 63 09/22/2020    LDLCALC 64 01/29/2020     Lab Results   Component Value Date    LABVLDL 47 (H) 01/29/2020    VLDL 56 (H) 09/22/2020     No results found for: CHOLHDLRATIO    BMP  Lab Results   Component Value Date/Time     01/25/2023 07:55 AM    K 3.9 01/25/2023 07:55 AM     01/25/2023 07:55 AM    CO2 28 01/25/2023 07:55 AM    BUN 9 01/25/2023 07:55 AM    CREATININE 0.70 01/25/2023 07:55 AM    GLUCOSE 288 01/25/2023 07:55 AM    CALCIUM 8.9 01/25/2023 07:55 AM            Hemoglobin A1C   Mean Bld Glu Estim.      Component 08/21/2022 04/27/2021        Hemoglobin A1C 9.9 High            EKG        CXR/IMAGING        DEVICE INTERROGATION        OUTSIDE RECORDS REVIEW    Records from outside providers have been reviewed and summarized as noted in the HPI, past history and data review sections of this note, and reviewed with patient. .       ASSESSMENT and PLAN    Marce was seen today for coronary artery disease, shortness of breath and palpitations. Diagnoses and all orders for this visit:    Coronary artery disease of native artery of native heart with stable angina pectoris ([facility] [address]    Pure hypercholesterolemia    Essential hypertension    Type 2 diabetes mellitus without complication, with long-term current use of insulin ([facility] [address]        IMPRESSION:    Once again, not entirely clear what she is taking. She describes symptoms out of proportion to any findings on cardiovascular evaluation. I believe 2 possibilities are orthostatic hypotension related to possible autonomic neuropathy of uncontrolled DM, or uncontrolled DM itself    Have instructed her to stop amlodipine. Leaving her on furosemide with dyspnea and abnormal DF. Instructed her to Check blood sugar 4 times a day, before meals and at bedtime, bring diary and all medications (including over the counter) to all visits with all providers. Chart notes indicate taking Scot Burn but not among her medications. She is able to tell me she picked up something at the pharmacy but cannot say what it was. She is asking if we have a medication that will make her symptoms better, but I again expressed my concern about medication errors already, possibly contributing to her symptoms. Scot Burn would obviously be a good choice for her given underlying diastolic dysfunction, and she may well be taking it. I spoke personally with Dr Luis Fernando Ching about the above issues. She agrees that patient was supposed to have been on Scot Burn with a long history of intermittent compliance and follow up, has had ongoing difficulty getting buy in with home glucose checks. She will contact patient to move follow up from April to reassess DM control and meds.           Return in about 3 months (around 5/8/2023). Thank you for allowing me to participate in this patient's care. Please call or contact me if there are any questions or concerns regarding the above.       Prabha Manrique MD  02/08/23  9:38 AM

## 2023-02-08 ENCOUNTER — OFFICE VISIT (OUTPATIENT)
Dept: CARDIOLOGY CLINIC | Age: 78
End: 2023-02-08
Payer: MEDICARE

## 2023-02-08 VITALS
BODY MASS INDEX: 29.6 KG/M2 | DIASTOLIC BLOOD PRESSURE: 60 MMHG | WEIGHT: 156.8 LBS | SYSTOLIC BLOOD PRESSURE: 118 MMHG | HEIGHT: 61 IN | HEART RATE: 64 BPM

## 2023-02-08 DIAGNOSIS — I25.118 CORONARY ARTERY DISEASE OF NATIVE ARTERY OF NATIVE HEART WITH STABLE ANGINA PECTORIS (HCC): Primary | ICD-10-CM

## 2023-02-08 DIAGNOSIS — E78.00 PURE HYPERCHOLESTEROLEMIA: ICD-10-CM

## 2023-02-08 DIAGNOSIS — E11.9 TYPE 2 DIABETES MELLITUS WITHOUT COMPLICATION, WITH LONG-TERM CURRENT USE OF INSULIN (HCC): ICD-10-CM

## 2023-02-08 DIAGNOSIS — Z79.4 TYPE 2 DIABETES MELLITUS WITHOUT COMPLICATION, WITH LONG-TERM CURRENT USE OF INSULIN (HCC): ICD-10-CM

## 2023-02-08 DIAGNOSIS — I10 ESSENTIAL HYPERTENSION: ICD-10-CM

## 2023-02-08 PROCEDURE — 1036F TOBACCO NON-USER: CPT | Performed by: INTERNAL MEDICINE

## 2023-02-08 PROCEDURE — G8427 DOCREV CUR MEDS BY ELIG CLIN: HCPCS | Performed by: INTERNAL MEDICINE

## 2023-02-08 PROCEDURE — 1090F PRES/ABSN URINE INCON ASSESS: CPT | Performed by: INTERNAL MEDICINE

## 2023-02-08 PROCEDURE — 3078F DIAST BP <80 MM HG: CPT | Performed by: INTERNAL MEDICINE

## 2023-02-08 PROCEDURE — 99214 OFFICE O/P EST MOD 30 MIN: CPT | Performed by: INTERNAL MEDICINE

## 2023-02-08 PROCEDURE — G8484 FLU IMMUNIZE NO ADMIN: HCPCS | Performed by: INTERNAL MEDICINE

## 2023-02-08 PROCEDURE — G8417 CALC BMI ABV UP PARAM F/U: HCPCS | Performed by: INTERNAL MEDICINE

## 2023-02-08 PROCEDURE — 3074F SYST BP LT 130 MM HG: CPT | Performed by: INTERNAL MEDICINE

## 2023-02-08 PROCEDURE — 1123F ACP DISCUSS/DSCN MKR DOCD: CPT | Performed by: INTERNAL MEDICINE

## 2023-02-08 PROCEDURE — G8400 PT W/DXA NO RESULTS DOC: HCPCS | Performed by: INTERNAL MEDICINE

## 2023-02-08 ASSESSMENT — ENCOUNTER SYMPTOMS: SHORTNESS OF BREATH: 1

## 2023-02-08 NOTE — PATIENT INSTRUCTIONS
Check your blood sugar 4 times a day until you follow up with Dr Katarzyna Hugo - before each meal and at bed time. Take your blood sugar diary and ALL your medications with you to every visit.

## 2023-05-08 NOTE — PROGRESS NOTES
1973    wrist    GYN  1997    vaginal repair    HERNIA REPAIR  1997    HYSTERECTOM[facility] (CERVIX STATUS [facility]      ORTHOPEDIC SURGERY  1985    ankle    TUBAL LIGATION  1976    bilateral    UROLOGICAL SURGERY  1997    bladder tack     Family History   Problem Relation Age of Onset    Diabetes Mother     Coronary Art Dis Father     Diabetes Sister     Coronary Art Dis Sister     Colon Polyps Mother     Coronary Art Dis Brother     Diabetes Brother     Breast Cancer Paternal Aunt 67     Social History     Tobacco Use    Smoking status: Never     Passive exposure: Yes    Smokeless tobacco: Never   Substance Use Topics    Alcohol use: No     Alcohol/week: 0.0 standard drinks       ROS:    Review of Systems   Cardiovascular:  Negative for chest pain, leg swelling, near-syncope and palpitations. Respiratory:  Negative for shortness of breath. PHYSICAL EXAM:   /66   Pulse 81   Ht 5' 1\" (1.549 m)   Wt 150 lb 12.8 oz (68.4 kg)   BMI 28.49 kg/m²      Wt Readings from Last 3 Encounters:   05/10/23 150 lb 12.8 oz (68.4 kg)   02/08/23 156 lb 12.8 oz (71.1 kg)   01/27/23 152 lb (68.9 kg)     BP Readings from Last 3 Encounters:   05/10/23 122/66   02/08/23 118/60   01/27/23 (!) 150/80     Pulse Readings from Last 3 Encounters:   05/10/23 81   02/08/23 64   01/25/23 68           Physical Exam  Constitutional:       Appearance: Normal appearance. HENT:      Head: Normocephalic and atraumatic. Eyes:      Conjunctiva/sclera: Conjunctivae normal.   Neck:      Vascular: No carotid bruit. Cardiovascular:      Rate and Rhythm: Normal rate and regular rhythm. Heart sounds: No murmur heard. No friction rub. No gallop. Pulmonary:      Effort: No respiratory distress. Breath sounds: No wheezing or rales. Musculoskeletal:         General: No swelling. Cervical back: Neck supple. Skin:     General: Skin is warm and dry. Neurological:      General: No focal deficit present.

## 2023-05-10 ENCOUNTER — OFFICE VISIT (OUTPATIENT)
Age: 78
End: 2023-05-10
Payer: MEDICARE

## 2023-05-10 VITALS
WEIGHT: 150.8 LBS | DIASTOLIC BLOOD PRESSURE: 66 MMHG | HEIGHT: 61 IN | BODY MASS INDEX: 28.47 KG/M2 | HEART RATE: 81 BPM | SYSTOLIC BLOOD PRESSURE: 122 MMHG

## 2023-05-10 DIAGNOSIS — E78.00 PURE HYPERCHOLESTEROLEMIA: ICD-10-CM

## 2023-05-10 DIAGNOSIS — I10 ESSENTIAL HYPERTENSION: ICD-10-CM

## 2023-05-10 DIAGNOSIS — I47.1 PSVT (PAROXYSMAL SUPRAVENTRICULAR TACHYCARDIA) (HCC): ICD-10-CM

## 2023-05-10 DIAGNOSIS — Z95.1 S/P CABG X 3: ICD-10-CM

## 2023-05-10 DIAGNOSIS — I25.118 CORONARY ARTERY DISEASE OF NATIVE ARTERY OF NATIVE HEART WITH STABLE ANGINA PECTORIS (HCC): Primary | ICD-10-CM

## 2023-05-10 PROBLEM — I47.10 PSVT (PAROXYSMAL SUPRAVENTRICULAR TACHYCARDIA): Status: ACTIVE | Noted: 2023-05-10

## 2023-05-10 PROCEDURE — 3078F DIAST BP <80 MM HG: CPT | Performed by: INTERNAL MEDICINE

## 2023-05-10 PROCEDURE — G8399 PT W/DXA RESULTS DOCUMENT: HCPCS | Performed by: INTERNAL MEDICINE

## 2023-05-10 PROCEDURE — 3074F SYST BP LT 130 MM HG: CPT | Performed by: INTERNAL MEDICINE

## 2023-05-10 PROCEDURE — G8417 CALC BMI ABV UP PARAM F/U: HCPCS | Performed by: INTERNAL MEDICINE

## 2023-05-10 PROCEDURE — 1036F TOBACCO NON-USER: CPT | Performed by: INTERNAL MEDICINE

## 2023-05-10 PROCEDURE — 99214 OFFICE O/P EST MOD 30 MIN: CPT | Performed by: INTERNAL MEDICINE

## 2023-05-10 PROCEDURE — 1090F PRES/ABSN URINE INCON ASSESS: CPT | Performed by: INTERNAL MEDICINE

## 2023-05-10 PROCEDURE — 1123F ACP DISCUSS/DSCN MKR DOCD: CPT | Performed by: INTERNAL MEDICINE

## 2023-05-10 PROCEDURE — G8427 DOCREV CUR MEDS BY ELIG CLIN: HCPCS | Performed by: INTERNAL MEDICINE

## 2023-05-10 ASSESSMENT — ENCOUNTER SYMPTOMS: SHORTNESS OF BREATH: 0

## 2023-05-10 NOTE — PATIENT INSTRUCTIONS
will take medicines that help prevent a heart attack. Your doctor may suggest a procedure to open narrowed or blocked arteries. This is called angioplasty. Or your doctor may suggest using healthy blood vessels to create detours around narrowed or blocked arteries. This is called bypass surgery. Follow-up care is a key part of your treatment and safety. Be sure to make and go to all appointments, and call your doctor if you are having problems. It's also a good idea to know your test results and keep a list of the medicines you take. Where can you learn more? Go to http://www.woods.com/ and enter C643 to learn more about \"Learning About Coronary Artery Disease (CAD). \"  Current as of: September 7, 2022               Content Version: 13.6  © 2006-2023 Healthwise[employer] Incorporated. Care instructions adapted under license by [facility] ([facility]). If you have questions about a medical condition or this instruction, always ask your healthcare professional. [place] [address] any warranty or liability for your use of this information. Please visit [email or web address]. org for more information regarding cardiovascular disease prevention and treatment.

## 2023-05-11 LAB
CHOLEST SERPL-MCNC: 125 MG/DL
HDLC SERPL-MCNC: 39 MG/DL (ref 40–60)
HDLC SERPL: 3.2
LDLC SERPL CALC-MCNC: 41.2 MG/DL
TRIGL SERPL-MCNC: 224 MG/DL (ref 35–150)
VLDLC SERPL CALC-MCNC: 44.8 MG/DL (ref 6–23)

## 2024-01-17 NOTE — PROGRESS NOTES
[facility] CARDIOLOGY  [address] [place] [address], [address]  [place] [address]  PHONE: [phone]      24    NAME:  Marce Noe  : 1945  MRN: [id number]         SUBJECTIVE:   Marce Noe is a 78 y.o. female seen for a follow up visit regarding the following:     Chief Complaint   Patient presents with    Coronary Artery Disease    Hypertension            HPI:  Follow up  Coronary Artery Disease and Hypertension   .    Follow up CAD/CABG  . Prior SVT in setting of metabolic derangement, poorly controlled DM.  Recent hip fracture with admission and surgery at [facility], was having some tachycardia during that time.  She got tangled up in her dog's leash.  She had ORIF with femoral xavier.  Her leg is obviously sore but otherwise doing ok.  She brought a bag of medicines with her today, but there are still significant discrepancies with our list, her bag, the [facility] list and her pharmacy list.      She remembers having had one episode of palpitations while in rehab.  She remembers being told her heart rate got very low and they were almost going to call the ambulance.  Metoprolol is no longer in her bag but still on her list. Jardiance not in her bag, but on her list, she thinks perhaps that's the one she couldn't afford the 40 dollar copay for.  She initially requested a refill on furosemide but that appears on neither list and she has a history of SVT d/t metabolic derangement.  She has 2 bottles of iron - one Rx and one OTC, unsure if she's taking both.     Doesn't seem to have anginal quality chest pain, states she sometimes has some sharp pains.         No information from rehab facility to determine if they stopped it.  Since then only rare episodes and they've been brief.        Past cardiac history:   ASA allergy    - cardiac cath, no coronary obstruction, details not available    - normal perfusion scan   Echo normal SF, moderate MR   Oct 2016 - Echo EF 60-65%,

## 2024-01-18 ENCOUNTER — OFFICE VISIT (OUTPATIENT)
Age: 79
End: 2024-01-18
Payer: COMMERCIAL

## 2024-01-18 VITALS
DIASTOLIC BLOOD PRESSURE: 62 MMHG | SYSTOLIC BLOOD PRESSURE: 118 MMHG | HEART RATE: 81 BPM | BODY MASS INDEX: 27.79 KG/M2 | WEIGHT: 147.2 LBS | HEIGHT: 61 IN

## 2024-01-18 DIAGNOSIS — Z79.4 TYPE 2 DIABETES MELLITUS WITHOUT COMPLICATION, WITH LONG-TERM CURRENT USE OF INSULIN (HCC): ICD-10-CM

## 2024-01-18 DIAGNOSIS — I49.3 PVC'S (PREMATURE VENTRICULAR CONTRACTIONS): ICD-10-CM

## 2024-01-18 DIAGNOSIS — R06.02 SHORTNESS OF BREATH: ICD-10-CM

## 2024-01-18 DIAGNOSIS — I10 PRIMARY HYPERTENSION: ICD-10-CM

## 2024-01-18 DIAGNOSIS — E11.9 TYPE 2 DIABETES MELLITUS WITHOUT COMPLICATION, WITH LONG-TERM CURRENT USE OF INSULIN (HCC): ICD-10-CM

## 2024-01-18 DIAGNOSIS — I47.10 PSVT (PAROXYSMAL SUPRAVENTRICULAR TACHYCARDIA): ICD-10-CM

## 2024-01-18 DIAGNOSIS — I25.118 CORONARY ARTERY DISEASE OF NATIVE ARTERY OF NATIVE HEART WITH STABLE ANGINA PECTORIS (HCC): Primary | ICD-10-CM

## 2024-01-18 PROCEDURE — 3078F DIAST BP <80 MM HG: CPT | Performed by: INTERNAL MEDICINE

## 2024-01-18 PROCEDURE — 93000 ELECTROCARDIOGRAM COMPLETE: CPT | Performed by: INTERNAL MEDICINE

## 2024-01-18 PROCEDURE — 1123F ACP DISCUSS/DSCN MKR DOCD: CPT | Performed by: INTERNAL MEDICINE

## 2024-01-18 PROCEDURE — 99214 OFFICE O/P EST MOD 30 MIN: CPT | Performed by: INTERNAL MEDICINE

## 2024-01-18 PROCEDURE — 3074F SYST BP LT 130 MM HG: CPT | Performed by: INTERNAL MEDICINE

## 2024-01-18 RX ORDER — FUROSEMIDE 20 MG/1
20 TABLET ORAL DAILY PRN
Qty: 60 TABLET | Refills: 0 | Status: CANCELLED | OUTPATIENT
Start: 2024-01-18

## 2024-01-18 RX ORDER — AMLODIPINE BESYLATE 2.5 MG/1
2.5 TABLET ORAL DAILY
COMMUNITY

## 2024-01-26 ENCOUNTER — TELEPHONE (OUTPATIENT)
Age: 79
End: 2024-01-26

## 2024-01-26 NOTE — TELEPHONE ENCOUNTER
----- Message from Deb Sky MD sent at 1/26/2024  8:11 AM EST -----  Please notify patient, monitor continues to show some episodes of SVT, along with occasional isolated PVC's.  There is no slow heart rate, which she had reported as a concern.  Please have her resume metoprolol succinate 25 mg QD and keep follow up next month.

## 2024-02-13 ENCOUNTER — TELEPHONE (OUTPATIENT)
Age: 79
End: 2024-02-13

## 2024-02-13 NOTE — PROGRESS NOTES
NST - small, mild reversible apical defect       Echo - EF 60-65%, normal DF, no significant valve disease, normal RVSP  Jan 2023      7 day monitor- NSR Several brief runs of narrow complex, short R-P tachycardia, overall burden 2%, rates to 180.  No diary entries but states she did have symptoms       Echo - EF 66%, mild LVH, abn DF, E/e' 12, mild AI, mild/mod MR, RVSP 42, mod LAE       NST - normal perfusion and LVEF  Jan 2024       6 day monitor.  NSR with occasional PAC/PVC (6 and 5% burden, respectively).  Several runs of narrow complex, long R-P tachycardia 160 bpm.  Longest 4 minutes.  Interestingly, patient reports symptoms only with isolated ectopy.  SVT runs are asymptomatic       Echo - EF 57%, mild LVH, abn DF, mild MR, mod TR, RVSP 64, LAE                              Key CAD CHF Meds            metoprolol succinate (TOPROL XL) 50 MG extended release tablet (Taking)    Sig - Route: Take 1 tablet by mouth daily - Oral    amLODIPine (NORVASC) 2.5 MG tablet (Taking)    Class: Historical Med    atorvastatin (LIPITOR) 80 MG tablet (Taking)    Class: Historical Med    furosemide (LASIX) 20 MG tablet (Taking)    Class: Historical Med          Key Antihyperglycemic Medications            Insulin Degludec (TRESIBA FLEXTOUCH) 100 UNIT/ML SOPN (Taking)    Class: Historical Med    metFORMIN (GLUCOPHAGE) 1000 MG tablet (Taking)    Class: Historical Med                Past Medical History, Past Surgical History, Family history, Social History, and Medications were all reviewed with the patient today and updated as necessary.     Prior to Admission medications    Medication Sig Start Date End Date Taking? Authorizing Provider   clopidogrel (PLAVIX) 75 MG tablet Take 1 tablet by mouth daily 2/14/24  Yes Deb kSy MD   metoprolol succinate (TOPROL XL) 50 MG extended release tablet Take 1 tablet by mouth daily 2/14/24  Yes Deb Sky MD   amLODIPine (NORVASC) 2.5 MG tablet Take 1 tablet by

## 2024-02-13 NOTE — TELEPHONE ENCOUNTER
IF you're able to get in touch with patient today I would like her to have a PE protocol chest CT before her visit tomorrow.  If you can't reach her or she can't get it done today I will order at tomorrow's visit.  At issue is she had recent hip surgery and her echo shows normal heart function but elevated pulmonary artery pressure, one reason for which could be a blood clot.  Thanks

## 2024-02-14 ENCOUNTER — TELEPHONE (OUTPATIENT)
Age: 79
End: 2024-02-14

## 2024-02-14 ENCOUNTER — OFFICE VISIT (OUTPATIENT)
Age: 79
End: 2024-02-14
Payer: COMMERCIAL

## 2024-02-14 ENCOUNTER — HOSPITAL ENCOUNTER (OUTPATIENT)
Dept: CT IMAGING | Age: 79
Discharge: HOME | End: 2024-02-17
Attending: INTERNAL MEDICINE
Payer: COMMERCIAL

## 2024-02-14 VITALS
DIASTOLIC BLOOD PRESSURE: 60 MMHG | HEART RATE: 56 BPM | SYSTOLIC BLOOD PRESSURE: 138 MMHG | WEIGHT: 149 LBS | BODY MASS INDEX: 28.13 KG/M2 | HEIGHT: 61 IN

## 2024-02-14 DIAGNOSIS — I27.20 PULMONARY HYPERTENSION (HCC): ICD-10-CM

## 2024-02-14 DIAGNOSIS — I10 HTN (HYPERTENSION): Primary | ICD-10-CM

## 2024-02-14 DIAGNOSIS — R06.02 SHORTNESS OF BREATH: ICD-10-CM

## 2024-02-14 DIAGNOSIS — I47.10 PSVT (PAROXYSMAL SUPRAVENTRICULAR TACHYCARDIA): ICD-10-CM

## 2024-02-14 DIAGNOSIS — I27.20 PULMONARY HYPERTENSION (HCC): Primary | ICD-10-CM

## 2024-02-14 DIAGNOSIS — I25.118 CORONARY ARTERY DISEASE OF NATIVE ARTERY OF NATIVE HEART WITH STABLE ANGINA PECTORIS (HCC): ICD-10-CM

## 2024-02-14 DIAGNOSIS — I10 ESSENTIAL HYPERTENSION: ICD-10-CM

## 2024-02-14 LAB — NT PRO BNP: 1865 PG/ML

## 2024-02-14 PROCEDURE — 1123F ACP DISCUSS/DSCN MKR DOCD: CPT | Performed by: INTERNAL MEDICINE

## 2024-02-14 PROCEDURE — 3078F DIAST BP <80 MM HG: CPT | Performed by: INTERNAL MEDICINE

## 2024-02-14 PROCEDURE — 6360000004 HC RX CONTRAST MEDICATION: Performed by: INTERNAL MEDICINE

## 2024-02-14 PROCEDURE — 99214 OFFICE O/P EST MOD 30 MIN: CPT | Performed by: INTERNAL MEDICINE

## 2024-02-14 PROCEDURE — 3075F SYST BP GE 130 - 139MM HG: CPT | Performed by: INTERNAL MEDICINE

## 2024-02-14 PROCEDURE — 71260 CT THORAX DX C+: CPT

## 2024-02-14 RX ORDER — METOPROLOL SUCCINATE 50 MG/1
50 TABLET, EXTENDED RELEASE ORAL DAILY
Qty: 90 TABLET | Refills: 3 | Status: SHIPPED | OUTPATIENT
Start: 2024-02-14

## 2024-02-14 RX ORDER — CLOPIDOGREL BISULFATE 75 MG/1
75 TABLET ORAL DAILY
Qty: 90 TABLET | Refills: 3 | Status: SHIPPED | OUTPATIENT
Start: 2024-02-14

## 2024-02-14 RX ADMIN — IOPAMIDOL 75 ML: 755 INJECTION, SOLUTION INTRAVENOUS at 10:42

## 2024-02-14 NOTE — TELEPHONE ENCOUNTER
Spoke with patient, Dr. Quispe's results given and instructions. Patient voiced understanding. Has enough lasix for now, needs prescription for potassium. Prescription sent to pharmacy orders entered for bmp//brendab  Requested Prescriptions     Pending Prescriptions Disp Refills    potassium chloride (KLOR-CON M) 20 MEQ extended release tablet 30 tablet 5     Sig: Take 1 tablet by mouth daily     ----- Message from Deb Sky MD sent at 2/14/2024 11:40 AM EST -----  Please notify patient CT scan looks ok, no blood clot.  She is carrying a little extra fluid so please increase the lasix from 20 to 40 mg daily, add 20 meq KCL and please get a bmp in 2 weeks.

## 2024-02-14 NOTE — PATIENT INSTRUCTIONS
1. Weigh daily after arising from bed and emptying your bladder  2. Keep a diary of your daily weights  3. Notify me if your weight increases by 2 lbs in 24 hours or 5 lbs in one week, as this may be an early indicator of increased fluid.'

## 2024-02-15 RX ORDER — POTASSIUM CHLORIDE 20 MEQ/1
20 TABLET, EXTENDED RELEASE ORAL DAILY
Qty: 30 TABLET | Refills: 5 | Status: SHIPPED | OUTPATIENT
Start: 2024-02-15

## 2024-02-22 NOTE — PROGRESS NOTES
[facility] CARDIOLOGY  [address] [place] [address], [address]  [place] [address]  PHONE: [phone]      24    NAME:  Marce Noe  : 1945  MRN: [id number]         SUBJECTIVE:   Marce Noe is a 78 y.o. female seen for a follow up visit regarding the following:     Chief Complaint   Patient presents with    Results    Hypertension            HPI:  Follow up  Results and Hypertension   .    Follow up CAD/CABG  . Prior SVT in setting of metabolic derangement, poorly controlled DM, recent ORIF hip fracture, stay in rehab, see last note regarding ongoing confusion regarding some of her medications.   CT was negative for PE but did show some signs of volume overload, started her on low dose lasix and potassium.  NT pro BNP .     Edema is a little bit better with lasix.  She's not taking potassium stating the pharmacy didn't fill it, but we sent the Rx the same day we sent the lasix.  She only just started the lasix yesterday because she didn't have the money.  She didn't notify us of any of this.  When she picked up the lasix yesterday it was much less expensive than they originally quoted.  Now she says her son gave her his own lasix 20 mg and she's been taking 40 mg of those.  She's lost close to 10 lbs of fluid on her home scale with a dry weight of 140 lb.  She's been tired and a little dizzy with some left sided headache for the last few days, she is congested with runny nose.               Past cardiac history:   ASA allergy    - cardiac cath, no coronary obstruction, details not available    - normal perfusion scan   Echo normal SF, moderate MR   Oct 2016 - Echo EF 60-65%, impaired relaxation, mild MR, RVSP 31   Sep 2019- small reversible anteroapical ischemia, preserved EF   LIMA to LAD, reverse SVG to OM1, and reverse SVG to OM2   Aug 2022       [facility] - NST - small, mild reversible apical defect       Echo - EF 60-65%, normal DF, no significant valve disease,

## 2024-02-23 ENCOUNTER — OFFICE VISIT (OUTPATIENT)
Age: 79
End: 2024-02-23
Payer: COMMERCIAL

## 2024-02-23 VITALS
HEART RATE: 40 BPM | SYSTOLIC BLOOD PRESSURE: 100 MMHG | HEIGHT: 61 IN | DIASTOLIC BLOOD PRESSURE: 50 MMHG | BODY MASS INDEX: 27.56 KG/M2 | WEIGHT: 146 LBS

## 2024-02-23 DIAGNOSIS — I89.0 LYMPHEDEMA: ICD-10-CM

## 2024-02-23 DIAGNOSIS — I25.118 CORONARY ARTERY DISEASE OF NATIVE ARTERY OF NATIVE HEART WITH STABLE ANGINA PECTORIS (HCC): Primary | ICD-10-CM

## 2024-02-23 DIAGNOSIS — I10 HTN (HYPERTENSION): ICD-10-CM

## 2024-02-23 DIAGNOSIS — E11.9 TYPE 2 DIABETES MELLITUS WITHOUT COMPLICATION, WITH LONG-TERM CURRENT USE OF INSULIN (HCC): ICD-10-CM

## 2024-02-23 DIAGNOSIS — I47.10 PSVT (PAROXYSMAL SUPRAVENTRICULAR TACHYCARDIA): ICD-10-CM

## 2024-02-23 DIAGNOSIS — Z79.4 TYPE 2 DIABETES MELLITUS WITHOUT COMPLICATION, WITH LONG-TERM CURRENT USE OF INSULIN (HCC): ICD-10-CM

## 2024-02-23 DIAGNOSIS — I50.33 ACUTE ON CHRONIC HEART FAILURE WITH PRESERVED EJECTION FRACTION (HFPEF) (HCC): ICD-10-CM

## 2024-02-23 DIAGNOSIS — Z95.1 S/P CABG X 3: ICD-10-CM

## 2024-02-23 LAB
ANION GAP SERPL CALC-SCNC: 6 MMOL/L (ref 2–11)
BUN SERPL-MCNC: 13 MG/DL (ref 8–23)
CALCIUM SERPL-MCNC: 9.7 MG/DL (ref 8.3–10.4)
CHLORIDE SERPL-SCNC: 103 MMOL/L (ref 103–113)
CO2 SERPL-SCNC: 32 MMOL/L (ref 21–32)
CREAT SERPL-MCNC: 0.8 MG/DL (ref 0.6–1)
GLUCOSE SERPL-MCNC: 212 MG/DL (ref 65–100)
POTASSIUM SERPL-SCNC: 4.1 MMOL/L (ref 3.5–5.1)
SODIUM SERPL-SCNC: 141 MMOL/L (ref 136–146)

## 2024-02-23 PROCEDURE — 1123F ACP DISCUSS/DSCN MKR DOCD: CPT | Performed by: INTERNAL MEDICINE

## 2024-02-23 PROCEDURE — 99214 OFFICE O/P EST MOD 30 MIN: CPT | Performed by: INTERNAL MEDICINE

## 2024-02-23 PROCEDURE — 3078F DIAST BP <80 MM HG: CPT | Performed by: INTERNAL MEDICINE

## 2024-02-23 PROCEDURE — 3074F SYST BP LT 130 MM HG: CPT | Performed by: INTERNAL MEDICINE

## 2024-02-23 RX ORDER — POTASSIUM CHLORIDE 20 MEQ/1
20 TABLET, EXTENDED RELEASE ORAL DAILY
Qty: 30 TABLET | Refills: 5 | Status: SHIPPED | OUTPATIENT
Start: 2024-02-23

## 2024-02-23 RX ORDER — FEXOFENADINE HCL 180 MG/1
180 TABLET ORAL DAILY
COMMUNITY

## 2024-02-23 RX ORDER — METOPROLOL SUCCINATE 50 MG/1
25 TABLET, EXTENDED RELEASE ORAL DAILY
Qty: 90 TABLET | Refills: 3 | COMMUNITY
Start: 2024-02-23

## 2024-02-23 ASSESSMENT — ENCOUNTER SYMPTOMS: SHORTNESS OF BREATH: 1

## 2024-04-04 PROBLEM — I49.5 SINUS NODE DYSFUNCTION (HCC): Status: ACTIVE | Noted: 2024-04-04

## 2024-04-04 NOTE — PROGRESS NOTES
[facility] CARDIOLOGY  [address] [place] [address], [address]  [place] [address]  PHONE: [phone]      24    NAME:  Marce Noe  : 1945  MRN: [id number]         SUBJECTIVE:   Marce Noe is a 78 y.o. female seen for a follow up visit regarding the following:     Chief Complaint   Patient presents with    Follow-up     6 weeks    Coronary Artery Disease    Hyperlipidemia            HPI:  Follow up  Follow-up (6 weeks), Coronary Artery Disease, and Hyperlipidemia   .    Follow up CAD/CABG  . Prior SVT in setting of metabolic derangement, poorly controlled DM,lymphedema bradycardia.  BB reduced to 25 mg last visit to reassess heart rate and consider monitor with plans for EP consultation for PSVT breakthrough. Needs lipid profile, Longstanding difficulty confirming her med list, even when she brings her bottles, see prior notes.  Once again, there's no amlodipine in her bag, she says she's not sure, she thinks she put it in her pill box at home. Amlodipine is on her list at [facility] as having been ordered in November so she should have refills.  She saw ER for RLE cellulitis, leg is wrapped, she got abx, and wound care nurse is to begin coming to her home.  Her BP this morning is low.  She has been taking oxycodone for sciatica and she hasn't eaten this morning but did take her meds.  Has lost ~ 7 lbs over the last week.  Eating poorly in general, no appetite.  Hasn't felt dizzy, light headed, says she feels the same.  Has had no palpitations on lower dose BB.             Past cardiac history:   ASA allergy    - cardiac cath, no coronary obstruction, details not available    - normal perfusion scan   Echo normal SF, moderate MR   Oct 2016 - Echo EF 60-65%, impaired relaxation, mild MR, RVSP 31   Sep 2019- small reversible anteroapical ischemia, preserved EF   LIMA to LAD, reverse SVG to OM1, and reverse SVG to OM2   Aug 2022       [facility] - NST - small, mild reversible

## 2024-04-05 ENCOUNTER — OFFICE VISIT (OUTPATIENT)
Age: 79
End: 2024-04-05
Payer: COMMERCIAL

## 2024-04-05 VITALS
WEIGHT: 133 LBS | BODY MASS INDEX: 25.11 KG/M2 | HEART RATE: 68 BPM | DIASTOLIC BLOOD PRESSURE: 60 MMHG | HEIGHT: 61 IN | SYSTOLIC BLOOD PRESSURE: 90 MMHG

## 2024-04-05 DIAGNOSIS — Z79.4 TYPE 2 DIABETES MELLITUS WITHOUT COMPLICATION, WITH LONG-TERM CURRENT USE OF INSULIN (HCC): ICD-10-CM

## 2024-04-05 DIAGNOSIS — I10 ESSENTIAL HYPERTENSION: ICD-10-CM

## 2024-04-05 DIAGNOSIS — I73.9 PERIPHERAL ARTERY DISEASE (HCC): ICD-10-CM

## 2024-04-05 DIAGNOSIS — E11.9 TYPE 2 DIABETES MELLITUS WITHOUT COMPLICATION, WITH LONG-TERM CURRENT USE OF INSULIN (HCC): ICD-10-CM

## 2024-04-05 DIAGNOSIS — I25.118 CORONARY ARTERY DISEASE OF NATIVE ARTERY OF NATIVE HEART WITH STABLE ANGINA PECTORIS (HCC): Primary | ICD-10-CM

## 2024-04-05 DIAGNOSIS — I49.5 SINUS NODE DYSFUNCTION (HCC): ICD-10-CM

## 2024-04-05 DIAGNOSIS — Z95.1 S/P CABG X 3: ICD-10-CM

## 2024-04-05 DIAGNOSIS — E78.00 PURE HYPERCHOLESTEROLEMIA: ICD-10-CM

## 2024-04-05 DIAGNOSIS — I47.10 PSVT (PAROXYSMAL SUPRAVENTRICULAR TACHYCARDIA) (HCC): ICD-10-CM

## 2024-04-05 PROCEDURE — 3074F SYST BP LT 130 MM HG: CPT | Performed by: INTERNAL MEDICINE

## 2024-04-05 PROCEDURE — 1123F ACP DISCUSS/DSCN MKR DOCD: CPT | Performed by: INTERNAL MEDICINE

## 2024-04-05 PROCEDURE — 3078F DIAST BP <80 MM HG: CPT | Performed by: INTERNAL MEDICINE

## 2024-04-05 PROCEDURE — 99214 OFFICE O/P EST MOD 30 MIN: CPT | Performed by: INTERNAL MEDICINE

## 2024-04-05 RX ORDER — CLINDAMYCIN HYDROCHLORIDE 150 MG/1
150 CAPSULE ORAL EVERY 6 HOURS
COMMUNITY
Start: 2024-04-04

## 2024-04-05 RX ORDER — AMLODIPINE BESYLATE 2.5 MG/1
2.5 TABLET ORAL DAILY
Qty: 30 TABLET | Refills: 3 | Status: CANCELLED | OUTPATIENT
Start: 2024-04-05

## 2024-04-05 ASSESSMENT — ENCOUNTER SYMPTOMS: POOR WOUND HEALING: 1

## 2024-04-25 ENCOUNTER — TELEPHONE (OUTPATIENT)
Dept: ORTHOPEDIC SURGERY | Age: 79
End: 2024-04-25

## 2024-04-25 NOTE — TELEPHONE ENCOUNTER
Pt wanting to schedule a follow up on her left femur because of some pain. She had sx with Dr. Lan in October. Please call pt.

## 2024-05-14 PROBLEM — I73.9 PERIPHERAL ARTERY DISEASE (HCC): Status: ACTIVE | Noted: 2024-05-14

## 2024-05-14 NOTE — PROGRESS NOTES
[facility] CARDIOLOGY  [address] [place] [address], [address]  [place] [address]  PHONE: [phone]      05/15/24    NAME:  Marce Noe  : 1945  MRN: [id number]         SUBJECTIVE:   Marce Noe is a 78 y.o. female seen for a follow up visit regarding the following:     Chief Complaint   Patient presents with    Follow-up    Coronary Artery Disease            HPI:  Follow up  Follow-up and Coronary Artery Disease   .    Follow up CAD/CABG  . Prior SVT in setting of metabolic derangement, poorly controlled DM,lymphedema bradycardia.  Longstanding difficulty confirming her med list, even when she brings her bottles, see prior notes. Returns to follow up evaluation revealing distal PAD of the LLE with claudication and poor wound healing.  Severe SIMI by testing at [facility] in February.  She admits she never answered the phone to start CPAP, also admits she is not eating a healthy diet.  As a result, her DM remains out of control.  No angina or palpitations.  Claudication difficult to ascertain with neuropathy symptoms, limited physical activity.   Home health wound care is coming to her home, her left leg is dressed.               Past cardiac history:   ASA allergy    - cardiac cath, no coronary obstruction, details not available    - normal perfusion scan   Echo normal SF, moderate MR   Oct 2016 - Echo EF 60-65%, impaired relaxation, mild MR, RVSP 31   Sep 2019- small reversible anteroapical ischemia, preserved EF   LIMA to LAD, reverse SVG to OM1, and reverse SVG to OM2   Aug 2022       [facility] - NST - small, mild reversible apical defect       Echo - EF 60-65%, normal DF, no significant valve disease, normal RVSP  2023      7 day monitor- NSR Several brief runs of narrow complex, short R-P tachycardia, overall burden 2%, rates to 180.  No diary entries but states she did have symptoms       Echo - EF 66%, mild LVH, abn DF, E/e' 12, mild AI, mild/mod MR, RVSP 42, mod LAE

## 2024-05-15 ENCOUNTER — OFFICE VISIT (OUTPATIENT)
Age: 79
End: 2024-05-15
Payer: MEDICARE

## 2024-05-15 VITALS
DIASTOLIC BLOOD PRESSURE: 66 MMHG | HEIGHT: 61 IN | SYSTOLIC BLOOD PRESSURE: 118 MMHG | HEART RATE: 63 BPM | WEIGHT: 145 LBS | BODY MASS INDEX: 27.38 KG/M2

## 2024-05-15 DIAGNOSIS — E11.9 TYPE 2 DIABETES MELLITUS WITHOUT COMPLICATION, WITH LONG-TERM CURRENT USE OF INSULIN (HCC): ICD-10-CM

## 2024-05-15 DIAGNOSIS — I25.118 CORONARY ARTERY DISEASE OF NATIVE ARTERY OF NATIVE HEART WITH STABLE ANGINA PECTORIS (HCC): ICD-10-CM

## 2024-05-15 DIAGNOSIS — I10 ESSENTIAL HYPERTENSION: ICD-10-CM

## 2024-05-15 DIAGNOSIS — Z79.4 TYPE 2 DIABETES MELLITUS WITHOUT COMPLICATION, WITH LONG-TERM CURRENT USE OF INSULIN (HCC): ICD-10-CM

## 2024-05-15 DIAGNOSIS — E78.00 PURE HYPERCHOLESTEROLEMIA: ICD-10-CM

## 2024-05-15 DIAGNOSIS — Z95.1 S/P CABG X 3: ICD-10-CM

## 2024-05-15 DIAGNOSIS — I49.5 SINUS NODE DYSFUNCTION (HCC): ICD-10-CM

## 2024-05-15 DIAGNOSIS — I73.9 PERIPHERAL ARTERY DISEASE (HCC): Primary | ICD-10-CM

## 2024-05-15 PROCEDURE — G8399 PT W/DXA RESULTS DOCUMENT: HCPCS | Performed by: INTERNAL MEDICINE

## 2024-05-15 PROCEDURE — 1123F ACP DISCUSS/DSCN MKR DOCD: CPT | Performed by: INTERNAL MEDICINE

## 2024-05-15 PROCEDURE — G8419 CALC BMI OUT NRM PARAM NOF/U: HCPCS | Performed by: INTERNAL MEDICINE

## 2024-05-15 PROCEDURE — 3078F DIAST BP <80 MM HG: CPT | Performed by: INTERNAL MEDICINE

## 2024-05-15 PROCEDURE — 1090F PRES/ABSN URINE INCON ASSESS: CPT | Performed by: INTERNAL MEDICINE

## 2024-05-15 PROCEDURE — 99214 OFFICE O/P EST MOD 30 MIN: CPT | Performed by: INTERNAL MEDICINE

## 2024-05-15 PROCEDURE — 3074F SYST BP LT 130 MM HG: CPT | Performed by: INTERNAL MEDICINE

## 2024-05-15 PROCEDURE — G8427 DOCREV CUR MEDS BY ELIG CLIN: HCPCS | Performed by: INTERNAL MEDICINE

## 2024-05-15 PROCEDURE — 1036F TOBACCO NON-USER: CPT | Performed by: INTERNAL MEDICINE

## 2024-05-15 RX ORDER — FLUTICASONE PROPIONATE 50 MCG
2 SPRAY, SUSPENSION (ML) NASAL DAILY
COMMUNITY
Start: 2024-01-30

## 2024-05-15 RX ORDER — GABAPENTIN 100 MG/1
CAPSULE ORAL
COMMUNITY
Start: 2024-04-08

## 2024-05-15 ASSESSMENT — ENCOUNTER SYMPTOMS
SHORTNESS OF BREATH: 0
POOR WOUND HEALING: 1

## 2024-05-15 NOTE — PATIENT INSTRUCTIONS
Please call [facility] or dr hebert and get your sleep apnea treated asap, this was very severe.     Patient Education        Learning About Peripheral Arterial Disease (PAD)  What is peripheral arterial disease?     Peripheral arterial disease (PAD) is narrowing or blockage of arteries that causes poor blood flow to your arms and legs. PAD is most common in the legs.  PAD is often caused by fatty buildup (plaque) in the arteries. Over time, plaque builds up in the walls of the arteries, including those that supply blood to your legs. This can limit blood flow to the muscles and other tissues of the legs. PAD can make it hard for you to walk. It can also lead to tissue death. Sometimes part of the leg must be removed by surgery (amputation).  If you have PAD, you're also likely to have plaque in other arteries in your body. This raises your risk of a heart attack and stroke.  Peripheral arterial disease is also called peripheral vascular disease.  What are the symptoms?  Many people who have PAD don't have symptoms.  If you have symptoms, they may include a tight, aching, or squeezing pain in your calf, thigh, or buttock. This pain is called intermittent claudication. It usually happens after you have walked a certain distance. The pain usually goes away when you stop walking.  Other symptoms may include weak or tired legs. You might have trouble walking or balancing.  If PAD gets worse, you may have other symptoms caused by poor blood flow to your legs and feet. You may have cold, tingly, weak, or numb feet or toes, sores that are slow to heal, or leg or foot pain when you're at rest. The skin on your legs or feet might change color. It may be pale, bluish, or purplish. Your skin may look shiny or have blisters.  How can you help prevent PAD?  Try to quit or cut back on using tobacco and other nicotine products. This includes smoking and vaping. Quitting smoking is one of the best things you can do to help

## 2024-05-30 ENCOUNTER — HOSPITAL ENCOUNTER (OUTPATIENT)
Dept: CT IMAGING | Age: 79
Discharge: HOME | End: 2024-05-30
Attending: INTERNAL MEDICINE
Payer: MEDICARE

## 2024-05-30 DIAGNOSIS — I73.9 PERIPHERAL ARTERY DISEASE (HCC): ICD-10-CM

## 2024-05-30 LAB — CREAT BLD-MCNC: 0.58 MG/DL (ref 0.8–1.5)

## 2024-05-30 PROCEDURE — 6360000004 HC RX CONTRAST MEDICATION: Performed by: INTERNAL MEDICINE

## 2024-05-30 PROCEDURE — 75635 CT ANGIO ABDOMINAL ARTERIES: CPT

## 2024-05-30 PROCEDURE — 82565 ASSAY OF CREATININE: CPT

## 2024-05-30 PROCEDURE — 75635 CT ANGIO ABDOMINAL ARTERIES: CPT | Performed by: RADIOLOGY

## 2024-05-30 RX ADMIN — IOPAMIDOL 100 ML: 755 INJECTION, SOLUTION INTRAVENOUS at 10:06

## 2024-05-30 NOTE — RESULT ENCOUNTER NOTE
I will send a letter to explain results since patient not on My Chart.  BP is well controlled and renal function normal, will monitor for evidence of symptomatic PATY.  Fatty liver is a call for more stringent control of her blood sugar.

## 2024-07-01 ENCOUNTER — TELEPHONE (OUTPATIENT)
Dept: INTERNAL MEDICINE CLINIC | Facility: CLINIC | Age: 79
End: 2024-07-01

## 2024-07-01 ENCOUNTER — TELEPHONE (OUTPATIENT)
Age: 79
End: 2024-07-01

## 2024-07-01 DIAGNOSIS — R00.2 PALPITATIONS: Primary | ICD-10-CM

## 2024-07-01 NOTE — TELEPHONE ENCOUNTER
Spoke to Noelle, message received by [facility] was given to the wrong office.  Message is for Dr Rossy Pace at [facility].

## 2024-07-01 NOTE — TELEPHONE ENCOUNTER
PCP faxing EKG that shows bigeminy I put her down for next available appt 07/19  Is there something she needs before then ? Please call

## 2024-07-01 NOTE — TELEPHONE ENCOUNTER
Pt informed we have not received the EKG yet. I will call her Pcp to give them the correct fax # [phone], spoke with Kong with [facility] Ernesto Duran at [phone]. Kong states it would take 24 - 48 hours to hear back from them.

## 2024-07-01 NOTE — TELEPHONE ENCOUNTER
Disregard previous note, pt's Pcp is Dr. Rossy Pace with [facility]. A vm was left for Dr. Rossy Pace's nurse to return call and ask for Noelle, and/or to fax EKG to [phone]

## 2024-07-02 NOTE — TELEPHONE ENCOUNTER
Pt reports very light occasional palpitations, however she would like to wear a monitor. Pt will come in next Wednesday 7/10/24 at 10:00 PM for monitor placement, she has a office visit on Friday 7/19/24 at 11:30 AM with Dr. Quispe.              Deb Sky MD Riddle, Gina RN  Caller: Unspecified (Yesterday, 10:37 AM)  PVC's/bigeminy. If symptomatic can have her wear 3 day monitor, otherwise monitor for symptoms

## 2024-07-23 ENCOUNTER — TELEPHONE (OUTPATIENT)
Age: 79
End: 2024-07-23

## 2024-07-23 NOTE — TELEPHONE ENCOUNTER
Attempted to speak with pt to give her the information from Dr. Quispe, no answer and vm was full.    Will try again later.

## 2024-07-23 NOTE — TELEPHONE ENCOUNTER
----- Message from Deb Sky MD sent at 7/23/2024  7:30 AM EDT -----  Pt missed her 7/19 appointment.  Monitor is showing frequent PVC's and some short runs atrial tachycardia.  No significant bradycardia (which has been a problem previously).  Please have her try to increase her metoprolol to 50 mg dose, contact us if doesn't tolerate.  Meanwhile, also please arrange echocardiogram and [facility] nuclear scan for dyspnea, chest discomfort and palpitations, with follow up after.  ER obviously if significant symptoms in the interim.

## 2024-07-24 NOTE — TELEPHONE ENCOUNTER
Pt given information from Dr. Quispe, she states understanding. Routing to Scheduling     Per Dr. Quispe: please arrange echocardiogram and marcella nuclear scan for dyspnea, chest discomfort and palpitations, with follow up after.

## 2024-09-16 ENCOUNTER — OFFICE VISIT (OUTPATIENT)
Age: 79
End: 2024-09-16

## 2024-09-16 ENCOUNTER — HOSPITAL ENCOUNTER (OUTPATIENT)
Age: 79
Setting detail: OBSERVATION
Discharge: HOME | End: 2024-09-17
Attending: INTERNAL MEDICINE | Admitting: INTERNAL MEDICINE
Payer: MEDICARE

## 2024-09-16 VITALS
WEIGHT: 139.2 LBS | BODY MASS INDEX: 27.33 KG/M2 | HEIGHT: 60 IN | SYSTOLIC BLOOD PRESSURE: 112 MMHG | DIASTOLIC BLOOD PRESSURE: 60 MMHG | HEART RATE: 50 BPM

## 2024-09-16 DIAGNOSIS — I49.5 SINUS NODE DYSFUNCTION (HCC): ICD-10-CM

## 2024-09-16 DIAGNOSIS — Z79.4 TYPE 2 DIABETES MELLITUS WITH OTHER SKIN ULCER, WITH LONG-TERM CURRENT USE OF INSULIN (HCC): ICD-10-CM

## 2024-09-16 DIAGNOSIS — I25.118 CORONARY ARTERY DISEASE OF NATIVE ARTERY OF NATIVE HEART WITH STABLE ANGINA PECTORIS (HCC): ICD-10-CM

## 2024-09-16 DIAGNOSIS — I47.10 SUPRAVENTRICULAR TACHYCARDIA (HCC): Primary | ICD-10-CM

## 2024-09-16 DIAGNOSIS — E11.622 TYPE 2 DIABETES MELLITUS WITH OTHER SKIN ULCER, WITH LONG-TERM CURRENT USE OF INSULIN (HCC): ICD-10-CM

## 2024-09-16 DIAGNOSIS — I73.9 PERIPHERAL ARTERY DISEASE (HCC): ICD-10-CM

## 2024-09-16 DIAGNOSIS — E78.00 PURE HYPERCHOLESTEROLEMIA: ICD-10-CM

## 2024-09-16 DIAGNOSIS — E83.42 HYPOMAGNESEMIA: ICD-10-CM

## 2024-09-16 DIAGNOSIS — Z95.1 S/P CABG X 3: ICD-10-CM

## 2024-09-16 DIAGNOSIS — I49.3 PVC'S (PREMATURE VENTRICULAR CONTRACTIONS): ICD-10-CM

## 2024-09-16 DIAGNOSIS — I47.10 PSVT (PAROXYSMAL SUPRAVENTRICULAR TACHYCARDIA) (HCC): Primary | ICD-10-CM

## 2024-09-16 PROBLEM — G47.33 OSA ON CPAP: Chronic | Status: ACTIVE | Noted: 2024-09-16

## 2024-09-16 PROBLEM — M54.31 SCIATICA OF RIGHT SIDE: Chronic | Status: ACTIVE | Noted: 2024-09-16

## 2024-09-16 PROBLEM — I25.10 CAD (CORONARY ARTERY DISEASE): Chronic | Status: ACTIVE | Noted: 2019-09-16

## 2024-09-16 PROBLEM — G47.33 OSA ON CPAP: Status: ACTIVE | Noted: 2024-09-16

## 2024-09-16 PROBLEM — M54.31 SCIATICA OF RIGHT SIDE: Status: ACTIVE | Noted: 2024-09-16

## 2024-09-16 LAB
ALBUMIN SERPL-MCNC: 3.3 G/DL (ref 3.2–4.6)
ALBUMIN/GLOB SERPL: 1 (ref 1–1.9)
ALP SERPL-CCNC: 88 U/L (ref 35–104)
ALT SERPL-CCNC: <5 U/L (ref 12–65)
ANION GAP SERPL CALC-SCNC: 9 MMOL/L (ref 9–18)
AST SERPL-CCNC: 18 U/L (ref 15–37)
BASOPHILS # BLD: 0 K/UL (ref 0–0.2)
BASOPHILS NFR BLD: 0 % (ref 0–2)
BILIRUB SERPL-MCNC: 0.3 MG/DL (ref 0–1.2)
BUN SERPL-MCNC: 9 MG/DL (ref 8–23)
CALCIUM SERPL-MCNC: 9.1 MG/DL (ref 8.8–10.2)
CHLORIDE SERPL-SCNC: 103 MMOL/L (ref 98–107)
CO2 SERPL-SCNC: 29 MMOL/L (ref 20–28)
CREAT SERPL-MCNC: 0.66 MG/DL (ref 0.6–1.1)
DIFFERENTIAL METHOD BLD: ABNORMAL
EKG ATRIAL RATE: 77 BPM
EKG DIAGNOSIS: NORMAL
EKG DIAGNOSIS: NORMAL
EKG P AXIS: 78 DEGREES
EKG P-R INTERVAL: 148 MS
EKG Q-T INTERVAL: 304 MS
EKG Q-T INTERVAL: 376 MS
EKG QRS DURATION: 114 MS
EKG QRS DURATION: 124 MS
EKG QTC CALCULATION (BAZETT): 425 MS
EKG QTC CALCULATION (BAZETT): 485 MS
EKG R AXIS: 57 DEGREES
EKG R AXIS: 88 DEGREES
EKG T AXIS: -44 DEGREES
EKG T AXIS: -58 DEGREES
EKG VENTRICULAR RATE: 153 BPM
EKG VENTRICULAR RATE: 77 BPM
EOSINOPHIL # BLD: 0 K/UL (ref 0–0.8)
EOSINOPHIL NFR BLD: 0 % (ref 0.5–7.8)
ERYTHROCYTE [DISTWIDTH] IN BLOOD BY AUTOMATED COUNT: 13.9 % (ref 11.9–14.6)
GLOBULIN SER CALC-MCNC: 3.3 G/DL (ref 2.3–3.5)
GLUCOSE BLD STRIP.AUTO-MCNC: 126 MG/DL (ref 65–100)
GLUCOSE BLD STRIP.AUTO-MCNC: 157 MG/DL (ref 65–100)
GLUCOSE BLD STRIP.AUTO-MCNC: 253 MG/DL (ref 65–100)
GLUCOSE SERPL-MCNC: 251 MG/DL (ref 70–99)
HCT VFR BLD AUTO: 38.8 % (ref 35.8–46.3)
HGB BLD-MCNC: 12.3 G/DL (ref 11.7–15.4)
IMM GRANULOCYTES # BLD AUTO: 0 K/UL (ref 0–0.5)
IMM GRANULOCYTES NFR BLD AUTO: 0 % (ref 0–5)
LYMPHOCYTES # BLD: 1 K/UL (ref 0.5–4.6)
LYMPHOCYTES NFR BLD: 14 % (ref 13–44)
MAGNESIUM SERPL-MCNC: 1.6 MG/DL (ref 1.8–2.4)
MCH RBC QN AUTO: 32.8 PG (ref 26.1–32.9)
MCHC RBC AUTO-ENTMCNC: 31.7 G/DL (ref 31.4–35)
MCV RBC AUTO: 103.5 FL (ref 82–102)
MONOCYTES # BLD: 1.2 K/UL (ref 0.1–1.3)
MONOCYTES NFR BLD: 16 % (ref 4–12)
NEUTS SEG # BLD: 5 K/UL (ref 1.7–8.2)
NEUTS SEG NFR BLD: 70 % (ref 43–78)
NRBC # BLD: 0 K/UL (ref 0–0.2)
PLATELET # BLD AUTO: 187 K/UL (ref 150–450)
PMV BLD AUTO: 10.9 FL (ref 9.4–12.3)
POTASSIUM SERPL-SCNC: 3.7 MMOL/L (ref 3.5–5.1)
PROT SERPL-MCNC: 6.6 G/DL (ref 6.3–8.2)
RBC # BLD AUTO: 3.75 M/UL (ref 4.05–5.2)
SERVICE CMNT-IMP: ABNORMAL
SODIUM SERPL-SCNC: 142 MMOL/L (ref 136–145)
TSH W FREE THYROID IF ABNORMAL: 0.97 UIU/ML (ref 0.27–4.2)
WBC # BLD AUTO: 7.2 K/UL (ref 4.3–11.1)

## 2024-09-16 PROCEDURE — 6360000002 HC RX W HCPCS: Performed by: INTERNAL MEDICINE

## 2024-09-16 PROCEDURE — 83735 ASSAY OF MAGNESIUM: CPT

## 2024-09-16 PROCEDURE — 94660 CPAP INITIATION&MGMT: CPT

## 2024-09-16 PROCEDURE — 94640 AIRWAY INHALATION TREATMENT: CPT

## 2024-09-16 PROCEDURE — 6360000002 HC RX W HCPCS: Performed by: NURSE PRACTITIONER

## 2024-09-16 PROCEDURE — 94761 N-INVAS EAR/PLS OXIMETRY MLT: CPT

## 2024-09-16 PROCEDURE — 36415 COLL VENOUS BLD VENIPUNCTURE: CPT

## 2024-09-16 PROCEDURE — 93010 ELECTROCARDIOGRAM REPORT: CPT | Performed by: INTERNAL MEDICINE

## 2024-09-16 PROCEDURE — G0379 DIRECT REFER HOSPITAL OBSERV: HCPCS

## 2024-09-16 PROCEDURE — 84443 ASSAY THYROID STIM HORMONE: CPT

## 2024-09-16 PROCEDURE — G0378 HOSPITAL OBSERVATION PER HR: HCPCS

## 2024-09-16 PROCEDURE — 93005 ELECTROCARDIOGRAM TRACING: CPT | Performed by: NURSE PRACTITIONER

## 2024-09-16 PROCEDURE — 99223 1ST HOSP IP/OBS HIGH 75: CPT | Performed by: INTERNAL MEDICINE

## 2024-09-16 PROCEDURE — 94760 N-INVAS EAR/PLS OXIMETRY 1: CPT

## 2024-09-16 PROCEDURE — 6370000000 HC RX 637 (ALT 250 FOR IP): Performed by: NURSE PRACTITIONER

## 2024-09-16 PROCEDURE — 85025 COMPLETE CBC W/AUTO DIFF WBC: CPT

## 2024-09-16 PROCEDURE — 82962 GLUCOSE BLOOD TEST: CPT

## 2024-09-16 PROCEDURE — 96365 THER/PROPH/DIAG IV INF INIT: CPT

## 2024-09-16 PROCEDURE — 96366 THER/PROPH/DIAG IV INF ADDON: CPT

## 2024-09-16 PROCEDURE — 80053 COMPREHEN METABOLIC PANEL: CPT

## 2024-09-16 PROCEDURE — 2580000003 HC RX 258: Performed by: NURSE PRACTITIONER

## 2024-09-16 RX ORDER — ARFORMOTEROL TARTRATE 15 UG/2ML
15 SOLUTION RESPIRATORY (INHALATION)
Status: DISCONTINUED | OUTPATIENT
Start: 2024-09-16 | End: 2024-09-17 | Stop reason: HOSPADM

## 2024-09-16 RX ORDER — INSULIN LISPRO 100 [IU]/ML
0-4 INJECTION, SOLUTION INTRAVENOUS; SUBCUTANEOUS
Status: DISCONTINUED | OUTPATIENT
Start: 2024-09-16 | End: 2024-09-17 | Stop reason: HOSPADM

## 2024-09-16 RX ORDER — POTASSIUM CHLORIDE 1500 MG/1
20 TABLET, EXTENDED RELEASE ORAL DAILY
Status: DISCONTINUED | OUTPATIENT
Start: 2024-09-16 | End: 2024-09-17 | Stop reason: HOSPADM

## 2024-09-16 RX ORDER — ONDANSETRON 2 MG/ML
4 INJECTION INTRAMUSCULAR; INTRAVENOUS EVERY 6 HOURS PRN
Status: DISCONTINUED | OUTPATIENT
Start: 2024-09-16 | End: 2024-09-17 | Stop reason: HOSPADM

## 2024-09-16 RX ORDER — GABAPENTIN 100 MG/1
100 CAPSULE ORAL 2 TIMES DAILY
Status: DISCONTINUED | OUTPATIENT
Start: 2024-09-16 | End: 2024-09-16

## 2024-09-16 RX ORDER — INSULIN GLARGINE 100 [IU]/ML
26 INJECTION, SOLUTION SUBCUTANEOUS DAILY
Status: DISCONTINUED | OUTPATIENT
Start: 2024-09-16 | End: 2024-09-16

## 2024-09-16 RX ORDER — LOPERAMIDE HCL 2 MG
2 CAPSULE ORAL
Status: COMPLETED | OUTPATIENT
Start: 2024-09-16 | End: 2024-09-16

## 2024-09-16 RX ORDER — GABAPENTIN 100 MG/1
200 CAPSULE ORAL NIGHTLY
Status: DISCONTINUED | OUTPATIENT
Start: 2024-09-16 | End: 2024-09-17 | Stop reason: HOSPADM

## 2024-09-16 RX ORDER — DILTIAZEM HYDROCHLORIDE 5 MG/ML
10 INJECTION INTRAVENOUS ONCE
Status: DISCONTINUED | OUTPATIENT
Start: 2024-09-16 | End: 2024-09-16

## 2024-09-16 RX ORDER — MAGNESIUM HYDROXIDE/ALUMINUM HYDROXICE/SIMETHICONE 120; 1200; 1200 MG/30ML; MG/30ML; MG/30ML
30 SUSPENSION ORAL EVERY 6 HOURS PRN
Status: DISCONTINUED | OUTPATIENT
Start: 2024-09-16 | End: 2024-09-17 | Stop reason: HOSPADM

## 2024-09-16 RX ORDER — BUDESONIDE 0.5 MG/2ML
0.25 INHALANT ORAL
Status: DISCONTINUED | OUTPATIENT
Start: 2024-09-16 | End: 2024-09-17 | Stop reason: HOSPADM

## 2024-09-16 RX ORDER — LANOLIN ALCOHOL/MO/W.PET/CERES
5 CREAM (GRAM) TOPICAL NIGHTLY PRN
Status: DISCONTINUED | OUTPATIENT
Start: 2024-09-16 | End: 2024-09-17 | Stop reason: HOSPADM

## 2024-09-16 RX ORDER — FLUTICASONE PROPIONATE 50 MCG
2 SPRAY, SUSPENSION (ML) NASAL DAILY
Status: DISCONTINUED | OUTPATIENT
Start: 2024-09-16 | End: 2024-09-17 | Stop reason: HOSPADM

## 2024-09-16 RX ORDER — SODIUM CHLORIDE 9 MG/ML
INJECTION, SOLUTION INTRAVENOUS PRN
Status: DISCONTINUED | OUTPATIENT
Start: 2024-09-16 | End: 2024-09-17 | Stop reason: HOSPADM

## 2024-09-16 RX ORDER — FERROUS SULFATE 325(65) MG
325 TABLET ORAL
Status: DISCONTINUED | OUTPATIENT
Start: 2024-09-17 | End: 2024-09-17 | Stop reason: HOSPADM

## 2024-09-16 RX ORDER — INSULIN LISPRO 100 [IU]/ML
0-4 INJECTION, SOLUTION INTRAVENOUS; SUBCUTANEOUS NIGHTLY
Status: DISCONTINUED | OUTPATIENT
Start: 2024-09-16 | End: 2024-09-17 | Stop reason: HOSPADM

## 2024-09-16 RX ORDER — INSULIN GLARGINE 100 [IU]/ML
30 INJECTION, SOLUTION SUBCUTANEOUS NIGHTLY
Status: DISCONTINUED | OUTPATIENT
Start: 2024-09-17 | End: 2024-09-17

## 2024-09-16 RX ORDER — SODIUM CHLORIDE 0.9 % (FLUSH) 0.9 %
5-40 SYRINGE (ML) INJECTION PRN
Status: DISCONTINUED | OUTPATIENT
Start: 2024-09-16 | End: 2024-09-17 | Stop reason: HOSPADM

## 2024-09-16 RX ORDER — MAGNESIUM SULFATE IN WATER 40 MG/ML
4000 INJECTION, SOLUTION INTRAVENOUS ONCE
Status: COMPLETED | OUTPATIENT
Start: 2024-09-16 | End: 2024-09-16

## 2024-09-16 RX ORDER — IBUPROFEN 600 MG/1
1 TABLET ORAL PRN
Status: DISCONTINUED | OUTPATIENT
Start: 2024-09-16 | End: 2024-09-17 | Stop reason: HOSPADM

## 2024-09-16 RX ORDER — ONDANSETRON 4 MG/1
4 TABLET, ORALLY DISINTEGRATING ORAL EVERY 8 HOURS PRN
Status: DISCONTINUED | OUTPATIENT
Start: 2024-09-16 | End: 2024-09-17 | Stop reason: HOSPADM

## 2024-09-16 RX ORDER — CLOPIDOGREL BISULFATE 75 MG/1
75 TABLET ORAL DAILY
Status: DISCONTINUED | OUTPATIENT
Start: 2024-09-16 | End: 2024-09-17 | Stop reason: HOSPADM

## 2024-09-16 RX ORDER — DEXTROSE MONOHYDRATE 100 MG/ML
INJECTION, SOLUTION INTRAVENOUS CONTINUOUS PRN
Status: DISCONTINUED | OUTPATIENT
Start: 2024-09-16 | End: 2024-09-17 | Stop reason: HOSPADM

## 2024-09-16 RX ORDER — ATORVASTATIN CALCIUM 80 MG/1
80 TABLET, FILM COATED ORAL DAILY
Status: DISCONTINUED | OUTPATIENT
Start: 2024-09-16 | End: 2024-09-17 | Stop reason: HOSPADM

## 2024-09-16 RX ORDER — PANTOPRAZOLE SODIUM 40 MG/1
40 TABLET, DELAYED RELEASE ORAL 2 TIMES DAILY
Status: DISCONTINUED | OUTPATIENT
Start: 2024-09-16 | End: 2024-09-17 | Stop reason: HOSPADM

## 2024-09-16 RX ORDER — METOPROLOL TARTRATE 1 MG/ML
5 INJECTION, SOLUTION INTRAVENOUS EVERY 6 HOURS PRN
Status: DISCONTINUED | OUTPATIENT
Start: 2024-09-16 | End: 2024-09-17 | Stop reason: HOSPADM

## 2024-09-16 RX ORDER — GABAPENTIN 100 MG/1
100 CAPSULE ORAL 2 TIMES DAILY
Status: DISCONTINUED | OUTPATIENT
Start: 2024-09-16 | End: 2024-09-17 | Stop reason: HOSPADM

## 2024-09-16 RX ORDER — SODIUM CHLORIDE 0.9 % (FLUSH) 0.9 %
5-40 SYRINGE (ML) INJECTION EVERY 12 HOURS SCHEDULED
Status: DISCONTINUED | OUTPATIENT
Start: 2024-09-16 | End: 2024-09-17 | Stop reason: HOSPADM

## 2024-09-16 RX ORDER — ACETAMINOPHEN 325 MG/1
650 TABLET ORAL EVERY 6 HOURS PRN
Status: DISCONTINUED | OUTPATIENT
Start: 2024-09-16 | End: 2024-09-17 | Stop reason: HOSPADM

## 2024-09-16 RX ORDER — POLYETHYLENE GLYCOL 3350 17 G/17G
17 POWDER, FOR SOLUTION ORAL DAILY PRN
Status: DISCONTINUED | OUTPATIENT
Start: 2024-09-16 | End: 2024-09-17 | Stop reason: HOSPADM

## 2024-09-16 RX ORDER — METOPROLOL SUCCINATE 25 MG/1
50 TABLET, EXTENDED RELEASE ORAL DAILY
Status: DISCONTINUED | OUTPATIENT
Start: 2024-09-16 | End: 2024-09-17 | Stop reason: HOSPADM

## 2024-09-16 RX ADMIN — IPRATROPIUM BROMIDE 0.5 MG: 0.5 SOLUTION RESPIRATORY (INHALATION) at 15:10

## 2024-09-16 RX ADMIN — INSULIN GLARGINE 26 UNITS: 100 INJECTION, SOLUTION SUBCUTANEOUS at 13:28

## 2024-09-16 RX ADMIN — LOPERAMIDE HYDROCHLORIDE 2 MG: 2 CAPSULE ORAL at 14:54

## 2024-09-16 RX ADMIN — ATORVASTATIN CALCIUM 80 MG: 80 TABLET, FILM COATED ORAL at 13:26

## 2024-09-16 RX ADMIN — PANTOPRAZOLE SODIUM 40 MG: 40 TABLET, DELAYED RELEASE ORAL at 19:58

## 2024-09-16 RX ADMIN — ARFORMOTEROL TARTRATE 15 MCG: 15 SOLUTION RESPIRATORY (INHALATION) at 21:12

## 2024-09-16 RX ADMIN — SODIUM CHLORIDE, PRESERVATIVE FREE 10 ML: 5 INJECTION INTRAVENOUS at 19:58

## 2024-09-16 RX ADMIN — INSULIN LISPRO 2 UNITS: 100 INJECTION, SOLUTION INTRAVENOUS; SUBCUTANEOUS at 13:27

## 2024-09-16 RX ADMIN — GABAPENTIN 200 MG: 100 CAPSULE ORAL at 19:57

## 2024-09-16 RX ADMIN — BUDESONIDE 250 MCG: 0.5 SUSPENSION RESPIRATORY (INHALATION) at 21:12

## 2024-09-16 RX ADMIN — MAGNESIUM SULFATE HEPTAHYDRATE 4000 MG: 40 INJECTION, SOLUTION INTRAVENOUS at 14:53

## 2024-09-16 RX ADMIN — METOPROLOL SUCCINATE 50 MG: 25 TABLET, FILM COATED, EXTENDED RELEASE ORAL at 13:26

## 2024-09-16 RX ADMIN — PANTOPRAZOLE SODIUM 40 MG: 40 TABLET, DELAYED RELEASE ORAL at 13:26

## 2024-09-16 RX ADMIN — POTASSIUM CHLORIDE 20 MEQ: 1500 TABLET, EXTENDED RELEASE ORAL at 14:54

## 2024-09-16 RX ADMIN — GABAPENTIN 100 MG: 100 CAPSULE ORAL at 13:37

## 2024-09-16 RX ADMIN — IPRATROPIUM BROMIDE 0.5 MG: 0.5 SOLUTION RESPIRATORY (INHALATION) at 21:12

## 2024-09-16 ASSESSMENT — ENCOUNTER SYMPTOMS
SUSPICIOUS LESIONS: 0
CONSTIPATION: 0
BACK PAIN: 1
BLURRED VISION: 0
SHORTNESS OF BREATH: 0
SLEEP DISTURBANCES DUE TO BREATHING: 1
ALLERGIC/IMMUNOLOGIC NEGATIVE: 1
COUGH: 0
DOUBLE VISION: 0
SORE THROAT: 0
NAUSEA: 0
DIARRHEA: 1
ABDOMINAL PAIN: 0
SHORTNESS OF BREATH: 1
VOMITING: 0
WHEEZING: 0

## 2024-09-17 ENCOUNTER — APPOINTMENT (OUTPATIENT)
Dept: NON INVASIVE DIAGNOSTICS | Age: 79
End: 2024-09-17
Attending: INTERNAL MEDICINE
Payer: MEDICARE

## 2024-09-17 VITALS
HEIGHT: 60 IN | WEIGHT: 139 LBS | BODY MASS INDEX: 27.29 KG/M2 | HEART RATE: 60 BPM | SYSTOLIC BLOOD PRESSURE: 116 MMHG | TEMPERATURE: 97.7 F | OXYGEN SATURATION: 95 % | RESPIRATION RATE: 16 BRPM | DIASTOLIC BLOOD PRESSURE: 50 MMHG

## 2024-09-17 LAB
ANION GAP SERPL CALC-SCNC: 7 MMOL/L (ref 9–18)
BUN SERPL-MCNC: 10 MG/DL (ref 8–23)
CALCIUM SERPL-MCNC: 8.9 MG/DL (ref 8.8–10.2)
CHLORIDE SERPL-SCNC: 104 MMOL/L (ref 98–107)
CHOLEST SERPL-MCNC: 74 MG/DL (ref 0–200)
CO2 SERPL-SCNC: 28 MMOL/L (ref 20–28)
CREAT SERPL-MCNC: 0.75 MG/DL (ref 0.6–1.1)
ECHO BSA: 1.63 M2
ECHO LV EDV A2C: 61 ML
ECHO LV EDV A4C: 59 ML
ECHO LV EDV INDEX A4C: 37 ML/M2
ECHO LV EDV NDEX A2C: 38 ML/M2
ECHO LV EJECTION FRACTION A2C: 50 %
ECHO LV EJECTION FRACTION A4C: 54 %
ECHO LV EJECTION FRACTION BIPLANE: 53 % (ref 55–100)
ECHO LV ESV A2C: 31 ML
ECHO LV ESV A4C: 27 ML
ECHO LV ESV INDEX A2C: 19 ML/M2
ECHO LV ESV INDEX A4C: 17 ML/M2
ERYTHROCYTE [DISTWIDTH] IN BLOOD BY AUTOMATED COUNT: 14.2 % (ref 11.9–14.6)
EST. AVERAGE GLUCOSE BLD GHB EST-MCNC: 269 MG/DL
GLUCOSE BLD STRIP.AUTO-MCNC: 70 MG/DL (ref 65–100)
GLUCOSE BLD STRIP.AUTO-MCNC: 91 MG/DL (ref 65–100)
GLUCOSE SERPL-MCNC: 160 MG/DL (ref 70–99)
HBA1C MFR BLD: 11 % (ref 0–5.6)
HCT VFR BLD AUTO: 36.8 % (ref 35.8–46.3)
HDLC SERPL-MCNC: 27 MG/DL (ref 40–60)
HDLC SERPL: 2.8 (ref 0–5)
HGB BLD-MCNC: 11.8 G/DL (ref 11.7–15.4)
LDLC SERPL CALC-MCNC: 27 MG/DL (ref 0–100)
MAGNESIUM SERPL-MCNC: 2.2 MG/DL (ref 1.8–2.4)
MCH RBC QN AUTO: 33.3 PG (ref 26.1–32.9)
MCHC RBC AUTO-ENTMCNC: 32.1 G/DL (ref 31.4–35)
MCV RBC AUTO: 104 FL (ref 82–102)
NRBC # BLD: 0 K/UL (ref 0–0.2)
PLATELET # BLD AUTO: 174 K/UL (ref 150–450)
PMV BLD AUTO: 11.3 FL (ref 9.4–12.3)
POTASSIUM SERPL-SCNC: 4.2 MMOL/L (ref 3.5–5.1)
RBC # BLD AUTO: 3.54 M/UL (ref 4.05–5.2)
SERVICE CMNT-IMP: NORMAL
SERVICE CMNT-IMP: NORMAL
SODIUM SERPL-SCNC: 140 MMOL/L (ref 136–145)
TRIGL SERPL-MCNC: 99 MG/DL (ref 0–150)
VLDLC SERPL CALC-MCNC: 20 MG/DL (ref 6–23)
WBC # BLD AUTO: 6.2 K/UL (ref 4.3–11.1)

## 2024-09-17 PROCEDURE — 6370000000 HC RX 637 (ALT 250 FOR IP): Performed by: NURSE PRACTITIONER

## 2024-09-17 PROCEDURE — 94760 N-INVAS EAR/PLS OXIMETRY 1: CPT

## 2024-09-17 PROCEDURE — G0378 HOSPITAL OBSERVATION PER HR: HCPCS

## 2024-09-17 PROCEDURE — 99238 HOSP IP/OBS DSCHRG MGMT 30/<: CPT | Performed by: INTERNAL MEDICINE

## 2024-09-17 PROCEDURE — 80048 BASIC METABOLIC PNL TOTAL CA: CPT

## 2024-09-17 PROCEDURE — 80061 LIPID PANEL: CPT

## 2024-09-17 PROCEDURE — 93308 TTE F-UP OR LMTD: CPT

## 2024-09-17 PROCEDURE — 83735 ASSAY OF MAGNESIUM: CPT

## 2024-09-17 PROCEDURE — 93308 TTE F-UP OR LMTD: CPT | Performed by: INTERNAL MEDICINE

## 2024-09-17 PROCEDURE — 83036 HEMOGLOBIN GLYCOSYLATED A1C: CPT

## 2024-09-17 PROCEDURE — 94640 AIRWAY INHALATION TREATMENT: CPT

## 2024-09-17 PROCEDURE — 85027 COMPLETE CBC AUTOMATED: CPT

## 2024-09-17 PROCEDURE — 36415 COLL VENOUS BLD VENIPUNCTURE: CPT

## 2024-09-17 PROCEDURE — 6360000002 HC RX W HCPCS: Performed by: INTERNAL MEDICINE

## 2024-09-17 PROCEDURE — 2580000003 HC RX 258: Performed by: NURSE PRACTITIONER

## 2024-09-17 PROCEDURE — 82962 GLUCOSE BLOOD TEST: CPT

## 2024-09-17 PROCEDURE — 94664 DEMO&/EVAL PT USE INHALER: CPT

## 2024-09-17 RX ORDER — INSULIN GLARGINE 100 [IU]/ML
20 INJECTION, SOLUTION SUBCUTANEOUS NIGHTLY
Status: DISCONTINUED | OUTPATIENT
Start: 2024-09-17 | End: 2024-09-17 | Stop reason: HOSPADM

## 2024-09-17 RX ADMIN — METFORMIN HYDROCHLORIDE 1000 MG: 500 TABLET ORAL at 07:52

## 2024-09-17 RX ADMIN — GABAPENTIN 100 MG: 100 CAPSULE ORAL at 07:52

## 2024-09-17 RX ADMIN — POTASSIUM CHLORIDE 20 MEQ: 1500 TABLET, EXTENDED RELEASE ORAL at 07:52

## 2024-09-17 RX ADMIN — ATORVASTATIN CALCIUM 80 MG: 80 TABLET, FILM COATED ORAL at 07:52

## 2024-09-17 RX ADMIN — GABAPENTIN 100 MG: 100 CAPSULE ORAL at 13:35

## 2024-09-17 RX ADMIN — ARFORMOTEROL TARTRATE 15 MCG: 15 SOLUTION RESPIRATORY (INHALATION) at 08:17

## 2024-09-17 RX ADMIN — ACETAMINOPHEN 650 MG: 325 TABLET ORAL at 05:35

## 2024-09-17 RX ADMIN — SODIUM CHLORIDE, PRESERVATIVE FREE 10 ML: 5 INJECTION INTRAVENOUS at 07:56

## 2024-09-17 RX ADMIN — FERROUS SULFATE TAB 325 MG (65 MG ELEMENTAL FE) 325 MG: 325 (65 FE) TAB at 05:35

## 2024-09-17 RX ADMIN — DRONEDARONE 400 MG: 400 TABLET, FILM COATED ORAL at 14:23

## 2024-09-17 RX ADMIN — IPRATROPIUM BROMIDE 0.5 MG: 0.5 SOLUTION RESPIRATORY (INHALATION) at 08:17

## 2024-09-17 RX ADMIN — BUDESONIDE 250 MCG: 0.5 SUSPENSION RESPIRATORY (INHALATION) at 08:17

## 2024-09-17 RX ADMIN — PANTOPRAZOLE SODIUM 40 MG: 40 TABLET, DELAYED RELEASE ORAL at 07:52

## 2024-09-17 RX ADMIN — METOPROLOL SUCCINATE 50 MG: 25 TABLET, FILM COATED, EXTENDED RELEASE ORAL at 07:55

## 2024-09-17 ASSESSMENT — PAIN SCALES - GENERAL
PAINLEVEL_OUTOF10: 0
PAINLEVEL_OUTOF10: 3
PAINLEVEL_OUTOF10: 0

## 2024-09-17 ASSESSMENT — PAIN DESCRIPTION - LOCATION: LOCATION: HIP

## 2024-09-17 ASSESSMENT — PAIN DESCRIPTION - DESCRIPTORS: DESCRIPTORS: ACHING

## 2024-09-17 ASSESSMENT — PAIN DESCRIPTION - ORIENTATION: ORIENTATION: LEFT

## 2024-09-24 ENCOUNTER — TELEPHONE (OUTPATIENT)
Age: 79
End: 2024-09-24

## 2024-09-24 DIAGNOSIS — R00.1 BRADYCARDIA: Primary | ICD-10-CM

## 2024-09-25 ENCOUNTER — INITIAL CONSULT (OUTPATIENT)
Age: 79
End: 2024-09-25
Payer: MEDICARE

## 2024-09-25 VITALS
DIASTOLIC BLOOD PRESSURE: 70 MMHG | SYSTOLIC BLOOD PRESSURE: 118 MMHG | WEIGHT: 135.9 LBS | HEIGHT: 62 IN | HEART RATE: 62 BPM | BODY MASS INDEX: 25.01 KG/M2

## 2024-09-25 DIAGNOSIS — I47.10 PSVT (PAROXYSMAL SUPRAVENTRICULAR TACHYCARDIA) (HCC): Primary | ICD-10-CM

## 2024-09-25 PROCEDURE — 3078F DIAST BP <80 MM HG: CPT | Performed by: INTERNAL MEDICINE

## 2024-09-25 PROCEDURE — 1123F ACP DISCUSS/DSCN MKR DOCD: CPT | Performed by: INTERNAL MEDICINE

## 2024-09-25 PROCEDURE — 3074F SYST BP LT 130 MM HG: CPT | Performed by: INTERNAL MEDICINE

## 2024-09-25 PROCEDURE — 1090F PRES/ABSN URINE INCON ASSESS: CPT | Performed by: INTERNAL MEDICINE

## 2024-09-25 PROCEDURE — G8399 PT W/DXA RESULTS DOCUMENT: HCPCS | Performed by: INTERNAL MEDICINE

## 2024-09-25 PROCEDURE — 93000 ELECTROCARDIOGRAM COMPLETE: CPT | Performed by: INTERNAL MEDICINE

## 2024-09-25 PROCEDURE — G8428 CUR MEDS NOT DOCUMENT: HCPCS | Performed by: INTERNAL MEDICINE

## 2024-09-25 PROCEDURE — 1036F TOBACCO NON-USER: CPT | Performed by: INTERNAL MEDICINE

## 2024-09-25 PROCEDURE — 99213 OFFICE O/P EST LOW 20 MIN: CPT | Performed by: INTERNAL MEDICINE

## 2024-09-25 PROCEDURE — G8420 CALC BMI NORM PARAMETERS: HCPCS | Performed by: INTERNAL MEDICINE

## 2024-09-25 RX ORDER — TRAZODONE HYDROCHLORIDE 50 MG/1
50 TABLET, FILM COATED ORAL
COMMUNITY
Start: 2024-07-01

## 2024-10-10 ENCOUNTER — TELEPHONE (OUTPATIENT)
Age: 79
End: 2024-10-10

## 2024-10-10 NOTE — TELEPHONE ENCOUNTER
Received call from Viky with [employer].     Pt had episode of SVT on 9/22/24 @ 10:19 am with HR of 182 bpm for 30 seconds.     End of service report uploaded in chart for Dr. Quispe to review.

## 2024-10-23 NOTE — PROGRESS NOTES
[facility] CARDIOLOGY  [address] [place] [address], [address]  [place] [address]  PHONE: [phone]      10/24/24    NAME:  Marce Noe  : 1945  MRN: [id number]         SUBJECTIVE:   Marce Noe is a 78 y.o. female seen for a follow up visit regarding the following:     Chief Complaint   Patient presents with    PSVT (paroxysmal supraventricular tachycardia) (HCC)    PVC's (premature ventricular contractions)    Hypomagnesemia    Sinus node dysfunction (HCC)    Type 2 diabetes mellitus with other skin ulcer, with long-t    S/P CABG x 3            HPI:  Follow up  PSVT (paroxysmal supraventricular tachycardia) (HCC); PVC's (premature ventricular contractions); Hypomagnesemia; Sinus node dysfunction (HCC); Type 2 diabetes mellitus with other skin ulcer, with long-t; and S/P CABG x 3   .    Follow up CAD/CABG  . Prior SVT in setting of metabolic derangement, poorly controlled DM,lymphedema bradycardia.  Longstanding difficulty confirming her med list, even when she brings her bottles, see prior notes. distal PAD of the LLE with claudication and poor wound healing.  Severe SIMI by testing at [facility] in 2024.  Limited compliance with follow up and therapy.  Off CPAP for a bit with the power outage and again with a sore in her nose.        At her last visit on 24, she was directly admitted, having presented in SVT, symptomatic.  In house, her DM was uncontrolled, hospitalists adjusted her regimen.  My colleagues saw her in consultation, started Multaq and arranged for 14 day monitor upon discharge. This showed continued prolonged episodes of narrow complex, long R-P tachycardia.  No symptoms reported. PVC burden remains high at 27%.  PCP had reduced her dronedarone to 200 mg BID having recorded a heart rate of 38 in office, though may have been related to PVC's?  She continues to feel palpitations, though less frequent, last had it about a week ago.  No anginal quality chest

## 2024-10-24 ENCOUNTER — OFFICE VISIT (OUTPATIENT)
Age: 79
End: 2024-10-24
Payer: MEDICARE

## 2024-10-24 VITALS
WEIGHT: 132.6 LBS | SYSTOLIC BLOOD PRESSURE: 120 MMHG | HEIGHT: 62 IN | BODY MASS INDEX: 24.4 KG/M2 | DIASTOLIC BLOOD PRESSURE: 70 MMHG | HEART RATE: 69 BPM

## 2024-10-24 DIAGNOSIS — I25.118 CORONARY ARTERY DISEASE OF NATIVE ARTERY OF NATIVE HEART WITH STABLE ANGINA PECTORIS (HCC): Chronic | ICD-10-CM

## 2024-10-24 DIAGNOSIS — I47.10 PSVT (PAROXYSMAL SUPRAVENTRICULAR TACHYCARDIA) (HCC): Primary | ICD-10-CM

## 2024-10-24 DIAGNOSIS — Z95.1 S/P CABG X 3: Chronic | ICD-10-CM

## 2024-10-24 DIAGNOSIS — Z79.4 TYPE 2 DIABETES MELLITUS WITH OTHER SKIN ULCER, WITH LONG-TERM CURRENT USE OF INSULIN (HCC): Chronic | ICD-10-CM

## 2024-10-24 DIAGNOSIS — E11.622 TYPE 2 DIABETES MELLITUS WITH OTHER SKIN ULCER, WITH LONG-TERM CURRENT USE OF INSULIN (HCC): Chronic | ICD-10-CM

## 2024-10-24 DIAGNOSIS — I49.5 SINUS NODE DYSFUNCTION (HCC): ICD-10-CM

## 2024-10-24 DIAGNOSIS — I10 ESSENTIAL HYPERTENSION: ICD-10-CM

## 2024-10-24 DIAGNOSIS — I73.9 PERIPHERAL ARTERY DISEASE (HCC): ICD-10-CM

## 2024-10-24 DIAGNOSIS — E78.00 PURE HYPERCHOLESTEROLEMIA: Chronic | ICD-10-CM

## 2024-10-24 PROCEDURE — 99214 OFFICE O/P EST MOD 30 MIN: CPT | Performed by: INTERNAL MEDICINE

## 2024-10-24 PROCEDURE — G8484 FLU IMMUNIZE NO ADMIN: HCPCS | Performed by: INTERNAL MEDICINE

## 2024-10-24 PROCEDURE — 3078F DIAST BP <80 MM HG: CPT | Performed by: INTERNAL MEDICINE

## 2024-10-24 PROCEDURE — 1160F RVW MEDS BY RX/DR IN RCRD: CPT | Performed by: INTERNAL MEDICINE

## 2024-10-24 PROCEDURE — 1090F PRES/ABSN URINE INCON ASSESS: CPT | Performed by: INTERNAL MEDICINE

## 2024-10-24 PROCEDURE — G8420 CALC BMI NORM PARAMETERS: HCPCS | Performed by: INTERNAL MEDICINE

## 2024-10-24 PROCEDURE — 3074F SYST BP LT 130 MM HG: CPT | Performed by: INTERNAL MEDICINE

## 2024-10-24 PROCEDURE — 93000 ELECTROCARDIOGRAM COMPLETE: CPT | Performed by: INTERNAL MEDICINE

## 2024-10-24 PROCEDURE — G8399 PT W/DXA RESULTS DOCUMENT: HCPCS | Performed by: INTERNAL MEDICINE

## 2024-10-24 PROCEDURE — 1123F ACP DISCUSS/DSCN MKR DOCD: CPT | Performed by: INTERNAL MEDICINE

## 2024-10-24 PROCEDURE — 1125F AMNT PAIN NOTED PAIN PRSNT: CPT | Performed by: INTERNAL MEDICINE

## 2024-10-24 PROCEDURE — 1159F MED LIST DOCD IN RCRD: CPT | Performed by: INTERNAL MEDICINE

## 2024-10-24 PROCEDURE — G8427 DOCREV CUR MEDS BY ELIG CLIN: HCPCS | Performed by: INTERNAL MEDICINE

## 2024-10-24 PROCEDURE — 1036F TOBACCO NON-USER: CPT | Performed by: INTERNAL MEDICINE

## 2024-10-24 PROCEDURE — 3046F HEMOGLOBIN A1C LEVEL >9.0%: CPT | Performed by: INTERNAL MEDICINE

## 2024-11-04 ENCOUNTER — TELEPHONE (OUTPATIENT)
Age: 79
End: 2024-11-04

## 2024-11-04 NOTE — TELEPHONE ENCOUNTER
Cardiac Clearance        Physician or Practice Requesting:[facility] division of pain medicine & physiatry   : Mackenzie  Contact Phone Number: [phone]  Fax Number: [phone]  Date of Surgery/Procedure: TBD  Type of Surgery or Procedure: Lumbar MESSI   Type of Anesthesia:   Type of Clearance Requested: risk assessment and any medication hold   Medication to Hold:Plavix   Days to Hold: 7 day hold

## 2024-11-05 NOTE — TELEPHONE ENCOUNTER
Deb Sky MD  You2 hours ago (7:24 AM)     BM  She's low risk to hold plavix, per se, but she continues to have frequent SVT and PVC\"s.  Asymptomatic so if not excessively tachycardic during procedure risk is low.  Would also recommend reaching out to her PCP since her diabetes remains very poorl y controlled.       Faxed.

## 2024-12-06 ENCOUNTER — OFFICE VISIT (OUTPATIENT)
Age: 79
End: 2024-12-06
Payer: MEDICARE

## 2024-12-06 VITALS
HEIGHT: 62 IN | BODY MASS INDEX: 24.48 KG/M2 | DIASTOLIC BLOOD PRESSURE: 62 MMHG | SYSTOLIC BLOOD PRESSURE: 130 MMHG | WEIGHT: 133 LBS | HEART RATE: 78 BPM

## 2024-12-06 DIAGNOSIS — I47.10 PSVT (PAROXYSMAL SUPRAVENTRICULAR TACHYCARDIA) (HCC): Primary | ICD-10-CM

## 2024-12-06 PROCEDURE — 99214 OFFICE O/P EST MOD 30 MIN: CPT | Performed by: INTERNAL MEDICINE

## 2024-12-06 PROCEDURE — 1126F AMNT PAIN NOTED NONE PRSNT: CPT | Performed by: INTERNAL MEDICINE

## 2024-12-06 PROCEDURE — 3075F SYST BP GE 130 - 139MM HG: CPT | Performed by: INTERNAL MEDICINE

## 2024-12-06 PROCEDURE — 3078F DIAST BP <80 MM HG: CPT | Performed by: INTERNAL MEDICINE

## 2024-12-06 PROCEDURE — G8428 CUR MEDS NOT DOCUMENT: HCPCS | Performed by: INTERNAL MEDICINE

## 2024-12-06 PROCEDURE — 1123F ACP DISCUSS/DSCN MKR DOCD: CPT | Performed by: INTERNAL MEDICINE

## 2024-12-06 PROCEDURE — 1090F PRES/ABSN URINE INCON ASSESS: CPT | Performed by: INTERNAL MEDICINE

## 2024-12-06 PROCEDURE — 93000 ELECTROCARDIOGRAM COMPLETE: CPT | Performed by: INTERNAL MEDICINE

## 2024-12-06 PROCEDURE — G8484 FLU IMMUNIZE NO ADMIN: HCPCS | Performed by: INTERNAL MEDICINE

## 2024-12-06 PROCEDURE — G8399 PT W/DXA RESULTS DOCUMENT: HCPCS | Performed by: INTERNAL MEDICINE

## 2024-12-06 PROCEDURE — G8420 CALC BMI NORM PARAMETERS: HCPCS | Performed by: INTERNAL MEDICINE

## 2024-12-06 PROCEDURE — 1036F TOBACCO NON-USER: CPT | Performed by: INTERNAL MEDICINE

## 2024-12-06 NOTE — PROGRESS NOTES
XL) 50 MG extended release tablet Take 1 tablet by mouth daily 90 tablet 3    Fluticasone-Umeclidin-Vilant (TRELEGY ELLIPTA IN) Inhale into the lungs      nitroGLYCERIN (NITROSTAT) 0.4 MG SL tablet Place 1 tablet under the tongue every 5 minutes as needed for Chest pain 25 tablet 11    melatonin 1 MG tablet Take 5 tablets by mouth nightly as needed for Sleep      acetaminophen (TYLENOL) 500 MG tablet Take by mouth every 4 hours as needed      atorvastatin (LIPITOR) 80 MG tablet Take 1 tablet by mouth daily      ferrous sulfate (IRON 325) 325 (65 Fe) MG tablet Take 65 mg by mouth every morning (before breakfast)      furosemide (LASIX) 20 MG tablet Take one tab now for swelling    PRN      metFORMIN (GLUCOPHAGE) 1000 MG tablet 2 times daily (with meals)      pantoprazole (PROTONIX) 40 MG tablet Take 1 tablet by mouth 2 times daily      Elastic Bandages & Supports (JOBST KNEE HIGH COMPRESSION SM) MISC 1 each by Does not apply route daily (Patient not taking: Reported on 10/24/2024) 1 each 0    clopidogrel (PLAVIX) 75 MG tablet Take 1 tablet by mouth daily (Patient not taking: Reported on 12/6/2024) 90 tablet 3    Lancets MIS Pt's choice- Test TID. Dx:E11.9       No current facility-administered medications for this visit.     Allergies   Allergen Reactions    Aspirin Itching    Codeine Itching and Swelling    Sulfa Antibiotics Itching    Cephalexin Rash    Penicillins Rash     [unfilled]  Past Medical History:   Diagnosis Date    Anxiety and depression     Arthritis     CAD (coronary artery disease) 9/16/2019    Chest discomfort     Apr 2014 - Lexiscan MPI - normal EKG, perfusion images and LVEF    Chronic back pain     COPD (chronic obstructive pulmonary disease) ([facility])     DM2 (diabetes mellitus, type 2) ([facility]) 6/21/2012    Frequent UTI     GERD (gastroesophageal reflux disease)     H/O seasonal allergies     Heart murmur     Hiatal hernia     History of kidney stones     HLD (hyperlipidemia) 6/21/2012    HTN

## 2025-02-21 NOTE — PROGRESS NOTES
[facility] CARDIOLOGY  [address] [place] [address], [address]  [place] [address]  PHONE: [phone]      25    NAME:  Marce Noe  : 1945  MRN: [id number]         SUBJECTIVE:   Marce Noe is a 79 y.o. female seen for a follow up visit regarding the following:     Chief Complaint   Patient presents with    Coronary Artery Disease    Hypertension            HPI:  Follow up  Coronary Artery Disease and Hypertension   .    Follow up CAD/CABG  2019, PSVT, poorly controlled DM,lymphedema bradycardia.  Longstanding difficulty confirming her med list, even when she brings her bottles, see prior notes. distal PAD of the LLE with claudication and poor wound healing.  Severe SIMI by testing at [facility] in 2024.  Limited compliance with follow up and therapy. Referred back to EP last visit with ongoing episodes of PSVT and high burden PVC's.  She did see him, he offered ablation but she declined, so he left her on Multaq    Returns having been admitted to [facility] 25 for HA and apahsia, improved after 30 minutes but told her orthopedist about it the next morning, sent to ER.  She was hyperglycemic, CT brain no acute findings, CTA head/neck 50% pLICCA, 40% right cavernous ICA, 30% pRcervical ICA. An echo there looked well/stable.  They held her gabapentin and recommended an event monitor which appears below, only recorded 23 hours with PVC burden 32%. She was also treated for concomitant UTI    She's felt better.  No anginal quality discomfort, has had a few shooting pains.  Denies exertional dyspnea  she sleeps on an adjustable bed, keeps her head elevated.  When she was laid down at her dental visit she felt short of breath.  They had her almost at negative tilt, they propped her up minimally and it resolved.      She reports she was taking high dose gabapentin prior to hospital as well.  Her daughters advised her to reduce it, she cut it in half and is doing better so far.

## 2025-02-24 ENCOUNTER — OFFICE VISIT (OUTPATIENT)
Age: 80
End: 2025-02-24
Payer: MEDICARE

## 2025-02-24 VITALS
SYSTOLIC BLOOD PRESSURE: 136 MMHG | HEART RATE: 40 BPM | WEIGHT: 128 LBS | DIASTOLIC BLOOD PRESSURE: 82 MMHG | HEIGHT: 62 IN | BODY MASS INDEX: 23.55 KG/M2

## 2025-02-24 DIAGNOSIS — I49.3 PVC (PREMATURE VENTRICULAR CONTRACTION): ICD-10-CM

## 2025-02-24 DIAGNOSIS — G45.9 TIA (TRANSIENT ISCHEMIC ATTACK): ICD-10-CM

## 2025-02-24 DIAGNOSIS — I47.10 PSVT (PAROXYSMAL SUPRAVENTRICULAR TACHYCARDIA): Primary | ICD-10-CM

## 2025-02-24 DIAGNOSIS — Z95.1 S/P CABG X 3: Chronic | ICD-10-CM

## 2025-02-24 DIAGNOSIS — I10 ESSENTIAL HYPERTENSION: ICD-10-CM

## 2025-02-24 DIAGNOSIS — I25.118 CORONARY ARTERY DISEASE OF NATIVE ARTERY OF NATIVE HEART WITH STABLE ANGINA PECTORIS: Chronic | ICD-10-CM

## 2025-02-24 DIAGNOSIS — I65.23 BILATERAL CAROTID ARTERY STENOSIS: ICD-10-CM

## 2025-02-24 PROCEDURE — 1123F ACP DISCUSS/DSCN MKR DOCD: CPT | Performed by: INTERNAL MEDICINE

## 2025-02-24 PROCEDURE — 3079F DIAST BP 80-89 MM HG: CPT | Performed by: INTERNAL MEDICINE

## 2025-02-24 PROCEDURE — 1160F RVW MEDS BY RX/DR IN RCRD: CPT | Performed by: INTERNAL MEDICINE

## 2025-02-24 PROCEDURE — 1159F MED LIST DOCD IN RCRD: CPT | Performed by: INTERNAL MEDICINE

## 2025-02-24 PROCEDURE — 1036F TOBACCO NON-USER: CPT | Performed by: INTERNAL MEDICINE

## 2025-02-24 PROCEDURE — 99214 OFFICE O/P EST MOD 30 MIN: CPT | Performed by: INTERNAL MEDICINE

## 2025-02-24 PROCEDURE — G8420 CALC BMI NORM PARAMETERS: HCPCS | Performed by: INTERNAL MEDICINE

## 2025-02-24 PROCEDURE — 3075F SYST BP GE 130 - 139MM HG: CPT | Performed by: INTERNAL MEDICINE

## 2025-02-24 PROCEDURE — G8427 DOCREV CUR MEDS BY ELIG CLIN: HCPCS | Performed by: INTERNAL MEDICINE

## 2025-02-24 PROCEDURE — 1090F PRES/ABSN URINE INCON ASSESS: CPT | Performed by: INTERNAL MEDICINE

## 2025-02-24 PROCEDURE — G8399 PT W/DXA RESULTS DOCUMENT: HCPCS | Performed by: INTERNAL MEDICINE

## 2025-02-24 PROCEDURE — 1125F AMNT PAIN NOTED PAIN PRSNT: CPT | Performed by: INTERNAL MEDICINE

## 2025-02-24 RX ORDER — AMLODIPINE BESYLATE 2.5 MG/1
2.5 TABLET ORAL DAILY
COMMUNITY
Start: 2024-12-16

## 2025-02-24 RX ORDER — ALBUTEROL SULFATE 90 UG/1
2 INHALANT RESPIRATORY (INHALATION) EVERY 6 HOURS PRN
COMMUNITY

## 2025-02-24 RX ORDER — VALSARTAN 40 MG/1
TABLET ORAL
COMMUNITY
Start: 2025-01-21

## 2025-02-24 ASSESSMENT — ENCOUNTER SYMPTOMS: SHORTNESS OF BREATH: 0

## 2025-03-21 ENCOUNTER — TELEPHONE (OUTPATIENT)
Age: 80
End: 2025-03-21

## 2025-03-21 DIAGNOSIS — Z79.899 HIGH RISK MEDICATION USE: Primary | ICD-10-CM

## 2025-03-21 RX ORDER — AMIODARONE HYDROCHLORIDE 200 MG/1
TABLET ORAL
Qty: 90 TABLET | Refills: 1 | Status: SHIPPED | OUTPATIENT
Start: 2025-03-21 | End: 2025-10-01

## 2025-03-21 NOTE — TELEPHONE ENCOUNTER
Prescription sent to pharmacy[facility]    Requested Prescriptions     Signed Prescriptions Disp Refills    amiodarone (CORDARONE) 200 MG tablet 90 tablet 1     Sig: Take 1 tablet by mouth 2 times daily for 14 days, THEN 1 tablet daily.     Authorizing Provider: AUSTEN CORONA     Ordering User: RUPERTO UMANZOR

## 2025-03-21 NOTE — TELEPHONE ENCOUNTER
Theres not really much we can do if she's slap out of it. We will have to get her started on an alternative antiarrhythmic. Because she has coronary disease options are limited.  Its going to have to be amiodarone and/or reconsider the PVC ablation with Dr Perdomo.  For amiodarone, start with 200 mg BID for 14 days then 200 mg once daily.  She will need baseline LFT's, TSH, and PFT's with LFT's and TSH every 6 months thereafter.  She can start this and follow up with Dr Perdomo if she would like to discuss other options, and if she's able to get patient assistance for multaq we can switch back later.

## 2025-03-21 NOTE — TELEPHONE ENCOUNTER
Patient called along with a representative for her insurance company stating she took last Multaq this morning and she is not able to afford medication due to high deductible. Advised we can apply for patient assistance. No samples available at this time. Advised I would see what provider would like to do.

## 2025-03-21 NOTE — TELEPHONE ENCOUNTER
Attempted to call patient phone did not ring. Called and spoke with braulio on [facility] and let her know we were going to send in a different medication, but asked her to have patient call me back so we can discuss. Advised if I did not hear anything back by end of day I would send to pharmacy with instructions and attempt to call patient again on Monday to discuss.

## 2025-03-24 DIAGNOSIS — Z79.899 HIGH RISK MEDICATION USE: ICD-10-CM

## 2025-03-24 DIAGNOSIS — Z79.899 LONG TERM CURRENT USE OF AMIODARONE: Primary | ICD-10-CM

## 2025-03-24 LAB
ALBUMIN SERPL-MCNC: 3.5 G/DL (ref 3.2–4.6)
ALBUMIN/GLOB SERPL: 1.1 (ref 1–1.9)
ALP SERPL-CCNC: 104 U/L (ref 35–104)
ALT SERPL-CCNC: 44 U/L (ref 8–45)
AST SERPL-CCNC: 33 U/L (ref 15–37)
BILIRUB DIRECT SERPL-MCNC: <0.2 MG/DL (ref 0–0.4)
BILIRUB SERPL-MCNC: 0.4 MG/DL (ref 0–1.2)
GLOBULIN SER CALC-MCNC: 3.2 G/DL (ref 2.3–3.5)
PROT SERPL-MCNC: 6.7 G/DL (ref 6.3–8.2)
TSH, 3RD GENERATION: 1.35 UIU/ML (ref 0.27–4.2)

## 2025-04-01 ENCOUNTER — TELEPHONE (OUTPATIENT)
Age: 80
End: 2025-04-01

## 2025-04-01 NOTE — TELEPHONE ENCOUNTER
SELVIN Kay at Dr Pace's office stating Dr Pace saw pt this am and was going to refer pt to Vascular due to Claudication in left leg. Dr Pace wanted to check with Dr Quispe first just to make sure she didn't need to see pt before they refer pt to [facility] Nurse will check with Dr Quispe and then will let them know.

## 2025-04-01 NOTE — TELEPHONE ENCOUNTER
Dr. Feldman office has some questions about patient about her leg would like a call regarding and to ask for leobardo at [phone].

## 2025-04-17 ENCOUNTER — CLINICAL SUPPORT (OUTPATIENT)
Dept: PULMONOLOGY | Age: 80
End: 2025-04-17

## 2025-04-17 DIAGNOSIS — Z79.899 LONG TERM CURRENT USE OF AMIODARONE: Primary | ICD-10-CM

## 2025-04-17 LAB
FEV 1 , POC: 1.83 L
FEV1 % PRED, POC: 104 %
FEV1/FVC, POC: NORMAL
FVC % PRED, POC: 90 %
FVC, POC: NORMAL

## 2025-04-17 ASSESSMENT — PULMONARY FUNCTION TESTS
FEV1_PERCENT_PREDICTED_POC: 104
FVC_PERCENT_PREDICTED_POC: 90

## 2025-04-18 ENCOUNTER — RESULTS FOLLOW-UP (OUTPATIENT)
Age: 80
End: 2025-04-18

## 2025-04-18 DIAGNOSIS — R94.2 DECREASED DIFFUSION CAPACITY OF LUNG: ICD-10-CM

## 2025-04-18 DIAGNOSIS — R06.00 DYSPNEA: Primary | ICD-10-CM

## 2025-04-18 NOTE — TELEPHONE ENCOUNTER
----- Message from Dr. Deb Sky MD sent at 4/18/2025  7:52 AM EDT -----  She has a severely reduced DLCO at baseline.  Med list indicates she is taking amiodarone which she will need to stop.  She couldn't afford multaq.  Pulmonologist also recommended dedicated pulmonary imaging so will need a high resolution chest CT for diagnosis dyspnea, reduced DLCO, suspected interstitial lung disease.  Please arrange follow up with Dr Perdomo for ongoing management of her PSVT, she saw him 12/6/24, declined ablation at that time.

## 2025-04-18 NOTE — TELEPHONE ENCOUNTER
Left message on voicemail for patient to call this triage nurse. In message, advised patient to D/C amiodarone and call for further instructions.

## 2025-04-18 NOTE — RESULT ENCOUNTER NOTE
She has a severely reduced DLCO at baseline.  Med list indicates she is taking amiodarone which she will need to stop.  She couldn't afford multaq.  Pulmonologist also recommended dedicated pulmonary imaging so will need a high resolution chest CT for diagnosis dyspnea, reduced DLCO, suspected interstitial lung disease.  Please arrange follow up with Dr Perdomo for ongoing management of her PSVT, she saw him 12/6/24, declined ablation at that time.

## 2025-04-18 NOTE — TELEPHONE ENCOUNTER
Left message on voicemail for patient to call this triage nurse. Removed amiodarone from current med list.   Orders Placed This Encounter   Procedures    CT CHEST HIGH RESOLUTION     Standing Status:   Future     Expected Date:   4/18/2025     Expiration Date:   4/18/2026     Reason for exam::   dyspnea, reduced  DLCO, suspected interstitial lung disease

## 2025-04-18 NOTE — TELEPHONE ENCOUNTER
Advised patient of Dr. Quispe's instructions, as above. Instructed patient to stop amiodarone and call [facility] Radiology Scheduling to schedule CT. Advised patient that EP  will be calling to schedule appointment with Dr. Perdomo. Patient verbalized understanding. Routed this triage note to [facility] with request to schedule appointment with Dr. Perdomo.

## 2025-04-18 NOTE — TELEPHONE ENCOUNTER
Spoke with daughter, Beata, who states patient is awaiting call back. She asks for me to call patient's sister's number, [phone]. Sister lives next door to patient and will take phone to patient.

## 2025-04-28 ENCOUNTER — HOSPITAL ENCOUNTER (OUTPATIENT)
Dept: CT IMAGING | Age: 80
Discharge: HOME | End: 2025-04-30
Attending: INTERNAL MEDICINE
Payer: MEDICARE

## 2025-04-28 DIAGNOSIS — R06.00 DYSPNEA: ICD-10-CM

## 2025-04-28 DIAGNOSIS — R94.2 DECREASED DIFFUSION CAPACITY OF LUNG: ICD-10-CM

## 2025-04-28 PROCEDURE — 71250 CT THORAX DX C-: CPT

## 2025-04-30 ENCOUNTER — RESULTS FOLLOW-UP (OUTPATIENT)
Dept: CARDIOLOGY | Age: 80
End: 2025-04-30

## 2025-05-09 ENCOUNTER — OFFICE VISIT (OUTPATIENT)
Dept: VASCULAR SURGERY | Age: 80
End: 2025-05-09
Payer: MEDICARE

## 2025-05-09 VITALS
DIASTOLIC BLOOD PRESSURE: 61 MMHG | BODY MASS INDEX: 23.19 KG/M2 | HEART RATE: 53 BPM | OXYGEN SATURATION: 95 % | WEIGHT: 126 LBS | SYSTOLIC BLOOD PRESSURE: 156 MMHG | HEIGHT: 62 IN

## 2025-05-09 DIAGNOSIS — I73.9 PVD (PERIPHERAL VASCULAR DISEASE) WITH CLAUDICATION: Primary | ICD-10-CM

## 2025-05-09 PROCEDURE — 99204 OFFICE O/P NEW MOD 45 MIN: CPT | Performed by: SURGERY

## 2025-05-09 PROCEDURE — G8399 PT W/DXA RESULTS DOCUMENT: HCPCS | Performed by: SURGERY

## 2025-05-09 PROCEDURE — 1036F TOBACCO NON-USER: CPT | Performed by: SURGERY

## 2025-05-09 PROCEDURE — 1123F ACP DISCUSS/DSCN MKR DOCD: CPT | Performed by: SURGERY

## 2025-05-09 PROCEDURE — 3078F DIAST BP <80 MM HG: CPT | Performed by: SURGERY

## 2025-05-09 PROCEDURE — 1159F MED LIST DOCD IN RCRD: CPT | Performed by: SURGERY

## 2025-05-09 PROCEDURE — G8420 CALC BMI NORM PARAMETERS: HCPCS | Performed by: SURGERY

## 2025-05-09 PROCEDURE — 1090F PRES/ABSN URINE INCON ASSESS: CPT | Performed by: SURGERY

## 2025-05-09 PROCEDURE — G8427 DOCREV CUR MEDS BY ELIG CLIN: HCPCS | Performed by: SURGERY

## 2025-05-09 PROCEDURE — 3077F SYST BP >= 140 MM HG: CPT | Performed by: SURGERY

## 2025-05-09 NOTE — PROGRESS NOTES
[address] [address]  [phone] FAX: [phone]    Marce Noe  1945    Chief Complaint   Patient presents with    New Patient    Results     New patient; BLE Arterial u/s           HPI   Ms. Marce Noe is a 79 y.o. year old female who presents for left foot pain for several months.  She has history of lower back pain has had multiple steroid injections with improvement.    Current Outpatient Medications   Medication Sig Dispense Refill    albuterol sulfate HFA (VENTOLIN HFA) 108 (90 Base) MCG/ACT inhaler Inhale 2 puffs into the lungs every 6 hours as needed for Wheezing      amLODIPine (NORVASC) 2.5 MG tablet Take 1 tablet by mouth daily      valsartan (DIOVAN) 40 MG tablet TAKE 1 TABLET BY MOUTH ONCE DAILY IN PLACE OF LISINOPRIL      Magnesium Oxide (MAGNESIUM-OXIDE) 250 MG TABS tablet Take 1 tablet by mouth daily      traZODone (DESYREL) 50 MG tablet Take 1 tablet by mouth      dronedarone hcl (MULTAQ) 400 MG TABS Take 1 tablet by mouth 2 times daily (with meals) (Patient taking differently: Take 0.5 tablets by mouth 2 times daily (with meals)) 180 tablet 0    insulin glargine, 1 unit dial, (TOUJEO) 300 UNIT/ML concentrated injection pen Inject 25 Units into the skin daily 5 mL 0    gabapentin (NEURONTIN) 100 MG capsule 1-3 PO BID for sciatica      fluticasone (FLONASE) 50 MCG/ACT nasal spray 2 sprays by Nasal route daily      potassium chloride (KLOR-CON M) 20 MEQ extended release tablet Take 1 tablet by mouth daily 30 tablet 5    metoprolol succinate (TOPROL XL) 50 MG extended release tablet Take 1 tablet by mouth daily 90 tablet 3    clopidogrel (PLAVIX) 75 MG tablet Take 1 tablet by mouth daily 90 tablet 3    nitroGLYCERIN (NITROSTAT) 0.4 MG SL tablet Place 1 tablet under the tongue every 5 minutes as needed for Chest pain 25 tablet 11    melatonin 1 MG tablet Take 10 tablets by mouth nightly as needed for Sleep      acetaminophen

## 2025-06-04 ENCOUNTER — OFFICE VISIT (OUTPATIENT)
Age: 80
End: 2025-06-04
Payer: MEDICARE

## 2025-06-04 VITALS
HEIGHT: 61 IN | SYSTOLIC BLOOD PRESSURE: 132 MMHG | DIASTOLIC BLOOD PRESSURE: 60 MMHG | BODY MASS INDEX: 23.37 KG/M2 | WEIGHT: 123.8 LBS | HEART RATE: 62 BPM

## 2025-06-04 DIAGNOSIS — I47.10 PSVT (PAROXYSMAL SUPRAVENTRICULAR TACHYCARDIA): Primary | ICD-10-CM

## 2025-06-04 PROCEDURE — 1090F PRES/ABSN URINE INCON ASSESS: CPT | Performed by: INTERNAL MEDICINE

## 2025-06-04 PROCEDURE — 93000 ELECTROCARDIOGRAM COMPLETE: CPT | Performed by: INTERNAL MEDICINE

## 2025-06-04 PROCEDURE — 1036F TOBACCO NON-USER: CPT | Performed by: INTERNAL MEDICINE

## 2025-06-04 PROCEDURE — 1123F ACP DISCUSS/DSCN MKR DOCD: CPT | Performed by: INTERNAL MEDICINE

## 2025-06-04 PROCEDURE — G8420 CALC BMI NORM PARAMETERS: HCPCS | Performed by: INTERNAL MEDICINE

## 2025-06-04 PROCEDURE — G8428 CUR MEDS NOT DOCUMENT: HCPCS | Performed by: INTERNAL MEDICINE

## 2025-06-04 PROCEDURE — 3078F DIAST BP <80 MM HG: CPT | Performed by: INTERNAL MEDICINE

## 2025-06-04 PROCEDURE — 1125F AMNT PAIN NOTED PAIN PRSNT: CPT | Performed by: INTERNAL MEDICINE

## 2025-06-04 PROCEDURE — 3075F SYST BP GE 130 - 139MM HG: CPT | Performed by: INTERNAL MEDICINE

## 2025-06-04 PROCEDURE — 99214 OFFICE O/P EST MOD 30 MIN: CPT | Performed by: INTERNAL MEDICINE

## 2025-06-04 PROCEDURE — G8399 PT W/DXA RESULTS DOCUMENT: HCPCS | Performed by: INTERNAL MEDICINE

## 2025-06-04 NOTE — PROGRESS NOTES
[facility] CARDIOLOGY, [address], [address]  [place] [address]  PHONE: [phone]  1945    SUBJECTIVE:   Marce Noe is a 79 y.o. female seen for a follow up visit regarding the following:     Chief Complaint   Patient presents with    PSVT (paroxysmal supraventricular tachycardia)    Follow-up       HPI:    Marce Noe is a very pleasant 78 y.o. female with hx of Anxiety, CAD s/p CABG, COPD, SIMI on CPAP, DM2, UTI's, GERD, HH, HLD, HTN, hypothyroidism and heart murmur admitted directly from office with SVT recently for SVT, started on multaq. She presents for office follow up.  She is feeling well, no syncope, minimal palpitations.     Cardiac PMH: (Old records have been reviewed and summarized below)  TTE (9/17/24): EF 55-60%    Reviewed office note Dr. Glez 5/9/25    Past Medical History, Past Surgical History, Family history, Social History, and Medications were all reviewed with the patient today and updated as necessary.     Current Outpatient Medications   Medication Sig Dispense Refill    albuterol sulfate HFA (VENTOLIN HFA) 108 (90 Base) MCG/ACT inhaler Inhale 2 puffs into the lungs every 6 hours as needed for Wheezing      amLODIPine (NORVASC) 2.5 MG tablet Take 1 tablet by mouth daily      valsartan (DIOVAN) 40 MG tablet TAKE 1 TABLET BY MOUTH ONCE DAILY IN PLACE OF LISINOPRIL      Magnesium Oxide (MAGNESIUM-OXIDE) 250 MG TABS tablet Take 1 tablet by mouth daily      traZODone (DESYREL) 50 MG tablet Take 1 tablet by mouth      dronedarone hcl (MULTAQ) 400 MG TABS Take 1 tablet by mouth 2 times daily (with meals) 180 tablet 0    insulin glargine, 1 unit dial, (TOUJEO) 300 UNIT/ML concentrated injection pen Inject 25 Units into the skin daily 5 mL 0    gabapentin (NEURONTIN) 100 MG capsule 1-3 PO BID for sciatica      fluticasone (FLONASE) 50 MCG/ACT nasal spray 2 sprays by Nasal route daily      potassium chloride (KLOR-CON M) 20 MEQ extended release tablet Take

## 2025-06-19 RX ORDER — METOPROLOL SUCCINATE 50 MG/1
50 TABLET, EXTENDED RELEASE ORAL DAILY
Qty: 90 TABLET | Refills: 0 | Status: SHIPPED | OUTPATIENT
Start: 2025-06-19

## 2025-06-19 NOTE — TELEPHONE ENCOUNTER
Requested Prescriptions     Pending Prescriptions Disp Refills    metoprolol succinate (TOPROL XL) 50 MG extended release tablet [Pharmacy Med Name: Metoprolol Succinate ER 50 MG Oral Tablet Extended Release 24 Hour] 90 tablet 0     Sig: Take 1 tablet by mouth once daily     Rx verified last ov 6/4/25

## 2025-08-28 ENCOUNTER — OFFICE VISIT (OUTPATIENT)
Age: 80
End: 2025-08-28
Payer: MEDICARE

## 2025-08-28 VITALS
HEIGHT: 61 IN | SYSTOLIC BLOOD PRESSURE: 134 MMHG | BODY MASS INDEX: 23.98 KG/M2 | DIASTOLIC BLOOD PRESSURE: 76 MMHG | WEIGHT: 127 LBS | HEART RATE: 52 BPM

## 2025-08-28 DIAGNOSIS — I05.1 RHEUMATIC MITRAL REGURGITATION: ICD-10-CM

## 2025-08-28 DIAGNOSIS — I10 ESSENTIAL HYPERTENSION: ICD-10-CM

## 2025-08-28 DIAGNOSIS — I73.9 PERIPHERAL ARTERY DISEASE: ICD-10-CM

## 2025-08-28 DIAGNOSIS — Z95.1 S/P CABG X 3: Chronic | ICD-10-CM

## 2025-08-28 DIAGNOSIS — I47.10 PSVT (PAROXYSMAL SUPRAVENTRICULAR TACHYCARDIA): ICD-10-CM

## 2025-08-28 DIAGNOSIS — I25.118 CORONARY ARTERY DISEASE OF NATIVE ARTERY OF NATIVE HEART WITH STABLE ANGINA PECTORIS: Primary | Chronic | ICD-10-CM

## 2025-08-28 DIAGNOSIS — I49.5 SINUS NODE DYSFUNCTION (HCC): ICD-10-CM

## 2025-08-28 DIAGNOSIS — Z01.818 PRE-OP EVALUATION: ICD-10-CM

## 2025-08-28 PROCEDURE — 3075F SYST BP GE 130 - 139MM HG: CPT | Performed by: INTERNAL MEDICINE

## 2025-08-28 PROCEDURE — 1090F PRES/ABSN URINE INCON ASSESS: CPT | Performed by: INTERNAL MEDICINE

## 2025-08-28 PROCEDURE — 1159F MED LIST DOCD IN RCRD: CPT | Performed by: INTERNAL MEDICINE

## 2025-08-28 PROCEDURE — 3078F DIAST BP <80 MM HG: CPT | Performed by: INTERNAL MEDICINE

## 2025-08-28 PROCEDURE — G8420 CALC BMI NORM PARAMETERS: HCPCS | Performed by: INTERNAL MEDICINE

## 2025-08-28 PROCEDURE — 1123F ACP DISCUSS/DSCN MKR DOCD: CPT | Performed by: INTERNAL MEDICINE

## 2025-08-28 PROCEDURE — 1036F TOBACCO NON-USER: CPT | Performed by: INTERNAL MEDICINE

## 2025-08-28 PROCEDURE — 1160F RVW MEDS BY RX/DR IN RCRD: CPT | Performed by: INTERNAL MEDICINE

## 2025-08-28 PROCEDURE — G8399 PT W/DXA RESULTS DOCUMENT: HCPCS | Performed by: INTERNAL MEDICINE

## 2025-08-28 PROCEDURE — G8427 DOCREV CUR MEDS BY ELIG CLIN: HCPCS | Performed by: INTERNAL MEDICINE

## 2025-08-28 PROCEDURE — 99214 OFFICE O/P EST MOD 30 MIN: CPT | Performed by: INTERNAL MEDICINE

## 2025-08-28 PROCEDURE — 1125F AMNT PAIN NOTED PAIN PRSNT: CPT | Performed by: INTERNAL MEDICINE

## 2025-08-28 RX ORDER — POTASSIUM CHLORIDE 1500 MG/1
20 TABLET, EXTENDED RELEASE ORAL DAILY
Qty: 90 TABLET | Refills: 3 | Status: SHIPPED | OUTPATIENT
Start: 2025-08-28

## 2025-08-28 RX ORDER — NITROGLYCERIN 0.4 MG/1
0.4 TABLET SUBLINGUAL EVERY 5 MIN PRN
Qty: 25 TABLET | Refills: 11 | Status: SHIPPED | OUTPATIENT
Start: 2025-08-28

## 2025-08-28 RX ORDER — CLOPIDOGREL BISULFATE 75 MG/1
75 TABLET ORAL DAILY
Qty: 90 TABLET | Refills: 3 | Status: SHIPPED | OUTPATIENT
Start: 2025-08-28

## 2025-08-28 RX ORDER — METOPROLOL SUCCINATE 50 MG/1
50 TABLET, EXTENDED RELEASE ORAL DAILY
Qty: 90 TABLET | Refills: 3 | Status: SHIPPED | OUTPATIENT
Start: 2025-08-28

## 2025-08-28 ASSESSMENT — ENCOUNTER SYMPTOMS: BACK PAIN: 1

## (undated) DEVICE — SUTURE NONABSORBABLE L24IN SZ 7-0 M0-5 BV175-8 EP 24 BLU M8745

## (undated) DEVICE — SYS INCISION MANAGEMENT -- PREVENA

## (undated) DEVICE — 48" PROBE COVER W/GEL, ULTRASOUND, STERILE: Brand: SITE-RITE

## (undated) DEVICE — CATH URETH INTMIT ROB 14FR FUN -- USE ITEM 179521

## (undated) DEVICE — SUTURE TEMP PACE WIRE SZ 2-0 L24IN NONABSORBABLE LIGHT/DARK

## (undated) DEVICE — BASIC SINGLE BASIN-LF: Brand: MEDLINE INDUSTRIES, INC.

## (undated) DEVICE — CATHETER THOR 24FR L22IN PVC 5 EYELET STR ATRAUM

## (undated) DEVICE — SURGIFOAM SPNG SZ 100

## (undated) DEVICE — APPLIER CLP L9.38IN M LIG TI DISP STR RNG HNDL LIGACLP

## (undated) DEVICE — 1/4 FORCE SURGICAL SPRING CLIP: Brand: STEALTH® SPRING CLIP

## (undated) DEVICE — CLAMP INSERT: Brand: STEALTH® CLAMP INSERT

## (undated) DEVICE — (D)STRIP SKN CLSR 0.5X4IN WHT --

## (undated) DEVICE — SUTURE PDS II SZ 1 L36IN ABSRB VLT L48MM CTX 1/2 CIR Z371T

## (undated) DEVICE — CANNULA PERF L1.8MM TIP L1MM S STL SHFT BLB SHP TIP FEM

## (undated) DEVICE — BLADE SAW W10XL54MM FOR PRI REPEAT STRNOTMY

## (undated) DEVICE — GLOVE SURG SZ 7 L11.2IN THK9.8MIL STRW LTX POLYMER BEAD CUF

## (undated) DEVICE — AMD ANTIMICROBIAL NON-ADHERENT PAD,0.2% POLYHEXAMETHYLENE BIGUANIDE HCI (PHMB): Brand: TELFA

## (undated) DEVICE — Device

## (undated) DEVICE — SUTURE VCRL SZ 3-0 L36IN ABSRB UD L36MM CT-1 1/2 CIR J944H

## (undated) DEVICE — CATHETER ETER TY TEMP SENS F MBO W URIN M FOLLOWS CDC GUIDELINES TO

## (undated) DEVICE — SUTURE S STL SZ 6 L18IN NONABSORBABLE SIL L48MM CCS 1/2 CIR M654G

## (undated) DEVICE — SUTURE VCRL SZ 4-0 L27IN ABSRB UD L19MM PS-2 3/8 CIR PRIM J426H

## (undated) DEVICE — SUT PROL 4-0 30IN SH1 DA BLU --

## (undated) DEVICE — BUTTON SWITCH PENCIL BLADE ELECTRODE, HOLSTER: Brand: EDGE

## (undated) DEVICE — 3000CC GUARDIAN II: Brand: GUARDIAN

## (undated) DEVICE — SUTURE PERMAHAND SZ 2-0 L12X18IN NONABSORBABLE BLK SILK A185H

## (undated) DEVICE — AMD ANTIMICROBIAL BANDAGE ROLL,6 PLY: Brand: KERLIX

## (undated) DEVICE — AMD ANTIMICROBIAL GAUZE SPONGES,12 PLY USP TYPE VII, 0.2% POLYHEXAMETHYLENE BIGUANIDE HCI (PHMB): Brand: CURITY

## (undated) DEVICE — SOLUTION IRRIG 3000ML 0.9% SOD CHL FLX CONT 0797208] ICU MEDICAL INC]

## (undated) DEVICE — REM POLYHESIVE ADULT PATIENT RETURN ELECTRODE: Brand: VALLEYLAB

## (undated) DEVICE — SOLUTION IV 1000ML 0.9% SOD CHL

## (undated) DEVICE — CANNULA INJ L2.5IN BLNT TIP 3MM CLR BODY W/ 1 W VLV DLP

## (undated) DEVICE — SUTURE PROL DBL ARMED 8 0 BV130 5 24IN N ABSRB MFIL BLU M8739

## (undated) DEVICE — STERILE HOOK LOCK LATEX FREE ELASTIC BANDAGE 4INX5YD: Brand: HOOK LOCK™

## (undated) DEVICE — 6 FOOT DISPOSABLE EXTENSION CABLE WITH SAFE CONNECT / SCREW-DOWN

## (undated) DEVICE — Device: Brand: JELCO

## (undated) DEVICE — CLIP INT SM TI EZ LD LIG SYS WECK HORZ

## (undated) DEVICE — CONNECTOR IV 3/8X3/8X3/8 Y

## (undated) DEVICE — BLADE OPHTH MINI BEAV SHRP --

## (undated) DEVICE — AORTIC PUNCHES ARE USED TO CREATE A UNIFORM OPENING IN BLOOD VESSELS DURING CARDIOVASCULAR SURGERY. THE VESSEL GRAFT IS INSERTED INTO THE CREATED OPENING AND SUTURED TO THE VESSEL WALL. AORTIC LANCETS ARE USED TO MAKE A SMALL UNIFORM CUT IN A BLOOD VESSEL TO FACILITATE INSERTION OF AN AORTIC PUNCH.  PUNCHES COME IN VARIOUS LENGTHS, DIAMETERS AND TIP CONFIGURATIONS.: Brand: CLEANCUT ROTATING AORTIC PUNCH

## (undated) DEVICE — CATHETER THOR 32FR L23IN PVC 6 EYELET STR ATRAUM

## (undated) DEVICE — SUTURE ETHBND EXCEL 2-0 L30IN NONABSORBABLE GRN L26MM SH MX563

## (undated) DEVICE — APPLIER CLP LIG SM TI PREM SURGCLP SUPER INTLOK 20 DISP

## (undated) DEVICE — SS SUTURE, 3 PER SLEEVE: Brand: MYO/WIRE II

## (undated) DEVICE — STERILE HOOK LOCK LATEX FREE ELASTIC BANDAGE 6INX5YD: Brand: HOOK LOCK™

## (undated) DEVICE — SUTURE SILK PERMAHAND PRECUT 6 X 30 IN SZ 1 BLK BRAID A307H

## (undated) DEVICE — SUTURE PROL SZ 6-0 L30IN NONABSORBABLE BLU L13MM CC-1 3/8 M8707

## (undated) DEVICE — (D)PREP SKN CHLRAPRP APPL 26ML -- CONVERT TO ITEM 371833

## (undated) DEVICE — SUT PROL 3-0 36IN SH DA BLU --

## (undated) DEVICE — MEDI-TRACE CADENCE ADULT, DEFIBRILLATION ELECTRODE -RTS  (10 PR/PK) - ZOLL: Brand: MEDI-TRACE CADENCE

## (undated) DEVICE — DRAPE SLUSH DISC W44XL66IN ST FOR RND BSIN HUSH SLUSH SYS

## (undated) DEVICE — PLEDGET VASC W3/16XL3/8IN THK1/16IN PTFE SFT

## (undated) DEVICE — SUTURE ETHBND EXCEL SZ 0 L18IN NONABSORBABLE GRN L36MM CT-1 CX21D